# Patient Record
Sex: FEMALE | Race: WHITE | NOT HISPANIC OR LATINO | Employment: FULL TIME | URBAN - METROPOLITAN AREA
[De-identification: names, ages, dates, MRNs, and addresses within clinical notes are randomized per-mention and may not be internally consistent; named-entity substitution may affect disease eponyms.]

---

## 2018-04-12 ENCOUNTER — OFFICE VISIT (OUTPATIENT)
Dept: FAMILY MEDICINE CLINIC | Facility: CLINIC | Age: 58
End: 2018-04-12
Payer: COMMERCIAL

## 2018-04-12 VITALS
RESPIRATION RATE: 16 BRPM | OXYGEN SATURATION: 98 % | SYSTOLIC BLOOD PRESSURE: 126 MMHG | WEIGHT: 247 LBS | HEIGHT: 64 IN | HEART RATE: 74 BPM | BODY MASS INDEX: 42.17 KG/M2 | DIASTOLIC BLOOD PRESSURE: 78 MMHG

## 2018-04-12 DIAGNOSIS — Z23 NEED FOR TETANUS BOOSTER: Primary | ICD-10-CM

## 2018-04-12 DIAGNOSIS — E66.01 CLASS 3 SEVERE OBESITY DUE TO EXCESS CALORIES WITHOUT SERIOUS COMORBIDITY WITH BODY MASS INDEX (BMI) OF 40.0 TO 44.9 IN ADULT (HCC): ICD-10-CM

## 2018-04-12 DIAGNOSIS — I10 ESSENTIAL HYPERTENSION: ICD-10-CM

## 2018-04-12 DIAGNOSIS — R09.82 POST-NASAL DRIP: ICD-10-CM

## 2018-04-12 DIAGNOSIS — R73.03 PREDIABETES: ICD-10-CM

## 2018-04-12 DIAGNOSIS — K74.69 OTHER CIRRHOSIS OF LIVER (HCC): ICD-10-CM

## 2018-04-12 DIAGNOSIS — Z13.1 SCREENING FOR DIABETES MELLITUS (DM): ICD-10-CM

## 2018-04-12 DIAGNOSIS — E78.2 MIXED HYPERLIPIDEMIA: ICD-10-CM

## 2018-04-12 DIAGNOSIS — Z72.0 TOBACCO USE: ICD-10-CM

## 2018-04-12 DIAGNOSIS — Z13.6 SCREENING FOR CARDIOVASCULAR CONDITION: ICD-10-CM

## 2018-04-12 PROBLEM — E66.813 CLASS 3 SEVERE OBESITY DUE TO EXCESS CALORIES WITHOUT SERIOUS COMORBIDITY WITH BODY MASS INDEX (BMI) OF 40.0 TO 44.9 IN ADULT (HCC): Status: ACTIVE | Noted: 2018-04-12

## 2018-04-12 PROCEDURE — 99204 OFFICE O/P NEW MOD 45 MIN: CPT | Performed by: PHYSICIAN ASSISTANT

## 2018-04-12 PROCEDURE — 90471 IMMUNIZATION ADMIN: CPT

## 2018-04-12 PROCEDURE — 90714 TD VACC NO PRESV 7 YRS+ IM: CPT

## 2018-04-12 RX ORDER — ATORVASTATIN CALCIUM 10 MG/1
10 TABLET, FILM COATED ORAL DAILY
Refills: 3 | COMMUNITY
Start: 2018-03-27 | End: 2018-05-18

## 2018-04-12 RX ORDER — ALPRAZOLAM 0.25 MG/1
TABLET ORAL
COMMUNITY
End: 2018-05-18

## 2018-04-12 RX ORDER — LISINOPRIL AND HYDROCHLOROTHIAZIDE 12.5; 1 MG/1; MG/1
1 TABLET ORAL DAILY
Refills: 3 | COMMUNITY
Start: 2018-03-27 | End: 2018-05-18

## 2018-04-12 RX ORDER — FLUTICASONE PROPIONATE 50 MCG
SPRAY, SUSPENSION (ML) NASAL
Refills: 0 | COMMUNITY
Start: 2018-02-09 | End: 2019-06-11

## 2018-04-12 NOTE — ASSESSMENT & PLAN NOTE
- Discussed importance of diet for management of weight  Encouraged both aerobic and resistance exercise 150 minutes a week    - Pt is active with [employer]  - Pt will fill out record release for prior PCP  - CBC, CMP, A1C, TSH, Lipid panel  - Return in 4 weeks to discuss labs and review prior records

## 2018-04-12 NOTE — ASSESSMENT & PLAN NOTE
- Continue Lisinopril-HCTZ QD  - Discussed elevated blood pressure readings and need for management  - Discussed need to go to ED if chest pain, SOB, unremitting headache, vision changes, or decreased urinary output

## 2018-04-12 NOTE — PROGRESS NOTES
Assessment/Plan:    Other cirrhosis of liver (HCC)  Continues to be observed and managed by Hepatologist      Class 3 severe obesity due to excess calories without serious comorbidity with body mass index (BMI) of 40 0 to 44 9 in adult [facility])  - Discussed importance of diet for management of weight  Encouraged both aerobic and resistance exercise 150 minutes a week  - Pt is active with [employer]  - Pt will fill out record release for prior PCP  - CBC, CMP, A1C, TSH, Lipid panel  - Return in 4 weeks to discuss labs and review prior records      Tobacco use  Pt is in the Contemplation stage (ie, Planning on quitting, just not yet)  - Educate on the negative effects of Tobaco   - Recommend quitting smoking  - Listed cessation options   - Will discuss at FU      Need for tetanus booster  - Pt received tetanus booster today    Prediabetes  Per Pt Last A1C of 5 8 two months prior  - Continue metformin  - recheck A1C    Essential hypertension  - Continue Lisinopril-HCTZ QD  - Discussed elevated blood pressure readings and need for management  - Discussed need to go to ED if chest pain, SOB, unremitting headache, vision changes, or decreased urinary output  Mixed hyperlipidemia  - Continue Lipitor  - ordered lipid panel    Post-nasal drip  - Recommended Flonase  - Directed Pt to use OTC allergy medication such as Zyrtec PRN sneezing       Diagnoses and all orders for this visit:    Need for tetanus booster  -     TD VACCINE GREATER THAN OR EQUAL TO 6YO PRESERVATIVE FREE IM    Prediabetes  -     CBC and Platelet; Future  -     Comprehensive metabolic panel; Future  -     Lipid panel; Future  -     TSH, 3rd generation with T4 reflex; Future  -     HEMOGLOBIN A1C W/ EAG ESTIMATION; Future    Class 3 severe obesity due to excess calories without serious comorbidity with body mass index (BMI) of 40 0 to 44 9 in adult (HCC)  -     CBC and Platelet; Future  -     Comprehensive metabolic panel;  Future  -     Lipid panel; Future  -     TSH, 3rd generation with T4 reflex; Future  -     HEMOGLOBIN A1C W/ EAG ESTIMATION; Future    Other cirrhosis of liver (HCC)  -     CBC and Platelet; Future  -     Comprehensive metabolic panel; Future  -     Lipid panel; Future  -     TSH, 3rd generation with T4 reflex; Future  -     HEMOGLOBIN A1C W/ EAG ESTIMATION; Future    Tobacco use  -     CBC and Platelet; Future  -     Comprehensive metabolic panel; Future  -     Lipid panel; Future  -     TSH, 3rd generation with T4 reflex; Future  -     HEMOGLOBIN A1C W/ EAG ESTIMATION; Future    Screening for cardiovascular condition  -     CBC and Platelet; Future  -     Comprehensive metabolic panel; Future  -     Lipid panel; Future  -     TSH, 3rd generation with T4 reflex; Future  -     HEMOGLOBIN A1C W/ EAG ESTIMATION; Future    Screening for diabetes mellitus (DM)  -     CBC and Platelet; Future  -     Comprehensive metabolic panel; Future  -     Lipid panel; Future  -     TSH, 3rd generation with T4 reflex; Future  -     HEMOGLOBIN A1C W/ EAG ESTIMATION; Future    Post-nasal drip    Mixed hyperlipidemia    Essential hypertension    Other orders  -     ALPRAZolam (XANAX) 0 25 mg tablet; Take by mouth daily at bedtime as needed for anxiety          Subjective:      Patient ID: Isabel Schlatter is a 59 y o  female, here for an annual wellness visit  Lingering cough from a cold 2 weeks ago  She may have post nasal drip  She has not been taking any medications the last week  She is in PT for Right achilles tendon  She had surgery at [facility] in 2015 and 2017  She take Metformin only once day  Her last A1C two months prior was 5 8  She does have anxiety several times a year and manages it with Xanax  This is usually surrounding stressful times in her life  None recently  She has been seeing a hepatologist who has been observing her lift function   She was diagnosed with cirrhosis but is uncertain as to the cause       Prior PCP: Dr Montana Anderson, change with insurance  Previous Dental Visit: 3 month prior, awaiting 2 caps  Previous Eye Exam: 1 year prior, no vision problems    Prior Vaccines:   - Last Tetanus vaccine: uncertain, possibly within >10 years ago  Last Colonoscopy: last year, WNL  GYN,     Diet: on weight watcher  Activity: lightly active, no set exercise    Last Pap: Last May, WNL  Last Mammo: Last May, WNL  The following portions of the patient's history were reviewed and updated as appropriate: allergies, current medications, past family history, past medical history, past social history, past surgical history and problem list     Review of Systems   Constitutional: Negative for activity change, chills, fatigue, fever and unexpected weight change  HENT: Negative for rhinorrhea, sinus pressure and sore throat  Eyes: Negative for visual disturbance  Respiratory: Positive for cough and wheezing  Negative for shortness of breath  Cardiovascular: Negative for chest pain, palpitations and leg swelling  Gastrointestinal: Negative for abdominal pain, constipation, diarrhea and nausea  Endocrine: Negative for cold intolerance and heat intolerance  Genitourinary: Negative for difficulty urinating  Musculoskeletal: Negative for arthralgias and myalgias  Skin: Negative for pallor, rash and wound  Allergic/Immunologic: Positive for environmental allergies  Negative for food allergies  Neurological: Negative for dizziness, seizures, speech difficulty, weakness and headaches  Hematological: Negative for adenopathy  Psychiatric/Behavioral: Positive for sleep disturbance  Negative for behavioral problems and dysphoric mood  The patient is nervous/anxious (related to stressors, several times a year)  Social History     Social History    Marital status: /Civil Union     Spouse name: N/A    Number of children: N/A    Years of education: N/A     Occupational History    Not on file  Social History Main Topics    Smoking status: Current Every Day Smoker     Packs/day: 0 50     Years: 40 00    Smokeless tobacco: Never Used    Alcohol use No      Comment: Sober for 20 years    Drug use: No    Sexual activity: No     Other Topics Concern    Not on file     Social History Narrative    Work - [employer]         Objective:  /78 (BP Location: Left arm, Patient Position: Sitting, Cuff Size: Large)   Pulse 74   Resp 16   Ht 5' 4" (1 626 m)   Wt 112 kg (247 lb)   SpO2 98%   BMI 42 40 kg/m²      Physical Exam   Constitutional: She is oriented to person, place, and time  She appears well-developed and well-nourished  No distress  HENT:   Head: Normocephalic and atraumatic  Right Ear: External ear normal    Left Ear: External ear normal    Mouth/Throat: Oropharynx is clear and moist  No oropharyngeal exudate  Eyes: Pupils are equal, round, and reactive to light  Neck: Normal range of motion  Neck supple  No thyromegaly present  Cardiovascular: Normal rate, regular rhythm, normal heart sounds and intact distal pulses  Exam reveals no gallop and no friction rub  No murmur heard  Pulmonary/Chest: Effort normal and breath sounds normal  No respiratory distress  She has no wheezes  She has no rales  Abdominal: Soft  Bowel sounds are normal  She exhibits no distension  There is hepatomegaly  There is no tenderness  There is no rebound and no guarding  Musculoskeletal: Normal range of motion  She exhibits no deformity  Right lower leg: She exhibits edema (trace)  Left lower leg: She exhibits edema (1+)  Lymphadenopathy:     She has no cervical adenopathy  Neurological: She is alert and oriented to person, place, and time  Skin: Skin is warm and dry  No rash noted  She is not diaphoretic  No erythema  Psychiatric: She has a normal mood and affect

## 2018-04-12 NOTE — ASSESSMENT & PLAN NOTE
Pt is in the Contemplation stage (ie, Planning on quitting, just not yet)  - Educate on the negative effects of Tobaco   - Recommend quitting smoking  - Listed cessation options   - Will discuss at FU

## 2018-05-18 DIAGNOSIS — I10 ESSENTIAL HYPERTENSION: ICD-10-CM

## 2018-05-18 DIAGNOSIS — F41.9 ANXIETY: Primary | ICD-10-CM

## 2018-05-18 DIAGNOSIS — E11.9 TYPE 2 DIABETES MELLITUS WITHOUT COMPLICATION, WITHOUT LONG-TERM CURRENT USE OF INSULIN (HCC): ICD-10-CM

## 2018-05-18 DIAGNOSIS — E78.2 MIXED HYPERLIPIDEMIA: ICD-10-CM

## 2018-05-18 RX ORDER — ATORVASTATIN CALCIUM 10 MG/1
10 TABLET, FILM COATED ORAL DAILY
Qty: 90 TABLET | Refills: 0 | Status: SHIPPED | OUTPATIENT
Start: 2018-05-18 | End: 2018-07-12 | Stop reason: SDUPTHER

## 2018-05-18 RX ORDER — LISINOPRIL AND HYDROCHLOROTHIAZIDE 12.5; 1 MG/1; MG/1
1 TABLET ORAL DAILY
Qty: 90 TABLET | Refills: 0 | Status: SHIPPED | OUTPATIENT
Start: 2018-05-18 | End: 2019-07-22 | Stop reason: SDUPTHER

## 2018-05-18 RX ORDER — ALPRAZOLAM 0.25 MG/1
TABLET ORAL
Qty: 30 TABLET | Refills: 0 | OUTPATIENT
Start: 2018-05-18

## 2018-05-18 NOTE — TELEPHONE ENCOUNTER
Refilled Lipitor, Metformin and Lisinopril-HCTZ  She will need to be seen to refill Xanax as it was addressed at the previous appointments  Checked [place] and [place]MP; no red flags identified; safe to proceed with prescriptions     Xanax last filled on 7/31/2017, 90 tablets by Dr Sidney Bey

## 2018-05-21 NOTE — TELEPHONE ENCOUNTER
Pt does have a follow up appt in July, and was just here last month    Did you want her to come in sooner?

## 2018-05-21 NOTE — TELEPHONE ENCOUNTER
It would make more sense if I finished my sentence from the prior note  Her Anxiety and Xanax were not discussed in the prior appointment  It will be covered at her appointment in July but if she needs some Xanax until that point we would need to speak with her

## 2018-05-21 NOTE — TELEPHONE ENCOUNTER
I will advise the patient, however the note from last month does state "She does have anxiety several times a year and manages it with Xanax   This is usually surrounding stressful times in her life "

## 2018-07-02 ENCOUNTER — APPOINTMENT (OUTPATIENT)
Dept: LAB | Facility: CLINIC | Age: 58
End: 2018-07-02
Payer: COMMERCIAL

## 2018-07-02 DIAGNOSIS — K74.69 OTHER CIRRHOSIS OF LIVER (HCC): ICD-10-CM

## 2018-07-02 DIAGNOSIS — E66.01 CLASS 3 SEVERE OBESITY DUE TO EXCESS CALORIES WITHOUT SERIOUS COMORBIDITY WITH BODY MASS INDEX (BMI) OF 40.0 TO 44.9 IN ADULT (HCC): ICD-10-CM

## 2018-07-02 DIAGNOSIS — Z13.1 SCREENING FOR DIABETES MELLITUS (DM): ICD-10-CM

## 2018-07-02 DIAGNOSIS — Z13.6 SCREENING FOR CARDIOVASCULAR CONDITION: ICD-10-CM

## 2018-07-02 DIAGNOSIS — R73.03 PREDIABETES: ICD-10-CM

## 2018-07-02 DIAGNOSIS — Z72.0 TOBACCO USE: ICD-10-CM

## 2018-07-02 LAB
ALBUMIN SERPL BCP-MCNC: 3.8 G/DL (ref 3.5–5)
ALP SERPL-CCNC: 87 U/L (ref 46–116)
ALT SERPL W P-5'-P-CCNC: 28 U/L (ref 12–78)
ANION GAP SERPL CALCULATED.3IONS-SCNC: 7 MMOL/L (ref 4–13)
AST SERPL W P-5'-P-CCNC: 22 U/L (ref 5–45)
BILIRUB SERPL-MCNC: 0.79 MG/DL (ref 0.2–1)
BUN SERPL-MCNC: 13 MG/DL (ref 5–25)
CALCIUM SERPL-MCNC: 9.3 MG/DL (ref 8.3–10.1)
CHLORIDE SERPL-SCNC: 103 MMOL/L (ref 100–108)
CHOLEST SERPL-MCNC: 165 MG/DL (ref 50–200)
CO2 SERPL-SCNC: 27 MMOL/L (ref 21–32)
CREAT SERPL-MCNC: 0.69 MG/DL (ref 0.6–1.3)
ERYTHROCYTE [DISTWIDTH] IN BLOOD BY AUTOMATED COUNT: 12.6 % (ref 11.6–15.1)
EST. AVERAGE GLUCOSE BLD GHB EST-MCNC: 131 MG/DL
GFR SERPL CREATININE-BSD FRML MDRD: 96 ML/MIN/1.73SQ M
GLUCOSE P FAST SERPL-MCNC: 132 MG/DL (ref 65–99)
HBA1C MFR BLD: 6.2 % (ref 4.2–6.3)
HCT VFR BLD AUTO: 46.6 % (ref 34.8–46.1)
HDLC SERPL-MCNC: 50 MG/DL (ref 40–60)
HGB BLD-MCNC: 15.2 G/DL (ref 11.5–15.4)
LDLC SERPL CALC-MCNC: 96 MG/DL (ref 0–100)
MCH RBC QN AUTO: 31.3 PG (ref 26.8–34.3)
MCHC RBC AUTO-ENTMCNC: 32.6 G/DL (ref 31.4–37.4)
MCV RBC AUTO: 96 FL (ref 82–98)
NONHDLC SERPL-MCNC: 115 MG/DL
PLATELET # BLD AUTO: 172 THOUSANDS/UL (ref 149–390)
PMV BLD AUTO: 10.5 FL (ref 8.9–12.7)
POTASSIUM SERPL-SCNC: 4.3 MMOL/L (ref 3.5–5.3)
PROT SERPL-MCNC: 7.4 G/DL (ref 6.4–8.2)
RBC # BLD AUTO: 4.86 MILLION/UL (ref 3.81–5.12)
SODIUM SERPL-SCNC: 137 MMOL/L (ref 136–145)
TRIGL SERPL-MCNC: 93 MG/DL
TSH SERPL DL<=0.05 MIU/L-ACNC: 2.52 UIU/ML (ref 0.36–3.74)
WBC # BLD AUTO: 4.56 THOUSAND/UL (ref 4.31–10.16)

## 2018-07-02 PROCEDURE — 84443 ASSAY THYROID STIM HORMONE: CPT

## 2018-07-02 PROCEDURE — 85027 COMPLETE CBC AUTOMATED: CPT

## 2018-07-02 PROCEDURE — 36415 COLL VENOUS BLD VENIPUNCTURE: CPT

## 2018-07-02 PROCEDURE — 80053 COMPREHEN METABOLIC PANEL: CPT

## 2018-07-02 PROCEDURE — 80061 LIPID PANEL: CPT

## 2018-07-02 PROCEDURE — 83036 HEMOGLOBIN GLYCOSYLATED A1C: CPT

## 2018-07-12 ENCOUNTER — OFFICE VISIT (OUTPATIENT)
Dept: FAMILY MEDICINE CLINIC | Facility: CLINIC | Age: 58
End: 2018-07-12
Payer: COMMERCIAL

## 2018-07-12 VITALS
WEIGHT: 263 LBS | SYSTOLIC BLOOD PRESSURE: 138 MMHG | RESPIRATION RATE: 16 BRPM | HEIGHT: 64 IN | DIASTOLIC BLOOD PRESSURE: 82 MMHG | BODY MASS INDEX: 44.9 KG/M2 | HEART RATE: 83 BPM | OXYGEN SATURATION: 98 %

## 2018-07-12 DIAGNOSIS — R09.82 POST-NASAL DRIP: ICD-10-CM

## 2018-07-12 DIAGNOSIS — Z72.0 TOBACCO USE: ICD-10-CM

## 2018-07-12 DIAGNOSIS — E78.2 MIXED HYPERLIPIDEMIA: ICD-10-CM

## 2018-07-12 DIAGNOSIS — E66.01 CLASS 3 SEVERE OBESITY DUE TO EXCESS CALORIES WITHOUT SERIOUS COMORBIDITY WITH BODY MASS INDEX (BMI) OF 40.0 TO 44.9 IN ADULT (HCC): Primary | ICD-10-CM

## 2018-07-12 DIAGNOSIS — F41.9 ANXIETY: ICD-10-CM

## 2018-07-12 DIAGNOSIS — I10 ESSENTIAL HYPERTENSION: ICD-10-CM

## 2018-07-12 DIAGNOSIS — R73.03 PREDIABETES: ICD-10-CM

## 2018-07-12 PROBLEM — Z23 NEED FOR TETANUS BOOSTER: Status: RESOLVED | Noted: 2018-04-12 | Resolved: 2018-07-12

## 2018-07-12 PROCEDURE — 99214 OFFICE O/P EST MOD 30 MIN: CPT | Performed by: PHYSICIAN ASSISTANT

## 2018-07-12 PROCEDURE — 4004F PT TOBACCO SCREEN RCVD TLK: CPT | Performed by: PHYSICIAN ASSISTANT

## 2018-07-12 RX ORDER — ATORVASTATIN CALCIUM 40 MG/1
40 TABLET, FILM COATED ORAL DAILY
Qty: 90 TABLET | Refills: 0 | Status: CANCELLED | OUTPATIENT
Start: 2018-07-12

## 2018-07-12 RX ORDER — DOXYCYCLINE HYCLATE 100 MG/1
CAPSULE ORAL
COMMUNITY
End: 2018-08-14 | Stop reason: ALTCHOICE

## 2018-07-12 RX ORDER — BUPROPION HYDROCHLORIDE 150 MG/1
150 TABLET, EXTENDED RELEASE ORAL 2 TIMES DAILY
Qty: 60 TABLET | Refills: 0 | Status: SHIPPED | OUTPATIENT
Start: 2018-07-12 | End: 2018-08-14 | Stop reason: SINTOL

## 2018-07-12 RX ORDER — ATORVASTATIN CALCIUM 20 MG/1
20 TABLET, FILM COATED ORAL DAILY
Qty: 30 TABLET | Refills: 0 | Status: SHIPPED | OUTPATIENT
Start: 2018-07-12 | End: 2018-08-13 | Stop reason: SDUPTHER

## 2018-07-12 RX ORDER — ALPRAZOLAM 0.25 MG/1
0.25 TABLET ORAL
Qty: 30 TABLET | Refills: 0 | Status: SHIPPED | OUTPATIENT
Start: 2018-07-12 | End: 2018-08-14 | Stop reason: SDUPTHER

## 2018-07-12 NOTE — ASSESSMENT & PLAN NOTE
ASCVD 10 year risk of 9 1%   "Medium Intensity statin therapy is reasonable for an adult with a >7 5% estimated 10-year risk and LDL of 70 - 189 mg/dl " ACC 2013 Guideline on the Treatment of Blood Cholesterol    - Continue Atorvastatin 20 mg

## 2018-07-12 NOTE — ASSESSMENT & PLAN NOTE
Moderately well controlled  Checked the [place] and [place]MP; no red flags identified; safe to proceed with prescriptions   Last script was 90 0 25 tablets in 7/2017  - Xanax 0 25mg refilled   - Discussed Wellbutrin possibly improving mood  - Pt will return if she needs a refill

## 2018-07-12 NOTE — ASSESSMENT & PLAN NOTE
138/82, Repeat BP of 140/76  Pt wants to wait to increase dosage until she gets more home measurements  - Continue Lisinopril- HCTZ   - Discussed home BP measurements and brining log and machine at FU   Pt will call with logs next week  - Ordered Micro/Creat ratio  - Discussed eye exam  Pt agreed to schedule eye exam

## 2018-07-12 NOTE — PROGRESS NOTES
Assessment/Plan:    Anxiety  Moderately well controlled  Checked [place] and [place]; no red flags identified; safe to proceed with prescriptions  Last script was 90 0 25 tablets in 7/2017  - Xanax 0 25mg refilled (30 tablets)  - Discussed Wellbutrin possibly improving mood  - Pt will return if she needs a refill    Tobacco use  Pt has in the planning stage of quiting  She will set a quit date and discuss with her family  - Discussed impact of quitting smoking  Pt is motivated to quit  - Start on Wellbutrin 150mg BID for 30 days  Will reassess and provide refill for up to 2 more months  - FU in 1 month    Class 3 severe obesity due to excess calories without serious comorbidity with body mass index (BMI) of 40 0 to 44 9 in adult (HCC)  Weight is increased 16 pounds in 3 months  Discussed need for weight loss  Pt agrees  - Discussed restarting Weight watchers  - Referral to Registered Dietitian    Essential hypertension  138/82, Repeat BP of 140/76  Pt wants to wait to increase dosage until she gets more home measurements  - Continue Lisinopril- HCTZ   - Discussed home BP measurements and brining log and machine at FU  Pt will call with logs next week  - Ordered Micro/Creat ratio  - Discussed eye exam  Pt agreed to schedule eye exam    Mixed hyperlipidemia  ASCVD 10 year risk of 9 1%   "Medium Intensity statin therapy is reasonable for an adult with a >7 5% estimated 10-year risk and LDL of 70 - 189 mg/dl " ACC 2013 Guideline on the Treatment of Blood Cholesterol  - Continue Atorvastatin 20 mg    Prediabetes  A1C of 6 2 (7/2018) increased from 5 8 (4/2018)  - Continue Metformin 500mg QD    Other cirrhosis of liver ([facility] [address] )  Managed by Gastroenterologist  Last exam was in January  She is scheduled for US next month    Post-nasal drip  Continues to bother her  - Recommended Flonase    - Records release signed for for [facility] to review Prior Pap smear/Mammogram/Colonoscopy  - FU appt scheduled in 1 month    I have spent 45 minutes with Patient  today in which greater than 50% of this time was spent in counseling/coordination of care regarding Diagnostic results, Risks and benefits of tx options, Patient and family education and Impressions  Discussed case with Dr Sabrina Lobato    Subjective:    Patient ID: Cathy Ross is a 59 y o  female  Pt is presenting today for FU of labs and to discuss Anxiety and smoking cessation  Pt is requesting refill of Xanax 0 25 mg  She uses Xanax during only stressful times (loss of mother-in-law, long trips)  She usually takes one for several days in a row  She has a home BP cuff but does not use it often  She knows how but forgets to write them down when she does  Pt stopped going to weight watchers and has gained weight  She does not feel motivated to lose weight  She has decreased the amount of cigarettes to 5-6 a day from 10  She still wants to quit smoking but has not set a date  Her last colonoscopy, mammogram and pap smear were all performed at [facility]  The colonoscopy was performed last year with normal findings  Mammogram and Pap smear [facility] 2 months prior with normal findings  She denies any side effects from her medications  The following portions of the patient's history were reviewed and updated as appropriate: allergies, current medications, past medical history, past social history and problem list     Review of Systems   Constitutional: Negative for fatigue, fever and unexpected weight change  HENT: Negative for rhinorrhea and sore throat  Respiratory: Negative for cough, shortness of breath and wheezing  Cardiovascular: Negative for chest pain, palpitations and leg swelling  Gastrointestinal: Negative for constipation, diarrhea and nausea  Musculoskeletal: Negative for arthralgias and myalgias           Objective:  /82 (BP Location: Right arm, Patient Position: Sitting, Cuff Size: Standard)   Pulse 83   Resp 16 Ht 5' 4" (1 626 m)   Wt 119 kg (263 lb)   SpO2 98%   BMI 45 14 kg/m²      Physical Exam   Constitutional: She appears well-developed and well-nourished  No distress  HENT:   Head: Normocephalic and atraumatic  Eyes: Pupils are equal, round, and reactive to light  Neck: Normal range of motion  Neck supple  Cardiovascular: Normal rate, regular rhythm, normal heart sounds and intact distal pulses  Exam reveals no gallop and no friction rub  No murmur heard  Pulmonary/Chest: Effort normal and breath sounds normal  No respiratory distress  She has no wheezes  She has no rales  Lymphadenopathy:     She has no cervical adenopathy  Skin: She is not diaphoretic

## 2018-07-12 NOTE — ASSESSMENT & PLAN NOTE
Pt has in the planning stage   She will set a quit date and discuss with her family   - Start on Wellbutrin 150mg BID for 30 days  - FU in 1 month

## 2018-07-12 NOTE — PROGRESS NOTES
Assessment/Plan:    Mixed hyperlipidemia  ASCVD 10 year risk of 14 2%  "High Intensity statin therapy is reasonable for an adult with diabetes mellitus with a >7 5% estimated  10-year risk " ACC 2013 Guideline on the Treatment of Blood Cholesterol    - Increase dose of Atorvastatin to 40 mg, slowly titrating up  - Repeat lipid in 1 month      Prediabetes  A1C of 6 2 (7/2018) increased from 5 8 (4/2018)  - Continue Metformin    Essential hypertension    Repeat BP of   - Continue Lisinopril- HCTZ   - Discussed home BP measurements and brining log and machine at FU   - Ordered Micro/Creat ratio       Diagnoses and all orders for this visit:    Class 3 severe obesity due to excess calories without serious comorbidity with body mass index (BMI) of 40 0 to 44 9 in [facility])  -     Ambulatory referral to Nutrition Services; Future    Essential hypertension  -     Microalbumin / creatinine urine ratio    Mixed hyperlipidemia  -     atorvastatin (LIPITOR) 40 mg tablet; Take 1 tablet (40 mg total) by mouth daily  -     Lipid panel; Future    Prediabetes    Tobacco use    Other orders  -     doxycycline hyclate (VIBRAMYCIN) 100 mg capsule; doxycycline hyclate 100 mg capsule        Discussed case with Dr Lamb:    Patient ID: Trice Zamudio is a 59 y o  female  Pt is presenting today for     She uses Xanax during stressful times (loss of mother-in-law, long trips)  She usually takes one for several day    Pt stopped going to weight watchers      Last colonoscopy, performed at [facility] and Pap smear [facility] 2 months prior      The following portions of the patient's history were reviewed and updated as appropriate: allergies, current medications, past medical history, past social history and problem list     Review of Systems      Objective:  /82 (BP Location: Right arm, Patient Position: Sitting, Cuff Size: Standard)   Pulse 83   Resp 16   Ht 5' 4" (1 626 m)   Wt 119 kg (263 lb)   SpO2 98% BMI 45 14 kg/m²      Physical Exam

## 2018-08-13 DIAGNOSIS — E78.2 MIXED HYPERLIPIDEMIA: ICD-10-CM

## 2018-08-13 RX ORDER — ATORVASTATIN CALCIUM 20 MG/1
TABLET, FILM COATED ORAL
Qty: 90 TABLET | Refills: 1 | Status: SHIPPED | OUTPATIENT
Start: 2018-08-13 | End: 2018-08-14 | Stop reason: SDUPTHER

## 2018-08-14 ENCOUNTER — OFFICE VISIT (OUTPATIENT)
Dept: FAMILY MEDICINE CLINIC | Facility: CLINIC | Age: 58
End: 2018-08-14
Payer: COMMERCIAL

## 2018-08-14 VITALS
RESPIRATION RATE: 18 BRPM | HEIGHT: 64 IN | BODY MASS INDEX: 45.24 KG/M2 | OXYGEN SATURATION: 98 % | DIASTOLIC BLOOD PRESSURE: 72 MMHG | WEIGHT: 265 LBS | SYSTOLIC BLOOD PRESSURE: 110 MMHG | HEART RATE: 83 BPM

## 2018-08-14 DIAGNOSIS — Z72.0 TOBACCO USE: ICD-10-CM

## 2018-08-14 DIAGNOSIS — I10 ESSENTIAL HYPERTENSION: ICD-10-CM

## 2018-08-14 DIAGNOSIS — E66.01 CLASS 3 SEVERE OBESITY DUE TO EXCESS CALORIES WITHOUT SERIOUS COMORBIDITY WITH BODY MASS INDEX (BMI) OF 40.0 TO 44.9 IN ADULT (HCC): ICD-10-CM

## 2018-08-14 DIAGNOSIS — R73.03 PREDIABETES: ICD-10-CM

## 2018-08-14 DIAGNOSIS — F41.9 ANXIETY: ICD-10-CM

## 2018-08-14 DIAGNOSIS — M54.32 SCIATICA OF LEFT SIDE: ICD-10-CM

## 2018-08-14 DIAGNOSIS — E78.2 MIXED HYPERLIPIDEMIA: Primary | ICD-10-CM

## 2018-08-14 PROCEDURE — 3078F DIAST BP <80 MM HG: CPT | Performed by: PHYSICIAN ASSISTANT

## 2018-08-14 PROCEDURE — 99214 OFFICE O/P EST MOD 30 MIN: CPT | Performed by: PHYSICIAN ASSISTANT

## 2018-08-14 PROCEDURE — 3008F BODY MASS INDEX DOCD: CPT | Performed by: PHYSICIAN ASSISTANT

## 2018-08-14 PROCEDURE — 3074F SYST BP LT 130 MM HG: CPT | Performed by: PHYSICIAN ASSISTANT

## 2018-08-14 RX ORDER — ATORVASTATIN CALCIUM 20 MG/1
20 TABLET, FILM COATED ORAL DAILY
Qty: 90 TABLET | Refills: 1 | Status: SHIPPED | OUTPATIENT
Start: 2018-08-14 | End: 2020-02-10

## 2018-08-14 RX ORDER — ALPRAZOLAM 0.25 MG/1
0.25 TABLET ORAL
Qty: 30 TABLET | Refills: 0 | Status: SHIPPED | OUTPATIENT
Start: 2018-08-14 | End: 2019-02-06 | Stop reason: SDUPTHER

## 2018-08-14 NOTE — ASSESSMENT & PLAN NOTE
Well Managed  1st 110/72, 2nd 114/76   Pt at home BP measurements are <130/80   - Continue Lisinopril HCTZ  - Pt as yet to schedule eye exam

## 2018-08-14 NOTE — ASSESSMENT & PLAN NOTE
Patient has set quit date of Labor day  Pt declined nicotine replacement therapy    - Will FU if any assistance needed

## 2018-08-14 NOTE — ASSESSMENT & PLAN NOTE
Well managed on Xanax 0 25mg  Checked [place] and [place]; no red flags identified; safe to proceed with prescriptions

## 2018-08-14 NOTE — PROGRESS NOTES
Assessment/Plan:    Sciatica of left side  Intermittent problem  Chronic issue  Pt has seeing Chiropractor for management  - Pt will FU if symptoms persist     Class 3 severe obesity due to excess calories without serious comorbidity with body mass index (BMI) of 40 0 to 44 9 in adult (HCC)  Weight is up 2 pounds in 1 month  Pt has appointment setup for Dietitian next month  - FU in 3 months    Essential hypertension  Well Managed  1st 110/72, 2nd 114/76  Pt at home BP measurements are <130/80   - Continue Lisinopril HCTZ  - Pt as yet to schedule eye exam     Anxiety  Well managed on Xanax 0 25mg  [place]; no red flags identified; safe to proceed with prescriptions  Prediabetes  Repeat A1C in 3 months  - Continue Metformin    Tobacco use  Patient has set quit date of Labor day  Pt declined nicotine replacement therapy  - Will FU if any assistance needed    Mixed hyperlipidemia  ASCVD 10 year risk of 5 2% down from 9 1% last month  Once she quits smoking it should lower it to 2 5%  - Continue Lipitor 20mg    FU appointment scheduled for 3 months  Discussed case with Dr Danielle Dial    Subjective:    Patient ID: Mathew Camacho is a 59 y o  female  Pt is presenting today for Chronic condition follow up  She was on Wellbutrin for 10 days and she stopped she was having dizzy episodes  She is still smoking and moved to 4 cigarettes a day  She has set labor day as her quit date  Her family is supportive and she is planning on quitting by continuing her gradual reduction  Her home BP has been with the average 128/85  She denies any light headedness  She has been taking her medications without any side effects  She has an appointment with the RD on Sept 19th to discuss healthy eating options  She does not have a set goal for her weight  She has yearly US of her liver to check for changes with hepatomegaly  Her last was performed last month with no new changes      Her last colonoscopy was last year at [facility]  Records request already submitted  Last Pap: May 2017, [facility]  Last Mammogram: May 2018, [facility]    The following portions of the patient's history were reviewed and updated as appropriate: allergies, current medications, past medical history, past surgical history and problem list     Review of Systems   Constitutional: Negative for activity change, chills, fatigue, fever and unexpected weight change  HENT: Negative for rhinorrhea, sinus pressure and sore throat  Eyes: Negative for visual disturbance  Respiratory: Negative for cough, shortness of breath and wheezing  Cardiovascular: Negative for chest pain, palpitations and leg swelling  Gastrointestinal: Negative for abdominal pain, constipation, diarrhea and nausea  Endocrine: Negative for cold intolerance and heat intolerance  Genitourinary: Negative for difficulty urinating  Musculoskeletal: Positive for back pain (left sided, baseline)  Negative for arthralgias and myalgias  Skin: Negative for pallor, rash and wound  Allergic/Immunologic: Negative for environmental allergies and food allergies  Neurological: Negative for dizziness, seizures, speech difficulty, weakness and headaches  Hematological: Negative for adenopathy  Psychiatric/Behavioral: Negative for behavioral problems, dysphoric mood and sleep disturbance  The patient is not nervous/anxious  Objective:  /72   Pulse 83   Resp 18   Ht 5' 4" (1 626 m)   Wt 120 kg (265 lb)   SpO2 98%   BMI 45 49 kg/m²      Physical Exam   Constitutional: She is oriented to person, place, and time  She appears well-developed and well-nourished  No distress  HENT:   Head: Normocephalic and atraumatic  Right Ear: External ear normal    Left Ear: External ear normal    Mouth/Throat: Oropharynx is clear and moist  No oropharyngeal exudate  Eyes: Pupils are equal, round, and reactive to light  Neck: Normal range of motion   Neck supple  No thyromegaly present  Cardiovascular: Normal rate, regular rhythm, normal heart sounds and intact distal pulses  Exam reveals no gallop and no friction rub  No murmur heard  Pulmonary/Chest: Effort normal and breath sounds normal  No respiratory distress  She has no wheezes  She has no rales  Abdominal: Soft  Bowel sounds are normal  She exhibits no distension  There is hepatomegaly  There is no tenderness  There is no rebound and no guarding  obese     Musculoskeletal: Normal range of motion  She exhibits no edema or deformity  Lymphadenopathy:     She has no cervical adenopathy  Neurological: She is alert and oriented to person, place, and time  Skin: Skin is warm and dry  No rash noted  She is not diaphoretic  No erythema  Psychiatric: She has a normal mood and affect   Her behavior is normal  Thought content normal

## 2018-08-14 NOTE — ASSESSMENT & PLAN NOTE
ASCVD 10 year risk of 5 2% down from 9 1% last month   Once she quits smoking it should lower it to 2 5%  - Continue Lipitor 20mg

## 2018-08-14 NOTE — ASSESSMENT & PLAN NOTE
Weight is up 2 pounds in 1 month    Pt has appointment setup for Dietitian next month  - FU in 3 months

## 2018-09-19 ENCOUNTER — CLINICAL SUPPORT (OUTPATIENT)
Dept: NUTRITION | Facility: HOSPITAL | Age: 58
End: 2018-09-19
Payer: COMMERCIAL

## 2018-09-19 VITALS — BODY MASS INDEX: 45.65 KG/M2 | WEIGHT: 267.4 LBS | HEIGHT: 64 IN

## 2018-09-19 DIAGNOSIS — E66.01 CLASS 3 SEVERE OBESITY DUE TO EXCESS CALORIES WITHOUT SERIOUS COMORBIDITY WITH BODY MASS INDEX (BMI) OF 40.0 TO 44.9 IN ADULT (HCC): ICD-10-CM

## 2018-09-19 PROCEDURE — 97802 MEDICAL NUTRITION INDIV IN: CPT

## 2018-09-19 NOTE — PROGRESS NOTES
Initial Nutrition Assessment Form    Patient Name: Veronica Gottlieb    YOB: 1960    Sex: Female     Assessment Date: 9/19/2018  Start Time: 46 Stop Time: 1010 Total Minutes: 61     Data:  Present at session: self   Parent Concerns: n/a   Medical Dx/Reason for Referral: obesity   No past medical history on file  HTN hypercholesterolemia prediabetes   Current Outpatient Prescriptions   Medication Sig Dispense Refill    ALPRAZolam (XANAX) 0 25 mg tablet Take 1 tablet (0 25 mg total) by mouth daily at bedtime as needed for anxiety for up to 30 doses 30 tablet 0    atorvastatin (LIPITOR) 20 mg tablet Take 1 tablet (20 mg total) by mouth daily 90 tablet 1    fluticasone (FLONASE) 50 mcg/act nasal spray USE ONE SPRAY IN EACH NOSTRIL TWO TIMES A DAY  0    lisinopril-hydrochlorothiazide (PRINZIDE,ZESTORETIC) 10-12 5 MG per tablet Take 1 tablet by mouth daily 90 tablet 0    metFORMIN (GLUCOPHAGE) 500 mg tablet Take 1 tablet (500 mg total) by mouth 2 (two) times a day with meals 180 tablet 1     No current facility-administered medications for this visit  Additional Meds/Supplements: n/a   Special Learning Needs:    Height:   HC Readings from Last 3 Encounters:   No data found for [facility]       Weight: Wt Readings from Last 12 Encounters:   09/19/18 121 kg (267 lb 6 4 oz)   08/14/18 120 kg (265 lb)   07/12/18 119 kg (263 lb)   04/12/18 112 kg (247 lb)     Estimated body mass index is 45 9 kg/m² as calculated from the following:    Height as of this encounter: 5' 4" (1 626 m)  Weight as of this encounter: 121 kg (267 lb 6 4 oz)    Usual Weight: #  Ideal Body Weight: #   Recent Weight Change: []Yes     [x]No  Amount:       Energy Needs: No calculation needed   Allergies   Allergen Reactions    Penicillins     mushrooms   History   Alcohol Use No     Comment: Sober for 20 years    n/a   History   Smoking Status    Current Every Day Smoker    Packs/day: 0 50    Years: 40 00   Smokeless Tobacco    Never Used    2 cigs/day in process of quitting   Who shops? patient   Who cooks? patient   Exercise: walk dog around yard only n/a   Prior Counseling? []Yes     [x]No  When:    Why:         Diet Hx: water  Breakfast:  1x/wk out to eat coffee x1pkt; pork roll egg cheese 2x/wk; always coffee 30 oz   8   a m  2-3x/wk   Lunch:  Soup (cream of eliot, us  Broth soup) salad (sometmes) 1/2 s/w (chix pesto wrap, ham/swiss) fruit (sometimes)  12   p m   water         Dinner:  Mac hamburger tomato soup 2 c; 4 oz chix 1 c rice and veggies 1 c  Eliot/caul/br sprouts/snow peas/corn/aspargus   530-630   p m  club soda; iced tea-sweet or unsweet         Snacks:  Sweets or salty/crunchy  At work AM -   PM - 1-2x/wk   HS - n/a        Nutrition Diagnosis:   Overweight/obesity  related to Excess energy intake as evidenced by  Overconsumption of high-fat and/or calorie dense food or beverage       Medical Nutrition Therapy Intervention:  [x]Individualized Meal Plan []Understanding Lab Values   []Basic Pathophysiology of Disease []Food/Medication Interactions   [x]Food Diary [x]Exercise   [x]Lifestyle/Behavior Modification Techniques []Medication, Mechanism of Action   []Label Reading []Self Blood Glucose Monitoring   [x]Weight/BMI Goals []Other -    Other Notes:bed 830-9;sleep at 10;awake 5-530        Comprehension: []Excellent  [x]Very Good  []Good  []Fair   []Poor    Receptivity: []Excellent  [x]Very Good  []Good  []Fair   []Poor    Expected Compliance: []Excellent  [x]Very Good  []Good  []Fair   []Poor        Goals:  1   3 meals/day no skipping   2  Watch portion sizes of meals and minimize snacks   3  Activity 3x/wk 30 mins goal       No Follow-up on file    Labs:  CMP  Lab Results   Component Value Date     07/02/2018    K 4 3 07/02/2018     07/02/2018    CO2 27 07/02/2018    ANIONGAP 14 (H) 05/08/2014    BUN 13 07/02/2018    CREATININE 0 69 07/02/2018    GLUCOSE 102 05/08/2014    GLUF 132 (H) 07/02/2018    CALCIUM 9 3 07/02/2018    AST 22 07/02/2018    ALT 28 07/02/2018    ALKPHOS 87 07/02/2018    PROT 7 2 05/08/2014    BILITOT 0 7 05/08/2014    EGFR 96 07/02/2018       BMP  Lab Results   Component Value Date    GLUCOSE 102 05/08/2014    CALCIUM 9 3 07/02/2018     07/02/2018    K 4 3 07/02/2018    CO2 27 07/02/2018     07/02/2018    BUN 13 07/02/2018    CREATININE 0 69 07/02/2018       Lipids  No results found for: CHOL  Lab Results   Component Value Date    HDL 50 07/02/2018     Lab Results   Component Value Date    LDLCALC 96 07/02/2018     Lab Results   Component Value Date    TRIG 93 07/02/2018     No components found for: CHOLHDL    Hemoglobin A1C  Lab Results   Component Value Date    HGBA1C 6 2 07/02/2018       Fasting Glucose  Lab Results   Component Value Date    GLUF 132 (H) 07/02/2018       Insulin     Thyroid  No results found for: TSH, Z8RHWGM, P9VSINF, THYROIDAB    Hepatic Function Panel  Lab Results   Component Value Date    ALT 28 07/02/2018    AST 22 07/02/2018    ALKPHOS 87 07/02/2018    BILITOT 0 7 05/08/2014       Celiac Disease Antibody Panel  No results found for: ENDOMYSIAL IGA, GLIADIN IGA, GLIADIN IGG, IGA, TISSUE TRANSGLUT AB, TTG IGA   Iron  No results found for: IRON, TIBC, FERRITIN    Vitamins  No results found for: VITAMIN B2   No results found for: NICOTINAMIDE, NICOTINIC ACID   No results found for: VITAMINB6  No results found for: [id number]  No results found for: VITB5  No results found for: P6YEKFFV  No results found for: THYROGLB  No results found for: VITAMIN K   No results found for: 25-HYDROXY VIT D   No results found for: VITAMINE       DSalmon MS R DLDN  [place] [id number]  [id number] [facility]

## 2018-10-19 ENCOUNTER — CLINICAL SUPPORT (OUTPATIENT)
Dept: NUTRITION | Facility: HOSPITAL | Age: 58
End: 2018-10-19
Payer: COMMERCIAL

## 2018-10-19 VITALS — WEIGHT: 260.8 LBS | BODY MASS INDEX: 44.77 KG/M2

## 2018-10-19 DIAGNOSIS — E66.01 MORBID OBESITY (HCC): Primary | ICD-10-CM

## 2018-10-19 PROCEDURE — 97803 MED NUTRITION INDIV SUBSEQ: CPT

## 2018-10-19 NOTE — PROGRESS NOTES
Follow-Up Nutrition Assessment Form    Patient Name: Emerson Whatley    YOB: 1960    Sex: Female      Follow Up Date: 10/19/2018  Start Time: 56 Stop Time: 65 Total Minutes: 27     Data:  Present at session: self   Parent Concerns: n/a   Medical Dx/Reason for Referral: wt   No past medical history on file  Current Outpatient Prescriptions   Medication Sig Dispense Refill    ALPRAZolam (XANAX) 0 25 mg tablet Take 1 tablet (0 25 mg total) by mouth daily at bedtime as needed for anxiety for up to 30 doses 30 tablet 0    atorvastatin (LIPITOR) 20 mg tablet Take 1 tablet (20 mg total) by mouth daily 90 tablet 1    fluticasone (FLONASE) 50 mcg/act nasal spray USE ONE SPRAY IN EACH NOSTRIL TWO TIMES A DAY  0    lisinopril-hydrochlorothiazide (PRINZIDE,ZESTORETIC) 10-12 5 MG per tablet Take 1 tablet by mouth daily 90 tablet 0    metFORMIN (GLUCOPHAGE) 500 mg tablet Take 1 tablet (500 mg total) by mouth 2 (two) times a day with meals 180 tablet 1     No current facility-administered medications for this visit  pepsid   Additional Meds/Supplements: none new   Barriers to Learning: None   Labs: None new   Height:   Ht Readings from Last 3 Encounters:   09/19/18 5' 4" (1 626 m)   08/14/18 5' 4" (1 626 m)   07/12/18 5' 4" (1 626 m)      Weight: Wt Readings from Last 12 Encounters:   10/19/18 118 kg (260 lb 12 8 oz)   09/19/18 121 kg (267 lb 6 4 oz)   08/14/18 120 kg (265 lb)   07/12/18 119 kg (263 lb)   04/12/18 112 kg (247 lb)     Estimated body mass index is 44 77 kg/m² as calculated from the following:    Height as of 9/19/18: 5' 4" (1 626 m)  Weight as of this encounter: 118 kg (260 lb 12 8 oz)  Wt  Change Since Last Visit: [x]Yes     []No  Amount: Dec 7#      Energy Needs: No calculation needed   Pain Screen: Are you having pain now?  No      Goals Achieved:  B 80 rufino greek yogurt and fruit drinking coffee 16oz coffee x2 splenda 8am; L cup of soup or 1/2c soup (if creamy) salad 3-4x/wk; hoagie 1/2 whole wheat s/w 3x/wk water 12n; dinner 530-6; chix sausage, pasta, miranda, water or club soda; 1 twizzler; or fruit pops 30 cals for 1 serving; bed at 830-9; reads until 10-11 awake at 5-530       New Goals:   1  Eat within 1 hour waking    2  Afternoon snack   3  Sleep 10p to 5-530 am       Initial PES:       New PES: No Change      New Problem List:  1  None new   2    3        Assessment:   Pleased with wt loss; eating better has changed B around; needs to eat more earlier in the day to decrease portions at night  Also might need more sleep; TReadmill at home now plans to do every day for about 20 mins       Medical Nutrition Therapy Intervention:  [x]Individualized Meal Plan []Understanding Lab Values   []Basic Pathophysiology of Disease []Food/Medication Interactions   [x]Food Diary [x]Exercise   [x]Lifestyle/Behavior Modification Techniques []Medication, Mechanism of Action   []Label Reading []Self Blood Glucose Monitoring   [x]Weight/BMI Goals []Other -    Other Notes:        Comprehension: []Excellent  [x]Very Good  []Good  []Fair   []Poor    Receptivity: []Excellent  [x]Very Good  []Good  []Fair   []Poor    Expected Compliance: []Excellent  [x]Very Good  []Good  []Fair   []Poor      Labs:  CMP  Lab Results   Component Value Date     07/02/2018    K 4 3 07/02/2018     07/02/2018    CO2 27 07/02/2018    ANIONGAP 14 (H) 05/08/2014    BUN 13 07/02/2018    CREATININE 0 69 07/02/2018    GLUCOSE 102 05/08/2014    GLUF 132 (H) 07/02/2018    CALCIUM 9 3 07/02/2018    AST 22 07/02/2018    ALT 28 07/02/2018    ALKPHOS 87 07/02/2018    PROT 7 2 05/08/2014    BILITOT 0 7 05/08/2014    EGFR 96 07/02/2018       BMP  Lab Results   Component Value Date    GLUCOSE 102 05/08/2014    CALCIUM 9 3 07/02/2018     07/02/2018    K 4 3 07/02/2018    CO2 27 07/02/2018     07/02/2018    BUN 13 07/02/2018    CREATININE 0 69 07/02/2018       Lipids  No results found for: CHOL  Lab Results   Component Value Date    HDL 50 07/02/2018     Lab Results   Component Value Date    LDLCALC 96 07/02/2018     Lab Results   Component Value Date    TRIG 93 07/02/2018     No components found for: CHOLHDL    Hemoglobin A1C  Lab Results   Component Value Date    HGBA1C 6 2 07/02/2018       Fasting Glucose  Lab Results   Component Value Date    GLUF 132 (H) 07/02/2018       Insulin     Thyroid  No results found for: TSH, H1AJDJV, F1AFJPI, THYROIDAB    Hepatic Function Panel  Lab Results   Component Value Date    ALT 28 07/02/2018    AST 22 07/02/2018    ALKPHOS 87 07/02/2018    BILITOT 0 7 05/08/2014       Celiac Disease Antibody Panel  No results found for: ENDOMYSIAL IGA, GLIADIN IGA, GLIADIN IGG, IGA, TISSUE TRANSGLUT AB, TTG IGA   Iron  No results found for: IRON, TIBC, FERRITIN    Vitamins  No results found for: VITAMIN B2   No results found for: NICOTINAMIDE, NICOTINIC ACID   No results found for: VITAMINB6  No results found for: [id number]  No results found for: VITB5  No results found for: L5WIRKVZ  No results found for: THYROGLB  No results found for: VITAMIN K   No results found for: 25-HYDROXY VIT D   No results found for: VITAMINE     No Follow-up on [facility] [id number]

## 2018-11-13 ENCOUNTER — APPOINTMENT (OUTPATIENT)
Dept: LAB | Facility: CLINIC | Age: 58
End: 2018-11-13
Payer: COMMERCIAL

## 2018-11-13 DIAGNOSIS — E66.01 CLASS 3 SEVERE OBESITY DUE TO EXCESS CALORIES WITHOUT SERIOUS COMORBIDITY WITH BODY MASS INDEX (BMI) OF 40.0 TO 44.9 IN ADULT (HCC): ICD-10-CM

## 2018-11-13 DIAGNOSIS — R73.03 PREDIABETES: ICD-10-CM

## 2018-11-13 DIAGNOSIS — E78.2 MIXED HYPERLIPIDEMIA: ICD-10-CM

## 2018-11-13 LAB
CHOLEST SERPL-MCNC: 150 MG/DL (ref 50–200)
CREAT UR-MCNC: 143 MG/DL
EST. AVERAGE GLUCOSE BLD GHB EST-MCNC: 131 MG/DL
HBA1C MFR BLD: 6.2 % (ref 4.2–6.3)
HDLC SERPL-MCNC: 52 MG/DL (ref 40–60)
LDLC SERPL CALC-MCNC: 77 MG/DL (ref 0–100)
MICROALBUMIN UR-MCNC: 6.6 MG/L (ref 0–20)
MICROALBUMIN/CREAT 24H UR: 5 MG/G CREATININE (ref 0–30)
NONHDLC SERPL-MCNC: 98 MG/DL
TRIGL SERPL-MCNC: 105 MG/DL

## 2018-11-13 PROCEDURE — 36415 COLL VENOUS BLD VENIPUNCTURE: CPT

## 2018-11-13 PROCEDURE — 80061 LIPID PANEL: CPT

## 2018-11-13 PROCEDURE — 3061F NEG MICROALBUMINURIA REV: CPT | Performed by: PHYSICIAN ASSISTANT

## 2018-11-13 PROCEDURE — 83036 HEMOGLOBIN GLYCOSYLATED A1C: CPT

## 2018-11-13 PROCEDURE — 82570 ASSAY OF URINE CREATININE: CPT | Performed by: PHYSICIAN ASSISTANT

## 2018-11-13 PROCEDURE — 82043 UR ALBUMIN QUANTITATIVE: CPT | Performed by: PHYSICIAN ASSISTANT

## 2018-11-20 ENCOUNTER — OFFICE VISIT (OUTPATIENT)
Dept: FAMILY MEDICINE CLINIC | Facility: CLINIC | Age: 58
End: 2018-11-20
Payer: COMMERCIAL

## 2018-11-20 VITALS
SYSTOLIC BLOOD PRESSURE: 130 MMHG | HEART RATE: 80 BPM | RESPIRATION RATE: 16 BRPM | OXYGEN SATURATION: 98 % | DIASTOLIC BLOOD PRESSURE: 80 MMHG | HEIGHT: 64 IN | BODY MASS INDEX: 44.9 KG/M2 | WEIGHT: 263 LBS

## 2018-11-20 DIAGNOSIS — I10 ESSENTIAL HYPERTENSION: ICD-10-CM

## 2018-11-20 DIAGNOSIS — R73.03 PREDIABETES: ICD-10-CM

## 2018-11-20 DIAGNOSIS — Z23 NEED FOR INFLUENZA VACCINATION: Primary | ICD-10-CM

## 2018-11-20 DIAGNOSIS — F41.9 ANXIETY: ICD-10-CM

## 2018-11-20 DIAGNOSIS — Z72.0 TOBACCO USE: ICD-10-CM

## 2018-11-20 DIAGNOSIS — E66.01 CLASS 3 SEVERE OBESITY DUE TO EXCESS CALORIES WITHOUT SERIOUS COMORBIDITY WITH BODY MASS INDEX (BMI) OF 40.0 TO 44.9 IN ADULT (HCC): ICD-10-CM

## 2018-11-20 PROBLEM — R09.82 POST-NASAL DRIP: Status: RESOLVED | Noted: 2018-04-12 | Resolved: 2018-11-20

## 2018-11-20 PROCEDURE — 90682 RIV4 VACC RECOMBINANT DNA IM: CPT

## 2018-11-20 PROCEDURE — 90471 IMMUNIZATION ADMIN: CPT

## 2018-11-20 PROCEDURE — 3079F DIAST BP 80-89 MM HG: CPT | Performed by: PHYSICIAN ASSISTANT

## 2018-11-20 PROCEDURE — 99214 OFFICE O/P EST MOD 30 MIN: CPT | Performed by: PHYSICIAN ASSISTANT

## 2018-11-20 PROCEDURE — 3008F BODY MASS INDEX DOCD: CPT | Performed by: PHYSICIAN ASSISTANT

## 2018-11-20 PROCEDURE — 3075F SYST BP GE 130 - 139MM HG: CPT | Performed by: PHYSICIAN ASSISTANT

## 2018-11-20 RX ORDER — FAMOTIDINE 40 MG/1
TABLET, FILM COATED ORAL
COMMUNITY
Start: 2018-11-05 | End: 2019-05-07

## 2018-11-20 NOTE — PROGRESS NOTES
Assessment/Plan:    Tobacco use  Moved to 2 cigarettes a day  She is still planning on quitting hopefully by the new year  Discussed likely that it is more behavioral issue and not a nicotine addiction   - Patient will attempt chewing gum during the 2 times a day she smokes a half cigarettes  - Drected the patient to contact us if she would like assistance of any degree  Class 3 severe obesity due to excess calories without serious comorbidity with body mass index (BMI) of 40 0 to 44 9 in adult [facility])  Patient is actively engaged in intensive dietary and exercise changes  - directed continued follow-up with dietician   - Follow-up in 3-6 months  Prediabetes  Most recent A1c of 6 2   - Continue on metformin 500 mg twice a day  - diabetic foot exam performed in office today with normal results  - repeat A1c in 3 months  Anxiety  Current well managed on Xanax 0 25 mg p r n  No refill needed at this time    Essential hypertension  Will managed  Initial blood pressure of 130/80, 2nd blood pressure of 130/86   - continue lisinopril hydrochlorothiazide   - Requested bring BP log at FU visit  Patient received flu vaccine in office today  Patient is setup to get an eye examine  Requested patient records sent to office  Per patient, last colonoscopy was performed 2 years ago with normal results  Follow-up appointment scheduled for 6 months  Patient's shoes and socks removed  Right Foot/Ankle   Right Foot Inspection  Skin Exam: skin normal skin not intact, no dry skin, no warmth, no callus, no erythema, no maceration, no abnormal color, no pre-ulcer, no ulcer and no callus                          Toe Exam: ROM and strength within normal limits  Sensory   Vibration: intact  Proprioception: intact   Monofilament testing: intact  Vascular  Capillary refills: < 3 seconds  The right DP pulse is 2+       Left Foot/Ankle  Left Foot Inspection  Skin Exam: skin normalskin not intact, no dry skin, no warmth, no erythema, no maceration, normal color, no pre-ulcer, no ulcer and no callus                         Toe Exam: ROM and strength within normal limits                   Sensory   Vibration: intact  Proprioception: intact  Monofilament: intact  Vascular  Capillary refills: < 3 seconds  The left DP pulse is 2+  Assign Risk Category:  No deformity present; No loss of protective sensation; No weak pulses       Risk: 0      Subjective:    Patient ID: Stephanie Yeh is a 59 y o  female  Pt is presenting today for Chronic condition follow up  She is concerned that she is developing a sinus infection  She denies any fever, chills, headache  She notes slight pressure in her left frontal sinus  Saw nutritionist and she was started on a 1200 calorie diet  She notes she lost 7 pounds over the past 1 month  In addition she purchased a treadmill and has been exercising on 40 minutes a day, every day  She has been taking her lisinopril hydrochlorothiazide for her hypertension without any side effects, chest pain, palpitations or shortness of breath  Anxiety is well controlled  She has only used Xanax a few times over the past month  She has been unable to completely quit smoking  She states she does smoke 2 half cigarettes a day  Discussed that this is likely a chemical addiction more habit based on the certain times  She denies that it is related to any issues with anxiety  She had an EGD performed done at [address] [place] [address] with normal results  Initial was old or sent to the office  She is awaiting final biopsy result  The following portions of the patient's history were reviewed and updated as appropriate: allergies, current medications, past social history and problem list     Review of Systems   Respiratory: Negative  Cardiovascular: Negative  Musculoskeletal: Negative            Objective:  /80   Pulse 80   Resp 16   Ht 5' 4" (1 626 m)   Wt 119 kg (263 lb) SpO2 98%   BMI 45 14 kg/m²      Physical Exam   Constitutional: She is oriented to person, place, and time  She appears well-developed and well-nourished  No distress  HENT:   Head: Normocephalic and atraumatic  Eyes: Pupils are equal, round, and reactive to light  Neck: Normal range of motion  Neck supple  Cardiovascular: Normal rate, regular rhythm, normal heart sounds and intact distal pulses  Exam reveals no gallop and no friction rub  Pulses are no weak pulses  No murmur heard  Pulses:       Dorsalis pedis pulses are 2+ on the right side, and 2+ on the left side  Pulmonary/Chest: Effort normal and breath sounds normal  No respiratory distress  She has no wheezes  She has no rales  Abdominal:   Obese   Feet:   Right Foot:   Skin Integrity: Negative for ulcer, skin breakdown, erythema, warmth, callus or dry skin  Left Foot:   Skin Integrity: Negative for ulcer, skin breakdown, erythema, warmth, callus or dry skin  Neurological: She is alert and oriented to person, place, and time  Skin: Skin is warm and dry  She is not diaphoretic  Psychiatric: She has a normal mood and affect  Her behavior is normal  Thought content normal    Vitals reviewed

## 2018-12-04 PROBLEM — K57.90 DIVERTICULOSIS: Status: ACTIVE | Noted: 2018-12-04

## 2019-01-25 DIAGNOSIS — R73.03 PREDIABETES: ICD-10-CM

## 2019-02-06 DIAGNOSIS — F41.9 ANXIETY: ICD-10-CM

## 2019-02-07 RX ORDER — ALPRAZOLAM 0.25 MG/1
0.25 TABLET ORAL
Qty: 30 TABLET | Refills: 0 | Status: SHIPPED | OUTPATIENT
Start: 2019-02-07 | End: 2019-05-17 | Stop reason: SDUPTHER

## 2019-02-07 NOTE — TELEPHONE ENCOUNTER
From: Jamal Ramirez  Sent: 2/6/2019 10:04 AM EST  Subject: Medication Renewal Request    Rene Bing Mota would like a refill of the following medications:     ALPRAZolam (XANAX) 0 25 mg tablet Hugh Ty PA-C]    Preferred pharmacy: [facility] [id number]

## 2019-02-11 DIAGNOSIS — E78.2 MIXED HYPERLIPIDEMIA: ICD-10-CM

## 2019-02-11 RX ORDER — ATORVASTATIN CALCIUM 20 MG/1
TABLET, FILM COATED ORAL
Qty: 90 TABLET | Refills: 1 | Status: SHIPPED | OUTPATIENT
Start: 2019-02-11 | End: 2019-05-07

## 2019-02-25 ENCOUNTER — APPOINTMENT (OUTPATIENT)
Dept: LAB | Facility: CLINIC | Age: 59
End: 2019-02-25
Payer: COMMERCIAL

## 2019-02-25 DIAGNOSIS — R73.03 PREDIABETES: ICD-10-CM

## 2019-02-25 LAB
EST. AVERAGE GLUCOSE BLD GHB EST-MCNC: 143 MG/DL
HBA1C MFR BLD: 6.6 % (ref 4.2–6.3)

## 2019-02-25 PROCEDURE — 83036 HEMOGLOBIN GLYCOSYLATED A1C: CPT

## 2019-02-25 PROCEDURE — 36415 COLL VENOUS BLD VENIPUNCTURE: CPT

## 2019-04-14 NOTE — ASSESSMENT & PLAN NOTE
Weight is increased 16 pounds in 3 months  Discussed need for weight loss   Pt agrees  - Discussed restarting Weight watchers  - Referral to Registered Dietitian no

## 2019-05-07 ENCOUNTER — OFFICE VISIT (OUTPATIENT)
Dept: FAMILY MEDICINE CLINIC | Facility: CLINIC | Age: 59
End: 2019-05-07
Payer: COMMERCIAL

## 2019-05-07 VITALS
TEMPERATURE: 98 F | HEART RATE: 63 BPM | OXYGEN SATURATION: 98 % | DIASTOLIC BLOOD PRESSURE: 74 MMHG | WEIGHT: 267 LBS | SYSTOLIC BLOOD PRESSURE: 128 MMHG | RESPIRATION RATE: 18 BRPM | BODY MASS INDEX: 45.58 KG/M2 | HEIGHT: 64 IN

## 2019-05-07 DIAGNOSIS — I82.890 SUPERFICIAL VEIN THROMBOSIS: Primary | ICD-10-CM

## 2019-05-07 DIAGNOSIS — E11.8 TYPE 2 DIABETES MELLITUS WITH COMPLICATION, WITHOUT LONG-TERM CURRENT USE OF INSULIN (HCC): ICD-10-CM

## 2019-05-07 DIAGNOSIS — Z11.59 NEED FOR HEPATITIS C SCREENING TEST: ICD-10-CM

## 2019-05-07 DIAGNOSIS — E66.01 CLASS 3 SEVERE OBESITY DUE TO EXCESS CALORIES WITHOUT SERIOUS COMORBIDITY WITH BODY MASS INDEX (BMI) OF 45.0 TO 49.9 IN ADULT (HCC): ICD-10-CM

## 2019-05-07 PROCEDURE — 99214 OFFICE O/P EST MOD 30 MIN: CPT | Performed by: PHYSICIAN ASSISTANT

## 2019-05-08 ENCOUNTER — HOSPITAL ENCOUNTER (OUTPATIENT)
Dept: RADIOLOGY | Facility: HOSPITAL | Age: 59
Discharge: HOME | End: 2019-05-08
Payer: COMMERCIAL

## 2019-05-08 DIAGNOSIS — R60.9 SWELLING: ICD-10-CM

## 2019-05-08 DIAGNOSIS — R52 PAIN: Primary | ICD-10-CM

## 2019-05-08 DIAGNOSIS — R52 PAIN: ICD-10-CM

## 2019-05-08 PROCEDURE — 93971 EXTREMITY STUDY: CPT

## 2019-05-08 PROCEDURE — 93971 EXTREMITY STUDY: CPT | Performed by: SURGERY

## 2019-05-17 DIAGNOSIS — F41.9 ANXIETY: ICD-10-CM

## 2019-05-17 RX ORDER — ALPRAZOLAM 0.25 MG/1
0.25 TABLET ORAL
Qty: 30 TABLET | Refills: 0 | Status: SHIPPED | OUTPATIENT
Start: 2019-05-17 | End: 2019-09-12

## 2019-05-20 ENCOUNTER — OFFICE VISIT (OUTPATIENT)
Dept: DIABETES SERVICES | Facility: CLINIC | Age: 59
End: 2019-05-20
Payer: COMMERCIAL

## 2019-05-20 DIAGNOSIS — E11.8 TYPE 2 DIABETES MELLITUS WITH COMPLICATION, WITHOUT LONG-TERM CURRENT USE OF INSULIN (HCC): Primary | ICD-10-CM

## 2019-05-20 PROCEDURE — G0108 DIAB MANAGE TRN  PER INDIV: HCPCS | Performed by: DIETITIAN, REGISTERED

## 2019-05-28 DIAGNOSIS — E11.8 TYPE 2 DIABETES MELLITUS WITH COMPLICATION, WITHOUT LONG-TERM CURRENT USE OF INSULIN (HCC): Primary | ICD-10-CM

## 2019-05-29 ENCOUNTER — APPOINTMENT (OUTPATIENT)
Dept: LAB | Facility: CLINIC | Age: 59
End: 2019-05-29
Payer: COMMERCIAL

## 2019-05-29 DIAGNOSIS — Z11.59 NEED FOR HEPATITIS C SCREENING TEST: ICD-10-CM

## 2019-05-29 DIAGNOSIS — E11.8 TYPE 2 DIABETES MELLITUS WITH COMPLICATION, WITHOUT LONG-TERM CURRENT USE OF INSULIN (HCC): ICD-10-CM

## 2019-05-29 LAB
CREAT UR-MCNC: 155 MG/DL
EST. AVERAGE GLUCOSE BLD GHB EST-MCNC: 131 MG/DL
HBA1C MFR BLD: 6.2 % (ref 4.2–6.3)
HCV AB SER QL: NORMAL
MICROALBUMIN UR-MCNC: 9.4 MG/L (ref 0–20)
MICROALBUMIN/CREAT 24H UR: 6 MG/G CREATININE (ref 0–30)

## 2019-05-29 PROCEDURE — 3061F NEG MICROALBUMINURIA REV: CPT | Performed by: PHYSICIAN ASSISTANT

## 2019-05-29 PROCEDURE — 86803 HEPATITIS C AB TEST: CPT

## 2019-05-29 PROCEDURE — 83036 HEMOGLOBIN GLYCOSYLATED A1C: CPT

## 2019-05-29 PROCEDURE — 36415 COLL VENOUS BLD VENIPUNCTURE: CPT

## 2019-05-29 PROCEDURE — 82043 UR ALBUMIN QUANTITATIVE: CPT

## 2019-05-29 PROCEDURE — 82570 ASSAY OF URINE CREATININE: CPT

## 2019-06-11 ENCOUNTER — OFFICE VISIT (OUTPATIENT)
Dept: FAMILY MEDICINE CLINIC | Facility: CLINIC | Age: 59
End: 2019-06-11
Payer: COMMERCIAL

## 2019-06-11 VITALS
RESPIRATION RATE: 18 BRPM | HEIGHT: 64 IN | BODY MASS INDEX: 45.24 KG/M2 | HEART RATE: 85 BPM | SYSTOLIC BLOOD PRESSURE: 124 MMHG | OXYGEN SATURATION: 98 % | DIASTOLIC BLOOD PRESSURE: 84 MMHG | WEIGHT: 265 LBS

## 2019-06-11 DIAGNOSIS — K57.90 DIVERTICULOSIS: ICD-10-CM

## 2019-06-11 DIAGNOSIS — E11.8 TYPE 2 DIABETES MELLITUS WITH COMPLICATION, WITHOUT LONG-TERM CURRENT USE OF INSULIN (HCC): ICD-10-CM

## 2019-06-11 DIAGNOSIS — E66.01 CLASS 3 SEVERE OBESITY DUE TO EXCESS CALORIES WITHOUT SERIOUS COMORBIDITY WITH BODY MASS INDEX (BMI) OF 45.0 TO 49.9 IN ADULT (HCC): ICD-10-CM

## 2019-06-11 DIAGNOSIS — Z23 ENCOUNTER FOR IMMUNIZATION: ICD-10-CM

## 2019-06-11 DIAGNOSIS — K74.69 OTHER CIRRHOSIS OF LIVER (HCC): ICD-10-CM

## 2019-06-11 DIAGNOSIS — Z12.39 SCREENING FOR BREAST CANCER: Primary | ICD-10-CM

## 2019-06-11 DIAGNOSIS — F41.9 ANXIETY: ICD-10-CM

## 2019-06-11 PROCEDURE — 90471 IMMUNIZATION ADMIN: CPT | Performed by: PHYSICIAN ASSISTANT

## 2019-06-11 PROCEDURE — 3008F BODY MASS INDEX DOCD: CPT | Performed by: PHYSICIAN ASSISTANT

## 2019-06-11 PROCEDURE — 4004F PT TOBACCO SCREEN RCVD TLK: CPT | Performed by: PHYSICIAN ASSISTANT

## 2019-06-11 PROCEDURE — 99214 OFFICE O/P EST MOD 30 MIN: CPT | Performed by: PHYSICIAN ASSISTANT

## 2019-06-11 PROCEDURE — 90670 PCV13 VACCINE IM: CPT | Performed by: PHYSICIAN ASSISTANT

## 2019-06-18 ENCOUNTER — TELEPHONE (OUTPATIENT)
Dept: FAMILY MEDICINE CLINIC | Facility: CLINIC | Age: 59
End: 2019-06-18

## 2019-06-18 DIAGNOSIS — E11.8 TYPE 2 DIABETES MELLITUS WITH COMPLICATION, WITHOUT LONG-TERM CURRENT USE OF INSULIN (HCC): Primary | ICD-10-CM

## 2019-07-22 DIAGNOSIS — I10 ESSENTIAL HYPERTENSION: ICD-10-CM

## 2019-07-22 RX ORDER — LISINOPRIL AND HYDROCHLOROTHIAZIDE 12.5; 1 MG/1; MG/1
1 TABLET ORAL DAILY
Qty: 90 TABLET | Refills: 0 | Status: SHIPPED | OUTPATIENT
Start: 2019-07-22 | End: 2019-10-18 | Stop reason: SDUPTHER

## 2019-08-11 DIAGNOSIS — E78.2 MIXED HYPERLIPIDEMIA: ICD-10-CM

## 2019-08-11 DIAGNOSIS — R73.03 PREDIABETES: ICD-10-CM

## 2019-08-12 RX ORDER — ATORVASTATIN CALCIUM 20 MG/1
TABLET, FILM COATED ORAL
Qty: 90 TABLET | Refills: 1 | Status: SHIPPED | OUTPATIENT
Start: 2019-08-12 | End: 2019-09-12

## 2019-08-20 ENCOUNTER — TELEPHONE (OUTPATIENT)
Dept: DIABETES SERVICES | Facility: CLINIC | Age: 59
End: 2019-08-20

## 2019-09-12 ENCOUNTER — OFFICE VISIT (OUTPATIENT)
Dept: FAMILY MEDICINE CLINIC | Facility: CLINIC | Age: 59
End: 2019-09-12
Payer: COMMERCIAL

## 2019-09-12 VITALS
DIASTOLIC BLOOD PRESSURE: 82 MMHG | WEIGHT: 269 LBS | RESPIRATION RATE: 16 BRPM | HEART RATE: 81 BPM | OXYGEN SATURATION: 98 % | HEIGHT: 64 IN | SYSTOLIC BLOOD PRESSURE: 128 MMHG | BODY MASS INDEX: 45.93 KG/M2

## 2019-09-12 DIAGNOSIS — F41.9 ANXIETY: ICD-10-CM

## 2019-09-12 DIAGNOSIS — I10 ESSENTIAL HYPERTENSION: Primary | ICD-10-CM

## 2019-09-12 DIAGNOSIS — E66.01 CLASS 3 SEVERE OBESITY DUE TO EXCESS CALORIES WITHOUT SERIOUS COMORBIDITY WITH BODY MASS INDEX (BMI) OF 45.0 TO 49.9 IN ADULT (HCC): ICD-10-CM

## 2019-09-12 DIAGNOSIS — K74.69 OTHER CIRRHOSIS OF LIVER (HCC): ICD-10-CM

## 2019-09-12 DIAGNOSIS — Z72.0 TOBACCO USE: ICD-10-CM

## 2019-09-12 DIAGNOSIS — E78.2 MIXED HYPERLIPIDEMIA: ICD-10-CM

## 2019-09-12 DIAGNOSIS — E11.8 TYPE 2 DIABETES MELLITUS WITH COMPLICATION, WITHOUT LONG-TERM CURRENT USE OF INSULIN (HCC): ICD-10-CM

## 2019-09-12 PROCEDURE — 99214 OFFICE O/P EST MOD 30 MIN: CPT | Performed by: PHYSICIAN ASSISTANT

## 2019-09-12 PROCEDURE — 3079F DIAST BP 80-89 MM HG: CPT | Performed by: PHYSICIAN ASSISTANT

## 2019-09-12 PROCEDURE — 3074F SYST BP LT 130 MM HG: CPT | Performed by: PHYSICIAN ASSISTANT

## 2019-09-12 PROCEDURE — 3008F BODY MASS INDEX DOCD: CPT | Performed by: PHYSICIAN ASSISTANT

## 2019-09-12 RX ORDER — ALPRAZOLAM 0.25 MG/1
0.25 TABLET ORAL
Qty: 30 TABLET | Refills: 0 | Status: SHIPPED | OUTPATIENT
Start: 2019-09-12 | End: 2019-12-17 | Stop reason: SDUPTHER

## 2019-09-12 RX ORDER — BLOOD SUGAR DIAGNOSTIC
STRIP MISCELLANEOUS
Refills: 3 | COMMUNITY
Start: 2019-06-28 | End: 2020-06-24

## 2019-09-12 NOTE — ASSESSMENT & PLAN NOTE
A1C of 6 2, normal microalbumin/creatnine ratio  Average glucometer readings of 123 over past 3 months  Managed on Metformin 500 mg BID  Patient will FU with diabetes education in November  - Directed patient to increase frequency of glucometer testing  - Continue metformin 500 mg BID

## 2019-09-12 NOTE — ASSESSMENT & PLAN NOTE
Decreased to 1 cigarettes every 2-3 days  She is still planning on quitting  Discussed likely that it is more behavioral issue and not a nicotine addiction

## 2019-09-12 NOTE — ASSESSMENT & PLAN NOTE
No significant weight change   - Recommended FU with weight management   Order previously provided  - CBC, CMP, TSH, Lipid, A1C

## 2019-09-12 NOTE — ASSESSMENT & PLAN NOTE
Intermittent anxiety only based on specific triggers  Patient has been on Xanax 0 25 mg for 6 years and is gradually decreasing her use  [place] and [place]; no red flags identified; safe to proceed with prescriptions  - Directed FU with Therapist  Provided list of providers  We will revisit at follow-up  - Refilled Xanax 0 25 mg 30 tablets   Last refill 4 months prior

## 2019-09-12 NOTE — PROGRESS NOTES
Assessment/Plan:    Tobacco use  Decreased to 1 cigarettes every 2-3 days  She is still planning on quitting  Discussed likely that it is more behavioral issue and not a nicotine addiction  Type 2 diabetes mellitus with complication, without long-term current use of insulin (HCC)  A1C of 6 2, normal microalbumin/creatnine ratio  Average glucometer readings of 123 over past 3 months  Managed on Metformin 500 mg BID  Patient will FU with diabetes education in November  - Directed patient to increase frequency of glucometer testing  - Continue metformin 500 mg BID    Mixed hyperlipidemia  Currently well managed on atorvastatin 20 mg  - Ordered lipid panel  - Continue Lipitor 20 mg    Anxiety  Intermittent anxiety only based on specific triggers  Patient has been on Xanax 0 25 mg for 6 years and is gradually decreasing her use  [place]; no red flags identified; safe to proceed with prescriptions  - Directed FU with Therapist  Provided list of providers  We will revisit at follow-up  - Refilled Xanax 0 25 mg 30 tablets  Last refill 4 months prior    Other cirrhosis of liver ([facility] [address] )  Managed by [facility]  She has RUQ US scheduled    Class 3 severe obesity due to excess calories without serious comorbidity with body mass index (BMI) of 45 0 to 49 9 in adult ([facility] [address] )  No significant weight change   - Recommended FU with weight management  Order previously provided  - CBC, CMP, TSH, Lipid, A1C    FU scheduled in 3 months           Subjective:    Patient ID: Braxton Russell is a 59 y o  female  Pt is presenting today for 3 month FU for HTN, DM, Obesity  Her anxiety is largely well managed  She feels her anxiety is better in the summer  Triggers include being an automobile passenger, where she feels she does not have control  She has used 26 tablets of Xanax over the past 4 months  She has not seen a therapist/  Average 90 day blood sugar of 123   She denies any episodes of hypoglycemia  Specialists: [facility]   She is going to schedule an US of RUQ to assess liver  She has Mammogram scheduled next month  She is planning on going to November Diabetes education class  She had eye Exam performed in April at [facility]  The following portions of the patient's history were reviewed and updated as appropriate: allergies, current medications and problem list     Review of Systems   Constitutional: Negative for fatigue, fever and unexpected weight change  HENT: Negative for rhinorrhea and sore throat  Respiratory: Negative for cough, shortness of breath and wheezing  Cardiovascular: Negative for chest pain, palpitations and leg swelling  Gastrointestinal: Negative for constipation, diarrhea and nausea  Musculoskeletal: Negative for arthralgias and myalgias  Psychiatric/Behavioral: Negative for sleep disturbance  The patient is nervous/anxious (see HPI)  Objective:  /82 (BP Location: Left arm, Patient Position: Sitting, Cuff Size: Large)   Pulse 81   Resp 16   Ht 5' 4" (1 626 m)   Wt 122 kg (269 lb)   SpO2 98%   BMI 46 17 kg/m²      Physical Exam   Constitutional: She is oriented to person, place, and time  She appears well-developed and well-nourished  No distress  HENT:   Head: Normocephalic and atraumatic  Right Ear: External ear normal    Left Ear: External ear normal    Mouth/Throat: Oropharynx is clear and moist  No oropharyngeal exudate  Eyes: Pupils are equal, round, and reactive to light  Neck: Normal range of motion  Neck supple  No thyromegaly present  Cardiovascular: Normal rate, regular rhythm, normal heart sounds and intact distal pulses  Exam reveals no gallop and no friction rub  No murmur heard  Pulmonary/Chest: Effort normal and breath sounds normal  No respiratory distress  She has no wheezes  She has no rales  Abdominal: Soft  Bowel sounds are normal  She exhibits no distension  There is hepatomegaly  There is no tenderness  There is no rebound and no guarding  obese     Musculoskeletal: Normal range of motion  She exhibits no edema or deformity  Lymphadenopathy:     She has no cervical adenopathy  Neurological: She is alert and oriented to person, place, and time  Skin: Skin is warm and dry  No rash noted  She is not diaphoretic  No erythema  Psychiatric: She has a normal mood and affect   Her behavior is normal  Thought content normal

## 2019-10-01 ENCOUNTER — TRANSCRIBE ORDERS (OUTPATIENT)
Dept: ADMINISTRATIVE | Facility: HOSPITAL | Age: 59
End: 2019-10-01

## 2019-10-01 DIAGNOSIS — K74.60 CIRRHOSIS OF LIVER WITHOUT ASCITES, UNSPECIFIED HEPATIC CIRRHOSIS TYPE (HCC): Primary | ICD-10-CM

## 2019-10-09 ENCOUNTER — HOSPITAL ENCOUNTER (OUTPATIENT)
Dept: ULTRASOUND IMAGING | Facility: HOSPITAL | Age: 59
Discharge: HOME | End: 2019-10-09
Attending: INTERNAL MEDICINE
Payer: COMMERCIAL

## 2019-10-09 DIAGNOSIS — K74.60 CIRRHOSIS OF LIVER WITHOUT ASCITES, UNSPECIFIED HEPATIC CIRRHOSIS TYPE (HCC): ICD-10-CM

## 2019-10-09 PROCEDURE — 76705 ECHO EXAM OF ABDOMEN: CPT

## 2019-10-18 DIAGNOSIS — I10 ESSENTIAL HYPERTENSION: ICD-10-CM

## 2019-10-18 RX ORDER — LISINOPRIL AND HYDROCHLOROTHIAZIDE 12.5; 1 MG/1; MG/1
1 TABLET ORAL DAILY
Qty: 90 TABLET | Refills: 0 | Status: SHIPPED | OUTPATIENT
Start: 2019-10-18 | End: 2020-01-10

## 2019-10-19 LAB
LEFT EYE DIABETIC RETINOPATHY: NORMAL
RIGHT EYE DIABETIC RETINOPATHY: NORMAL

## 2019-11-16 ENCOUNTER — HOSPITAL ENCOUNTER (OUTPATIENT)
Dept: MAMMOGRAPHY | Facility: HOSPITAL | Age: 59
Discharge: HOME | End: 2019-11-16
Payer: COMMERCIAL

## 2019-11-16 VITALS — WEIGHT: 269 LBS | HEIGHT: 64 IN | BODY MASS INDEX: 45.93 KG/M2

## 2019-11-16 DIAGNOSIS — Z12.39 SCREENING FOR BREAST CANCER: ICD-10-CM

## 2019-11-16 PROCEDURE — 77063 BREAST TOMOSYNTHESIS BI: CPT

## 2019-11-16 PROCEDURE — 77067 SCR MAMMO BI INCL CAD: CPT

## 2019-12-10 ENCOUNTER — APPOINTMENT (OUTPATIENT)
Dept: LAB | Facility: CLINIC | Age: 59
End: 2019-12-10
Payer: COMMERCIAL

## 2019-12-10 ENCOUNTER — TRANSCRIBE ORDERS (OUTPATIENT)
Dept: LAB | Facility: CLINIC | Age: 59
End: 2019-12-10

## 2019-12-10 DIAGNOSIS — I10 ESSENTIAL HYPERTENSION: ICD-10-CM

## 2019-12-10 DIAGNOSIS — E66.01 CLASS 3 SEVERE OBESITY DUE TO EXCESS CALORIES WITHOUT SERIOUS COMORBIDITY WITH BODY MASS INDEX (BMI) OF 45.0 TO 49.9 IN ADULT (HCC): ICD-10-CM

## 2019-12-10 DIAGNOSIS — E78.2 MIXED HYPERLIPIDEMIA: ICD-10-CM

## 2019-12-10 DIAGNOSIS — K74.60 HEPATIC CIRRHOSIS, UNSPECIFIED HEPATIC CIRRHOSIS TYPE, UNSPECIFIED WHETHER ASCITES PRESENT (HCC): Primary | ICD-10-CM

## 2019-12-10 DIAGNOSIS — E11.8 TYPE 2 DIABETES MELLITUS WITH COMPLICATION, WITHOUT LONG-TERM CURRENT USE OF INSULIN (HCC): ICD-10-CM

## 2019-12-10 LAB
AFP-TM SERPL-MCNC: 4.6 NG/ML (ref 0.5–8)
ALBUMIN SERPL BCP-MCNC: 3.9 G/DL (ref 3.5–5)
ALBUMIN SERPL BCP-MCNC: 4.1 G/DL (ref 3.5–5)
ALP SERPL-CCNC: 87 U/L (ref 46–116)
ALP SERPL-CCNC: 89 U/L (ref 46–116)
ALT SERPL W P-5'-P-CCNC: 26 U/L (ref 12–78)
ALT SERPL W P-5'-P-CCNC: 27 U/L (ref 12–78)
ANION GAP SERPL CALCULATED.3IONS-SCNC: 3 MMOL/L (ref 4–13)
ANION GAP SERPL CALCULATED.3IONS-SCNC: 4 MMOL/L (ref 4–13)
AST SERPL W P-5'-P-CCNC: 16 U/L (ref 5–45)
AST SERPL W P-5'-P-CCNC: 17 U/L (ref 5–45)
BASOPHILS # BLD AUTO: 0.05 THOUSANDS/ΜL (ref 0–0.1)
BASOPHILS NFR BLD AUTO: 1 % (ref 0–1)
BILIRUB SERPL-MCNC: 0.57 MG/DL (ref 0.2–1)
BILIRUB SERPL-MCNC: 0.63 MG/DL (ref 0.2–1)
BUN SERPL-MCNC: 11 MG/DL (ref 5–25)
BUN SERPL-MCNC: 11 MG/DL (ref 5–25)
CALCIUM SERPL-MCNC: 9.3 MG/DL (ref 8.3–10.1)
CALCIUM SERPL-MCNC: 9.4 MG/DL (ref 8.3–10.1)
CHLORIDE SERPL-SCNC: 106 MMOL/L (ref 100–108)
CHLORIDE SERPL-SCNC: 107 MMOL/L (ref 100–108)
CHOLEST SERPL-MCNC: 133 MG/DL (ref 50–200)
CO2 SERPL-SCNC: 29 MMOL/L (ref 21–32)
CO2 SERPL-SCNC: 30 MMOL/L (ref 21–32)
CREAT SERPL-MCNC: 0.64 MG/DL (ref 0.6–1.3)
CREAT SERPL-MCNC: 0.68 MG/DL (ref 0.6–1.3)
CREAT UR-MCNC: 143 MG/DL
EOSINOPHIL # BLD AUTO: 0.15 THOUSAND/ΜL (ref 0–0.61)
EOSINOPHIL NFR BLD AUTO: 4 % (ref 0–6)
ERYTHROCYTE [DISTWIDTH] IN BLOOD BY AUTOMATED COUNT: 12.6 % (ref 11.6–15.1)
EST. AVERAGE GLUCOSE BLD GHB EST-MCNC: 131 MG/DL
GFR SERPL CREATININE-BSD FRML MDRD: 95 ML/MIN/1.73SQ M
GFR SERPL CREATININE-BSD FRML MDRD: 97 ML/MIN/1.73SQ M
GLUCOSE P FAST SERPL-MCNC: 102 MG/DL (ref 65–99)
GLUCOSE P FAST SERPL-MCNC: 111 MG/DL (ref 65–99)
HBA1C MFR BLD: 6.2 % (ref 4.2–6.3)
HCT VFR BLD AUTO: 44.6 % (ref 34.8–46.1)
HDLC SERPL-MCNC: 49 MG/DL
HGB BLD-MCNC: 14.4 G/DL (ref 11.5–15.4)
IMM GRANULOCYTES # BLD AUTO: 0.01 THOUSAND/UL (ref 0–0.2)
IMM GRANULOCYTES NFR BLD AUTO: 0 % (ref 0–2)
INR PPP: 1.04 (ref 0.84–1.19)
LDLC SERPL CALC-MCNC: 70 MG/DL (ref 0–100)
LYMPHOCYTES # BLD AUTO: 1.22 THOUSANDS/ΜL (ref 0.6–4.47)
LYMPHOCYTES NFR BLD AUTO: 30 % (ref 14–44)
MCH RBC QN AUTO: 31 PG (ref 26.8–34.3)
MCHC RBC AUTO-ENTMCNC: 32.3 G/DL (ref 31.4–37.4)
MCV RBC AUTO: 96 FL (ref 82–98)
MICROALBUMIN UR-MCNC: 8.4 MG/L (ref 0–20)
MICROALBUMIN/CREAT 24H UR: 6 MG/G CREATININE (ref 0–30)
MONOCYTES # BLD AUTO: 0.32 THOUSAND/ΜL (ref 0.17–1.22)
MONOCYTES NFR BLD AUTO: 8 % (ref 4–12)
NEUTROPHILS # BLD AUTO: 2.28 THOUSANDS/ΜL (ref 1.85–7.62)
NEUTS SEG NFR BLD AUTO: 57 % (ref 43–75)
NRBC BLD AUTO-RTO: 0 /100 WBCS
PLATELET # BLD AUTO: 154 THOUSANDS/UL (ref 149–390)
PMV BLD AUTO: 10.9 FL (ref 8.9–12.7)
POTASSIUM SERPL-SCNC: 4.4 MMOL/L (ref 3.5–5.3)
POTASSIUM SERPL-SCNC: 4.5 MMOL/L (ref 3.5–5.3)
PROT SERPL-MCNC: 7.1 G/DL (ref 6.4–8.2)
PROT SERPL-MCNC: 7.1 G/DL (ref 6.4–8.2)
PROTHROMBIN TIME: 13.2 SECONDS (ref 11.6–14.5)
RBC # BLD AUTO: 4.65 MILLION/UL (ref 3.81–5.12)
SODIUM SERPL-SCNC: 138 MMOL/L (ref 136–145)
SODIUM SERPL-SCNC: 141 MMOL/L (ref 136–145)
TRIGL SERPL-MCNC: 71 MG/DL
TSH SERPL DL<=0.05 MIU/L-ACNC: 2.91 UIU/ML (ref 0.36–3.74)
WBC # BLD AUTO: 4.03 THOUSAND/UL (ref 4.31–10.16)

## 2019-12-10 PROCEDURE — 84443 ASSAY THYROID STIM HORMONE: CPT

## 2019-12-10 PROCEDURE — 83036 HEMOGLOBIN GLYCOSYLATED A1C: CPT

## 2019-12-10 PROCEDURE — 80053 COMPREHEN METABOLIC PANEL: CPT

## 2019-12-10 PROCEDURE — 36415 COLL VENOUS BLD VENIPUNCTURE: CPT

## 2019-12-10 PROCEDURE — 82570 ASSAY OF URINE CREATININE: CPT | Performed by: PHYSICIAN ASSISTANT

## 2019-12-10 PROCEDURE — 80061 LIPID PANEL: CPT

## 2019-12-10 PROCEDURE — 85025 COMPLETE CBC W/AUTO DIFF WBC: CPT

## 2019-12-10 PROCEDURE — 85610 PROTHROMBIN TIME: CPT

## 2019-12-10 PROCEDURE — 82043 UR ALBUMIN QUANTITATIVE: CPT | Performed by: PHYSICIAN ASSISTANT

## 2019-12-10 PROCEDURE — 82105 ALPHA-FETOPROTEIN SERUM: CPT

## 2019-12-17 ENCOUNTER — OFFICE VISIT (OUTPATIENT)
Dept: FAMILY MEDICINE CLINIC | Facility: CLINIC | Age: 59
End: 2019-12-17
Payer: COMMERCIAL

## 2019-12-17 VITALS
BODY MASS INDEX: 46.44 KG/M2 | HEIGHT: 64 IN | WEIGHT: 272 LBS | SYSTOLIC BLOOD PRESSURE: 128 MMHG | OXYGEN SATURATION: 98 % | RESPIRATION RATE: 14 BRPM | HEART RATE: 67 BPM | DIASTOLIC BLOOD PRESSURE: 78 MMHG

## 2019-12-17 DIAGNOSIS — E66.01 CLASS 3 SEVERE OBESITY DUE TO EXCESS CALORIES WITHOUT SERIOUS COMORBIDITY WITH BODY MASS INDEX (BMI) OF 45.0 TO 49.9 IN ADULT (HCC): ICD-10-CM

## 2019-12-17 DIAGNOSIS — Z72.0 TOBACCO USE: ICD-10-CM

## 2019-12-17 DIAGNOSIS — E11.8 TYPE 2 DIABETES MELLITUS WITH COMPLICATION, WITHOUT LONG-TERM CURRENT USE OF INSULIN (HCC): Primary | ICD-10-CM

## 2019-12-17 DIAGNOSIS — Z00.00 ROUTINE GENERAL MEDICAL EXAMINATION AT A HEALTH CARE FACILITY: ICD-10-CM

## 2019-12-17 DIAGNOSIS — F41.9 ANXIETY: ICD-10-CM

## 2019-12-17 DIAGNOSIS — E78.2 MIXED HYPERLIPIDEMIA: ICD-10-CM

## 2019-12-17 DIAGNOSIS — I10 ESSENTIAL HYPERTENSION: ICD-10-CM

## 2019-12-17 PROCEDURE — 99396 PREV VISIT EST AGE 40-64: CPT | Performed by: PHYSICIAN ASSISTANT

## 2019-12-17 RX ORDER — ALPRAZOLAM 0.25 MG/1
0.25 TABLET ORAL
Qty: 20 TABLET | Refills: 0 | Status: SHIPPED | OUTPATIENT
Start: 2019-12-17 | End: 2020-03-08 | Stop reason: SDUPTHER

## 2019-12-18 NOTE — ASSESSMENT & PLAN NOTE
Lab Results   Component Value Date    HGBA1C 6 2 12/10/2019     Well managed on metformin 500 mg QD  - Directed patient to check more often and directed to take metformin 500 mg BID as directed    - Foot Exam performed today, normal results  - Eye Exam done at [facility]

## 2019-12-18 NOTE — PROGRESS NOTES
Assessment/Plan:    Type 2 diabetes mellitus with complication, without long-term current use of insulin (HCC)    Lab Results   Component Value Date    HGBA1C 6 2 12/10/2019     Well managed on metformin 500 mg QD  - Directed patient to check more often and directed to take metformin 500 mg BID as directed  - Foot Exam performed today, normal results  - Eye Exam done at [facility]    Anxiety  Intermittent, primarily with  driving when she feels lack of control  Patient has used Xanax 0 25 mg for 6 years  [place]; no red flags identified; safe to proceed with prescriptions  - Refilled Xanax 0 25 mg 20 tablets, last refill 3 months prior    Mixed hyperlipidemia  Well managed on atorvastatin 20 mg    Tobacco use  Continues to smoke 2 cig a day  Plans to quit at start of new year  - Directed to RTC with any difficulty   - Discussed nicotine replacement options for short term use    Class 3 severe obesity due to excess calories without serious comorbidity with body mass index (BMI) of 45 0 to 49 9 in adult (HCC)  No significant weight change or change in physical activity  Essential hypertension  Currently well managed on lisinopril-HCTZ 10-12 5    Mammogram done last month with normal results    FU in 6 months           Subjective:    Patient ID: Nevaeh Dodd is a 62 y o  female  Pt is presenting today for 3 month FU of anxiety, HTN, HLD, DM and annual physical     She has gained weight and continues to smoke 2 cig a day  She plans on quitting the first of the year  She has been checking once a week  Average blood sugar over the last 3 months 118  She has been taking metformin 500 mg QD  Anxiety occurs with  driving car  She is requesting a refill of Xanax  Last refill 3 months ago  Eye exam was one month ago, [facility] [address]  She saw Rhonda [facility] and they state the labs and US were normal  She is going to have a yearly EGD in January      The following portions of the patient's history were reviewed and updated as appropriate: allergies, current medications and problem list     Review of Systems   Constitutional: Negative for activity change, chills, fatigue, fever and unexpected weight change  HENT: Negative for rhinorrhea, sinus pressure and sore throat  Eyes: Negative for visual disturbance  Respiratory: Negative for cough, shortness of breath and wheezing  Cardiovascular: Negative for chest pain, palpitations and leg swelling  Gastrointestinal: Negative for abdominal pain, constipation, diarrhea and nausea  Endocrine: Negative for cold intolerance and heat intolerance  Genitourinary: Negative for difficulty urinating  Musculoskeletal: Negative for arthralgias and myalgias  Skin: Negative for pallor, rash and wound  Allergic/Immunologic: Negative for environmental allergies and food allergies  Neurological: Negative for dizziness, seizures, speech difficulty, weakness and headaches  Hematological: Negative for adenopathy  Psychiatric/Behavioral: Negative for behavioral problems, dysphoric mood and sleep disturbance  The patient is nervous/anxious  Objective:  /78 (BP Location: Left arm, Patient Position: Sitting, Cuff Size: Large)   Pulse 67   Resp 14   Ht 5' 4" (1 626 m)   Wt 123 kg (272 lb)   SpO2 98%   BMI 46 69 kg/m²      Physical Exam   Constitutional: She is oriented to person, place, and time  She appears well-developed and well-nourished  No distress  HENT:   Head: Normocephalic and atraumatic  Right Ear: External ear normal    Left Ear: External ear normal    Mouth/Throat: Oropharynx is clear and moist  No oropharyngeal exudate  Eyes: Pupils are equal, round, and reactive to light  Neck: Normal range of motion  Neck supple  No thyromegaly present  Cardiovascular: Normal rate, regular rhythm, normal heart sounds and intact distal pulses  Exam reveals no gallop and no friction rub  Pulses are no weak pulses  No murmur heard  Pulses:       Dorsalis pedis pulses are 2+ on the right side, and 2+ on the left side  Pulmonary/Chest: Effort normal and breath sounds normal  No respiratory distress  She has no wheezes  She has no rales  Abdominal: Soft  Bowel sounds are normal  She exhibits no distension  There is no tenderness  There is no rebound and no guarding  Obese   Musculoskeletal: Normal range of motion  She exhibits no edema or deformity  Feet:   Right Foot:   Skin Integrity: Negative for ulcer, skin breakdown, erythema, warmth, callus or dry skin  Left Foot:   Skin Integrity: Negative for ulcer, skin breakdown, erythema, warmth, callus or dry skin  Lymphadenopathy:     She has no cervical adenopathy  Neurological: She is alert and oriented to person, place, and time  Skin: Skin is warm and dry  No rash noted  She is not diaphoretic  No erythema  varicose veins on lower legs   Psychiatric: She has a normal mood and affect  Her behavior is normal  Thought content normal    Vitals reviewed  Patient's shoes and socks removed  Right Foot/Ankle   Right Foot Inspection  Skin Exam: skin normal skin not intact, no dry skin, no warmth, no callus, no erythema, no maceration, no abnormal color, no pre-ulcer, no ulcer and no callus                          Toe Exam: ROM and strength within normal limits  Sensory   Vibration: intact  Proprioception: intact   Monofilament testing: intact  Vascular  Capillary refills: < 3 seconds  The right DP pulse is 2+  Left Foot/Ankle  Left Foot Inspection  Skin Exam: skin normalskin not intact, no dry skin, no warmth, no erythema, no maceration, normal color, no pre-ulcer, no ulcer and no callus                         Toe Exam: ROM and strength within normal limits                   Sensory   Vibration: intact  Proprioception: intact  Monofilament: intact  Vascular  Capillary refills: < 3 seconds  The left DP pulse is 2+     Assign Risk Category:  Deformity present; Loss of protective sensation;  No weak pulses       Risk: 0

## 2019-12-18 NOTE — ASSESSMENT & PLAN NOTE
Continues to smoke 2 cig a day  Plans to quit at start of new year  - Directed to RTC with any difficulty   - Discussed nicotine replacement options for short term use

## 2019-12-18 NOTE — ASSESSMENT & PLAN NOTE
Intermittent, primarily with  driving when she feels lack of control  Patient has used Xanax 0 25 mg for 6 years  [place]; no red flags identified; safe to proceed with prescriptions     - Refilled Xanax 0 25 mg 20 tablets, last refill 3 months prior

## 2020-01-10 DIAGNOSIS — I10 ESSENTIAL HYPERTENSION: ICD-10-CM

## 2020-01-10 RX ORDER — LISINOPRIL AND HYDROCHLOROTHIAZIDE 12.5; 1 MG/1; MG/1
TABLET ORAL
Qty: 90 TABLET | Refills: 0 | Status: SHIPPED | OUTPATIENT
Start: 2020-01-10 | End: 2020-04-07

## 2020-02-08 DIAGNOSIS — R73.03 PREDIABETES: ICD-10-CM

## 2020-02-08 DIAGNOSIS — E78.2 MIXED HYPERLIPIDEMIA: ICD-10-CM

## 2020-02-10 RX ORDER — ATORVASTATIN CALCIUM 20 MG/1
TABLET, FILM COATED ORAL
Qty: 90 TABLET | Refills: 1 | Status: SHIPPED | OUTPATIENT
Start: 2020-02-10 | End: 2020-08-04

## 2020-03-08 DIAGNOSIS — F41.9 ANXIETY: ICD-10-CM

## 2020-03-09 RX ORDER — ALPRAZOLAM 0.25 MG/1
0.25 TABLET ORAL
Qty: 20 TABLET | Refills: 0 | Status: SHIPPED | OUTPATIENT
Start: 2020-03-09 | End: 2020-06-01 | Stop reason: SDUPTHER

## 2020-04-07 DIAGNOSIS — I10 ESSENTIAL HYPERTENSION: ICD-10-CM

## 2020-04-07 RX ORDER — LISINOPRIL AND HYDROCHLOROTHIAZIDE 12.5; 1 MG/1; MG/1
TABLET ORAL
Qty: 90 TABLET | Refills: 0 | Status: SHIPPED | OUTPATIENT
Start: 2020-04-07 | End: 2020-04-10

## 2020-04-09 DIAGNOSIS — I10 ESSENTIAL HYPERTENSION: ICD-10-CM

## 2020-04-10 RX ORDER — LISINOPRIL AND HYDROCHLOROTHIAZIDE 12.5; 1 MG/1; MG/1
TABLET ORAL
Qty: 90 TABLET | Refills: 0 | Status: SHIPPED | OUTPATIENT
Start: 2020-04-10 | End: 2020-07-08

## 2020-06-01 DIAGNOSIS — I10 ESSENTIAL HYPERTENSION: ICD-10-CM

## 2020-06-01 DIAGNOSIS — F41.9 ANXIETY: ICD-10-CM

## 2020-06-01 DIAGNOSIS — E11.8 TYPE 2 DIABETES MELLITUS WITH COMPLICATION, WITHOUT LONG-TERM CURRENT USE OF INSULIN (HCC): Primary | ICD-10-CM

## 2020-06-02 RX ORDER — ALPRAZOLAM 0.25 MG/1
0.25 TABLET ORAL
Qty: 20 TABLET | Refills: 0 | Status: SHIPPED | OUTPATIENT
Start: 2020-06-02 | End: 2020-09-17 | Stop reason: SDUPTHER

## 2020-06-11 ENCOUNTER — APPOINTMENT (OUTPATIENT)
Dept: LAB | Facility: CLINIC | Age: 60
End: 2020-06-11
Payer: COMMERCIAL

## 2020-06-11 ENCOUNTER — TRANSCRIBE ORDERS (OUTPATIENT)
Dept: LAB | Facility: CLINIC | Age: 60
End: 2020-06-11

## 2020-06-11 DIAGNOSIS — K74.69 OTHER CIRRHOSIS OF LIVER (HCC): Primary | ICD-10-CM

## 2020-06-11 DIAGNOSIS — E11.8 TYPE 2 DIABETES MELLITUS WITH COMPLICATION, WITHOUT LONG-TERM CURRENT USE OF INSULIN (HCC): ICD-10-CM

## 2020-06-11 DIAGNOSIS — I10 ESSENTIAL HYPERTENSION: ICD-10-CM

## 2020-06-11 DIAGNOSIS — K74.69 OTHER CIRRHOSIS OF LIVER (HCC): ICD-10-CM

## 2020-06-11 LAB
ALBUMIN SERPL BCP-MCNC: 3.5 G/DL (ref 3.5–5)
ALP SERPL-CCNC: 91 U/L (ref 46–116)
ALT SERPL W P-5'-P-CCNC: 37 U/L (ref 12–78)
ANION GAP SERPL CALCULATED.3IONS-SCNC: 2 MMOL/L (ref 4–13)
AST SERPL W P-5'-P-CCNC: 23 U/L (ref 5–45)
BASOPHILS # BLD AUTO: 0.05 THOUSANDS/ΜL (ref 0–0.1)
BASOPHILS NFR BLD AUTO: 1 % (ref 0–1)
BILIRUB SERPL-MCNC: 0.81 MG/DL (ref 0.2–1)
BUN SERPL-MCNC: 11 MG/DL (ref 5–25)
CALCIUM SERPL-MCNC: 9 MG/DL (ref 8.3–10.1)
CHLORIDE SERPL-SCNC: 105 MMOL/L (ref 100–108)
CO2 SERPL-SCNC: 29 MMOL/L (ref 21–32)
CREAT SERPL-MCNC: 0.65 MG/DL (ref 0.6–1.3)
EOSINOPHIL # BLD AUTO: 0.14 THOUSAND/ΜL (ref 0–0.61)
EOSINOPHIL NFR BLD AUTO: 4 % (ref 0–6)
ERYTHROCYTE [DISTWIDTH] IN BLOOD BY AUTOMATED COUNT: 12.6 % (ref 11.6–15.1)
EST. AVERAGE GLUCOSE BLD GHB EST-MCNC: 134 MG/DL
GFR SERPL CREATININE-BSD FRML MDRD: 96 ML/MIN/1.73SQ M
GLUCOSE P FAST SERPL-MCNC: 136 MG/DL (ref 65–99)
HBA1C MFR BLD: 6.3 %
HCT VFR BLD AUTO: 44.4 % (ref 34.8–46.1)
HGB BLD-MCNC: 14.3 G/DL (ref 11.5–15.4)
IMM GRANULOCYTES # BLD AUTO: 0.01 THOUSAND/UL (ref 0–0.2)
IMM GRANULOCYTES NFR BLD AUTO: 0 % (ref 0–2)
INR PPP: 1.06 (ref 0.84–1.19)
LYMPHOCYTES # BLD AUTO: 1.38 THOUSANDS/ΜL (ref 0.6–4.47)
LYMPHOCYTES NFR BLD AUTO: 35 % (ref 14–44)
MCH RBC QN AUTO: 31 PG (ref 26.8–34.3)
MCHC RBC AUTO-ENTMCNC: 32.2 G/DL (ref 31.4–37.4)
MCV RBC AUTO: 96 FL (ref 82–98)
MONOCYTES # BLD AUTO: 0.23 THOUSAND/ΜL (ref 0.17–1.22)
MONOCYTES NFR BLD AUTO: 6 % (ref 4–12)
NEUTROPHILS # BLD AUTO: 2.19 THOUSANDS/ΜL (ref 1.85–7.62)
NEUTS SEG NFR BLD AUTO: 54 % (ref 43–75)
NRBC BLD AUTO-RTO: 0 /100 WBCS
PLATELET # BLD AUTO: 134 THOUSANDS/UL (ref 149–390)
PMV BLD AUTO: 11 FL (ref 8.9–12.7)
POTASSIUM SERPL-SCNC: 5.3 MMOL/L (ref 3.5–5.3)
PROT SERPL-MCNC: 7.2 G/DL (ref 6.4–8.2)
PROTHROMBIN TIME: 13.4 SECONDS (ref 11.6–14.5)
RBC # BLD AUTO: 4.62 MILLION/UL (ref 3.81–5.12)
SODIUM SERPL-SCNC: 136 MMOL/L (ref 136–145)
WBC # BLD AUTO: 4 THOUSAND/UL (ref 4.31–10.16)

## 2020-06-11 PROCEDURE — 80053 COMPREHEN METABOLIC PANEL: CPT

## 2020-06-11 PROCEDURE — 36415 COLL VENOUS BLD VENIPUNCTURE: CPT

## 2020-06-11 PROCEDURE — 82107 ALPHA-FETOPROTEIN L3: CPT

## 2020-06-11 PROCEDURE — 83036 HEMOGLOBIN GLYCOSYLATED A1C: CPT

## 2020-06-11 PROCEDURE — 3044F HG A1C LEVEL LT 7.0%: CPT | Performed by: NURSE PRACTITIONER

## 2020-06-11 PROCEDURE — 85025 COMPLETE CBC W/AUTO DIFF WBC: CPT

## 2020-06-11 PROCEDURE — 85610 PROTHROMBIN TIME: CPT

## 2020-06-15 LAB
AFP L3 MFR SERPL: NORMAL % (ref 0–9.9)
AFP SERPL-MCNC: 3.8 NG/ML (ref 0–8)

## 2020-06-15 RX ORDER — CARVEDILOL 12.5 MG/1
TABLET ORAL
COMMUNITY
Start: 2020-05-27 | End: 2021-08-02 | Stop reason: SDUPTHER

## 2020-06-15 RX ORDER — FAMOTIDINE 40 MG/1
TABLET, FILM COATED ORAL
COMMUNITY
Start: 2020-05-27 | End: 2020-06-22 | Stop reason: ALTCHOICE

## 2020-06-22 ENCOUNTER — OFFICE VISIT (OUTPATIENT)
Dept: FAMILY MEDICINE CLINIC | Facility: CLINIC | Age: 60
End: 2020-06-22
Payer: COMMERCIAL

## 2020-06-22 VITALS
RESPIRATION RATE: 16 BRPM | DIASTOLIC BLOOD PRESSURE: 80 MMHG | SYSTOLIC BLOOD PRESSURE: 130 MMHG | HEART RATE: 74 BPM | HEIGHT: 64 IN | OXYGEN SATURATION: 97 % | WEIGHT: 261 LBS | TEMPERATURE: 99.5 F | BODY MASS INDEX: 44.56 KG/M2

## 2020-06-22 DIAGNOSIS — K74.60 HEPATIC CIRRHOSIS, UNSPECIFIED HEPATIC CIRRHOSIS TYPE, UNSPECIFIED WHETHER ASCITES PRESENT (HCC): ICD-10-CM

## 2020-06-22 DIAGNOSIS — E78.2 MIXED HYPERLIPIDEMIA: ICD-10-CM

## 2020-06-22 DIAGNOSIS — F41.9 ANXIETY: ICD-10-CM

## 2020-06-22 DIAGNOSIS — I10 ESSENTIAL HYPERTENSION: ICD-10-CM

## 2020-06-22 DIAGNOSIS — E11.9 TYPE 2 DIABETES MELLITUS WITHOUT COMPLICATION, WITHOUT LONG-TERM CURRENT USE OF INSULIN (HCC): Primary | ICD-10-CM

## 2020-06-22 DIAGNOSIS — D72.819 LEUKOPENIA, UNSPECIFIED TYPE: ICD-10-CM

## 2020-06-22 PROCEDURE — 3079F DIAST BP 80-89 MM HG: CPT | Performed by: NURSE PRACTITIONER

## 2020-06-22 PROCEDURE — 3008F BODY MASS INDEX DOCD: CPT | Performed by: NURSE PRACTITIONER

## 2020-06-22 PROCEDURE — 3044F HG A1C LEVEL LT 7.0%: CPT | Performed by: NURSE PRACTITIONER

## 2020-06-22 PROCEDURE — 4004F PT TOBACCO SCREEN RCVD TLK: CPT | Performed by: NURSE PRACTITIONER

## 2020-06-22 PROCEDURE — 99214 OFFICE O/P EST MOD 30 MIN: CPT | Performed by: NURSE PRACTITIONER

## 2020-06-22 PROCEDURE — 3075F SYST BP GE 130 - 139MM HG: CPT | Performed by: NURSE PRACTITIONER

## 2020-06-22 PROCEDURE — 2022F DILAT RTA XM EVC RTNOPTHY: CPT | Performed by: NURSE PRACTITIONER

## 2020-06-23 DIAGNOSIS — E11.9 TYPE 2 DIABETES MELLITUS WITHOUT COMPLICATION, WITHOUT LONG-TERM CURRENT USE OF INSULIN (HCC): Primary | ICD-10-CM

## 2020-06-24 RX ORDER — BLOOD SUGAR DIAGNOSTIC
STRIP MISCELLANEOUS
Qty: 100 EACH | Refills: 1 | Status: SHIPPED | OUTPATIENT
Start: 2020-06-24 | End: 2020-12-23

## 2020-06-29 ENCOUNTER — TRANSCRIBE ORDERS (OUTPATIENT)
Dept: ADMINISTRATIVE | Facility: HOSPITAL | Age: 60
End: 2020-06-29

## 2020-06-29 DIAGNOSIS — K74.60 HEPATIC CIRRHOSIS, UNSPECIFIED HEPATIC CIRRHOSIS TYPE, UNSPECIFIED WHETHER ASCITES PRESENT (HCC): Primary | ICD-10-CM

## 2020-07-08 ENCOUNTER — HOSPITAL ENCOUNTER (OUTPATIENT)
Dept: ULTRASOUND IMAGING | Facility: HOSPITAL | Age: 60
Discharge: HOME | End: 2020-07-08
Attending: INTERNAL MEDICINE
Payer: COMMERCIAL

## 2020-07-08 DIAGNOSIS — I10 ESSENTIAL HYPERTENSION: ICD-10-CM

## 2020-07-08 DIAGNOSIS — K74.60 HEPATIC CIRRHOSIS, UNSPECIFIED HEPATIC CIRRHOSIS TYPE, UNSPECIFIED WHETHER ASCITES PRESENT (HCC): ICD-10-CM

## 2020-07-08 PROCEDURE — 76705 ECHO EXAM OF ABDOMEN: CPT

## 2020-07-08 RX ORDER — LISINOPRIL AND HYDROCHLOROTHIAZIDE 12.5; 1 MG/1; MG/1
TABLET ORAL
Qty: 90 TABLET | Refills: 1 | Status: SHIPPED | OUTPATIENT
Start: 2020-07-08 | End: 2021-01-11 | Stop reason: SDUPTHER

## 2020-07-28 ENCOUNTER — APPOINTMENT (OUTPATIENT)
Dept: LAB | Facility: CLINIC | Age: 60
End: 2020-07-28
Payer: COMMERCIAL

## 2020-07-28 DIAGNOSIS — D72.819 LEUKOPENIA, UNSPECIFIED TYPE: Primary | ICD-10-CM

## 2020-07-28 DIAGNOSIS — D72.819 LEUKOPENIA, UNSPECIFIED TYPE: ICD-10-CM

## 2020-07-28 DIAGNOSIS — E11.9 TYPE 2 DIABETES MELLITUS WITHOUT COMPLICATION, WITHOUT LONG-TERM CURRENT USE OF INSULIN (HCC): ICD-10-CM

## 2020-07-28 LAB
ALBUMIN SERPL BCP-MCNC: 3.5 G/DL (ref 3.5–5)
ALP SERPL-CCNC: 78 U/L (ref 46–116)
ALT SERPL W P-5'-P-CCNC: 26 U/L (ref 12–78)
ANION GAP SERPL CALCULATED.3IONS-SCNC: 5 MMOL/L (ref 4–13)
AST SERPL W P-5'-P-CCNC: 20 U/L (ref 5–45)
BASOPHILS # BLD AUTO: 0.06 THOUSANDS/ΜL (ref 0–0.1)
BASOPHILS NFR BLD AUTO: 2 % (ref 0–1)
BILIRUB SERPL-MCNC: 0.67 MG/DL (ref 0.2–1)
BUN SERPL-MCNC: 13 MG/DL (ref 5–25)
CALCIUM SERPL-MCNC: 9.4 MG/DL (ref 8.3–10.1)
CHLORIDE SERPL-SCNC: 107 MMOL/L (ref 100–108)
CO2 SERPL-SCNC: 29 MMOL/L (ref 21–32)
CREAT SERPL-MCNC: 0.68 MG/DL (ref 0.6–1.3)
EOSINOPHIL # BLD AUTO: 0.22 THOUSAND/ΜL (ref 0–0.61)
EOSINOPHIL NFR BLD AUTO: 6 % (ref 0–6)
ERYTHROCYTE [DISTWIDTH] IN BLOOD BY AUTOMATED COUNT: 12.9 % (ref 11.6–15.1)
GFR SERPL CREATININE-BSD FRML MDRD: 95 ML/MIN/1.73SQ M
GLUCOSE P FAST SERPL-MCNC: 99 MG/DL (ref 65–99)
HCT VFR BLD AUTO: 44.7 % (ref 34.8–46.1)
HGB BLD-MCNC: 14.2 G/DL (ref 11.5–15.4)
IMM GRANULOCYTES # BLD AUTO: 0.01 THOUSAND/UL (ref 0–0.2)
IMM GRANULOCYTES NFR BLD AUTO: 0 % (ref 0–2)
LYMPHOCYTES # BLD AUTO: 1.29 THOUSANDS/ΜL (ref 0.6–4.47)
LYMPHOCYTES NFR BLD AUTO: 32 % (ref 14–44)
MCH RBC QN AUTO: 30.8 PG (ref 26.8–34.3)
MCHC RBC AUTO-ENTMCNC: 31.8 G/DL (ref 31.4–37.4)
MCV RBC AUTO: 97 FL (ref 82–98)
MONOCYTES # BLD AUTO: 0.28 THOUSAND/ΜL (ref 0.17–1.22)
MONOCYTES NFR BLD AUTO: 7 % (ref 4–12)
NEUTROPHILS # BLD AUTO: 2.12 THOUSANDS/ΜL (ref 1.85–7.62)
NEUTS SEG NFR BLD AUTO: 53 % (ref 43–75)
NRBC BLD AUTO-RTO: 0 /100 WBCS
PLATELET # BLD AUTO: 146 THOUSANDS/UL (ref 149–390)
PMV BLD AUTO: 10.9 FL (ref 8.9–12.7)
POTASSIUM SERPL-SCNC: 4.8 MMOL/L (ref 3.5–5.3)
PROT SERPL-MCNC: 6.9 G/DL (ref 6.4–8.2)
RBC # BLD AUTO: 4.61 MILLION/UL (ref 3.81–5.12)
SODIUM SERPL-SCNC: 141 MMOL/L (ref 136–145)
WBC # BLD AUTO: 3.98 THOUSAND/UL (ref 4.31–10.16)

## 2020-07-28 PROCEDURE — 80053 COMPREHEN METABOLIC PANEL: CPT

## 2020-07-28 PROCEDURE — 85025 COMPLETE CBC W/AUTO DIFF WBC: CPT

## 2020-07-28 PROCEDURE — 36415 COLL VENOUS BLD VENIPUNCTURE: CPT

## 2020-07-29 ENCOUNTER — TELEPHONE (OUTPATIENT)
Dept: SURGICAL ONCOLOGY | Facility: CLINIC | Age: 60
End: 2020-07-29

## 2020-07-29 NOTE — TELEPHONE ENCOUNTER
New Patient Encounter    New Patient Intake Form   Patient Details:  Stephanie Yeh  1960  [id number]    Background Information:  [address] starts by opening a telephone encounter and gathering the following information   Who is calling to schedule? If not self, relationship to patient? self   Referring Provider Judith Kruse   What is the diagnosis? leukopenia   Is this diagnosis confirmed? Yes   When was the diagnosis? 7/2020   Is there a confirmed diagnosis from a biopsy/tissue reviewed by pathology? Is patient aware of diagnosis? Yes   Is there a personal history and what kind? Is there a family history and what kind? Reason for visit? New Diagnosis   Have you had any imaging or labs done? If so: when, where? yes     Are records in EPIC? yes   Was the patient told to bring a disk? no   Does the patient smoke or Vape? no   If yes, how many packs or cartridges per day? Scheduling Information:   [facility]: [facility]     Are there any dates/time the patient cannot be seen? Miscellaneous:    After completing the above information, please route to Financial Counselor and the appropriate Nurse Navigator for review

## 2020-08-03 DIAGNOSIS — E78.2 MIXED HYPERLIPIDEMIA: ICD-10-CM

## 2020-08-03 DIAGNOSIS — R73.03 PREDIABETES: ICD-10-CM

## 2020-08-04 RX ORDER — ATORVASTATIN CALCIUM 20 MG/1
TABLET, FILM COATED ORAL
Qty: 90 TABLET | Refills: 1 | Status: SHIPPED | OUTPATIENT
Start: 2020-08-04 | End: 2021-01-11 | Stop reason: SDUPTHER

## 2020-09-17 DIAGNOSIS — F41.9 ANXIETY: ICD-10-CM

## 2020-09-17 RX ORDER — ALPRAZOLAM 0.25 MG/1
0.25 TABLET ORAL
Qty: 30 TABLET | Refills: 0 | Status: SHIPPED | OUTPATIENT
Start: 2020-09-17 | End: 2020-11-30 | Stop reason: SDUPTHER

## 2020-10-12 ENCOUNTER — CONSULT (OUTPATIENT)
Dept: HEMATOLOGY ONCOLOGY | Facility: CLINIC | Age: 60
End: 2020-10-12
Payer: COMMERCIAL

## 2020-10-12 VITALS
WEIGHT: 266 LBS | TEMPERATURE: 98 F | SYSTOLIC BLOOD PRESSURE: 130 MMHG | HEART RATE: 63 BPM | BODY MASS INDEX: 45.41 KG/M2 | RESPIRATION RATE: 17 BRPM | HEIGHT: 64 IN | OXYGEN SATURATION: 100 % | DIASTOLIC BLOOD PRESSURE: 85 MMHG

## 2020-10-12 DIAGNOSIS — D72.819 LEUKOPENIA, UNSPECIFIED TYPE: ICD-10-CM

## 2020-10-12 PROCEDURE — 99243 OFF/OP CNSLTJ NEW/EST LOW 30: CPT | Performed by: PHYSICIAN ASSISTANT

## 2020-10-12 PROCEDURE — 3079F DIAST BP 80-89 MM HG: CPT | Performed by: PHYSICIAN ASSISTANT

## 2020-10-12 PROCEDURE — 4004F PT TOBACCO SCREEN RCVD TLK: CPT | Performed by: PHYSICIAN ASSISTANT

## 2020-11-30 DIAGNOSIS — F41.9 ANXIETY: ICD-10-CM

## 2020-11-30 RX ORDER — ALPRAZOLAM 0.25 MG/1
0.25 TABLET ORAL
Qty: 30 TABLET | Refills: 0 | Status: SHIPPED | OUTPATIENT
Start: 2020-11-30 | End: 2021-03-02 | Stop reason: SDUPTHER

## 2020-12-21 PROBLEM — D69.6 THROMBOCYTOPENIA, UNSPECIFIED (HCC): Status: ACTIVE | Noted: 2020-12-21

## 2020-12-23 DIAGNOSIS — E11.9 TYPE 2 DIABETES MELLITUS WITHOUT COMPLICATION, WITHOUT LONG-TERM CURRENT USE OF INSULIN (HCC): ICD-10-CM

## 2020-12-23 RX ORDER — BLOOD SUGAR DIAGNOSTIC
STRIP MISCELLANEOUS
Qty: 100 EACH | Refills: 1 | Status: SHIPPED | OUTPATIENT
Start: 2020-12-23 | End: 2021-06-21

## 2020-12-29 ENCOUNTER — TELEPHONE (OUTPATIENT)
Dept: HEMATOLOGY ONCOLOGY | Facility: CLINIC | Age: 60
End: 2020-12-29

## 2020-12-30 ENCOUNTER — TELEPHONE (OUTPATIENT)
Dept: HEMATOLOGY ONCOLOGY | Facility: CLINIC | Age: 60
End: 2020-12-30

## 2020-12-30 ENCOUNTER — LAB (OUTPATIENT)
Dept: LAB | Facility: CLINIC | Age: 60
End: 2020-12-30
Payer: COMMERCIAL

## 2020-12-30 ENCOUNTER — TRANSCRIBE ORDERS (OUTPATIENT)
Dept: LAB | Facility: CLINIC | Age: 60
End: 2020-12-30

## 2020-12-30 DIAGNOSIS — I10 ESSENTIAL HYPERTENSION: ICD-10-CM

## 2020-12-30 DIAGNOSIS — K74.60 HEPATIC CIRRHOSIS, UNSPECIFIED HEPATIC CIRRHOSIS TYPE, UNSPECIFIED WHETHER ASCITES PRESENT (HCC): ICD-10-CM

## 2020-12-30 DIAGNOSIS — E78.2 MIXED HYPERLIPIDEMIA: ICD-10-CM

## 2020-12-30 DIAGNOSIS — E11.9 TYPE 2 DIABETES MELLITUS WITHOUT COMPLICATION, WITHOUT LONG-TERM CURRENT USE OF INSULIN (HCC): ICD-10-CM

## 2020-12-30 DIAGNOSIS — F41.9 ANXIETY: ICD-10-CM

## 2020-12-30 DIAGNOSIS — D72.819 LEUKOPENIA, UNSPECIFIED TYPE: Primary | ICD-10-CM

## 2020-12-30 DIAGNOSIS — D72.819 LEUKOPENIA, UNSPECIFIED TYPE: ICD-10-CM

## 2020-12-30 LAB
ALBUMIN SERPL BCP-MCNC: 4 G/DL (ref 3.5–5)
ALP SERPL-CCNC: 96 U/L (ref 46–116)
ALT SERPL W P-5'-P-CCNC: 25 U/L (ref 12–78)
ANION GAP SERPL CALCULATED.3IONS-SCNC: 5 MMOL/L (ref 4–13)
AST SERPL W P-5'-P-CCNC: 23 U/L (ref 5–45)
BASOPHILS # BLD AUTO: 0.06 THOUSANDS/ΜL (ref 0–0.1)
BASOPHILS NFR BLD AUTO: 1 % (ref 0–1)
BILIRUB SERPL-MCNC: 0.81 MG/DL (ref 0.2–1)
BUN SERPL-MCNC: 8 MG/DL (ref 5–25)
CALCIUM SERPL-MCNC: 9.9 MG/DL (ref 8.3–10.1)
CHLORIDE SERPL-SCNC: 103 MMOL/L (ref 100–108)
CO2 SERPL-SCNC: 29 MMOL/L (ref 21–32)
CREAT SERPL-MCNC: 0.59 MG/DL (ref 0.6–1.3)
CREAT UR-MCNC: 18.2 MG/DL
CRP SERPL QL: <3 MG/L
EOSINOPHIL # BLD AUTO: 0.09 THOUSAND/ΜL (ref 0–0.61)
EOSINOPHIL NFR BLD AUTO: 2 % (ref 0–6)
ERYTHROCYTE [DISTWIDTH] IN BLOOD BY AUTOMATED COUNT: 12.8 % (ref 11.6–15.1)
EST. AVERAGE GLUCOSE BLD GHB EST-MCNC: 126 MG/DL
FERRITIN SERPL-MCNC: 163 NG/ML (ref 8–388)
GFR SERPL CREATININE-BSD FRML MDRD: 99 ML/MIN/1.73SQ M
GLUCOSE SERPL-MCNC: 103 MG/DL (ref 65–140)
HBA1C MFR BLD: 6 %
HCT VFR BLD AUTO: 47 % (ref 34.8–46.1)
HGB BLD-MCNC: 15.3 G/DL (ref 11.5–15.4)
IMM GRANULOCYTES # BLD AUTO: 0 THOUSAND/UL (ref 0–0.2)
IMM GRANULOCYTES NFR BLD AUTO: 0 % (ref 0–2)
IRON SATN MFR SERPL: 38 %
IRON SERPL-MCNC: 122 UG/DL (ref 50–170)
LDH SERPL-CCNC: 176 U/L (ref 81–234)
LYMPHOCYTES # BLD AUTO: 1.41 THOUSANDS/ΜL (ref 0.6–4.47)
LYMPHOCYTES NFR BLD AUTO: 32 % (ref 14–44)
MCH RBC QN AUTO: 31 PG (ref 26.8–34.3)
MCHC RBC AUTO-ENTMCNC: 32.6 G/DL (ref 31.4–37.4)
MCV RBC AUTO: 95 FL (ref 82–98)
MICROALBUMIN UR-MCNC: <5 MG/L (ref 0–20)
MICROALBUMIN/CREAT 24H UR: <27 MG/G CREATININE (ref 0–30)
MONOCYTES # BLD AUTO: 0.25 THOUSAND/ΜL (ref 0.17–1.22)
MONOCYTES NFR BLD AUTO: 6 % (ref 4–12)
NEUTROPHILS # BLD AUTO: 2.54 THOUSANDS/ΜL (ref 1.85–7.62)
NEUTS SEG NFR BLD AUTO: 59 % (ref 43–75)
NRBC BLD AUTO-RTO: 0 /100 WBCS
PLATELET # BLD AUTO: 121 THOUSANDS/UL (ref 149–390)
PMV BLD AUTO: 11 FL (ref 8.9–12.7)
POTASSIUM SERPL-SCNC: 4 MMOL/L (ref 3.5–5.3)
PROT SERPL-MCNC: 7.2 G/DL (ref 6.4–8.2)
RBC # BLD AUTO: 4.94 MILLION/UL (ref 3.81–5.12)
SODIUM SERPL-SCNC: 137 MMOL/L (ref 136–145)
TIBC SERPL-MCNC: 319 UG/DL (ref 250–450)
TSH SERPL DL<=0.05 MIU/L-ACNC: 3.07 UIU/ML (ref 0.36–3.74)
VIT B12 SERPL-MCNC: 369 PG/ML (ref 100–900)
WBC # BLD AUTO: 4.35 THOUSAND/UL (ref 4.31–10.16)

## 2020-12-30 PROCEDURE — 83540 ASSAY OF IRON: CPT

## 2020-12-30 PROCEDURE — 82570 ASSAY OF URINE CREATININE: CPT

## 2020-12-30 PROCEDURE — 82728 ASSAY OF FERRITIN: CPT

## 2020-12-30 PROCEDURE — 83615 LACTATE (LD) (LDH) ENZYME: CPT

## 2020-12-30 PROCEDURE — 86430 RHEUMATOID FACTOR TEST QUAL: CPT

## 2020-12-30 PROCEDURE — 82043 UR ALBUMIN QUANTITATIVE: CPT

## 2020-12-30 PROCEDURE — 86038 ANTINUCLEAR ANTIBODIES: CPT

## 2020-12-30 PROCEDURE — 80053 COMPREHEN METABOLIC PANEL: CPT

## 2020-12-30 PROCEDURE — 86140 C-REACTIVE PROTEIN: CPT

## 2020-12-30 PROCEDURE — 82607 VITAMIN B-12: CPT

## 2020-12-30 PROCEDURE — 83550 IRON BINDING TEST: CPT

## 2020-12-30 PROCEDURE — 86431 RHEUMATOID FACTOR QUANT: CPT

## 2020-12-30 PROCEDURE — 84443 ASSAY THYROID STIM HORMONE: CPT

## 2020-12-30 PROCEDURE — 36415 COLL VENOUS BLD VENIPUNCTURE: CPT | Performed by: PHYSICIAN ASSISTANT

## 2020-12-30 PROCEDURE — 83036 HEMOGLOBIN GLYCOSYLATED A1C: CPT

## 2020-12-30 PROCEDURE — 85025 COMPLETE CBC W/AUTO DIFF WBC: CPT | Performed by: PHYSICIAN ASSISTANT

## 2020-12-31 LAB
CRYOGLOB RF SER-ACNC: ABNORMAL [IU]/ML
RHEUMATOID FACT SER QL LA: POSITIVE

## 2021-01-01 LAB — RYE IGE QN: NEGATIVE

## 2021-01-11 ENCOUNTER — TELEMEDICINE (OUTPATIENT)
Dept: FAMILY MEDICINE CLINIC | Facility: CLINIC | Age: 61
End: 2021-01-11
Payer: COMMERCIAL

## 2021-01-11 VITALS
WEIGHT: 259 LBS | BODY MASS INDEX: 44.22 KG/M2 | SYSTOLIC BLOOD PRESSURE: 130 MMHG | DIASTOLIC BLOOD PRESSURE: 76 MMHG | HEIGHT: 64 IN

## 2021-01-11 DIAGNOSIS — E78.2 MIXED HYPERLIPIDEMIA: ICD-10-CM

## 2021-01-11 DIAGNOSIS — F41.9 ANXIETY: ICD-10-CM

## 2021-01-11 DIAGNOSIS — D72.819 LEUKOPENIA, UNSPECIFIED TYPE: ICD-10-CM

## 2021-01-11 DIAGNOSIS — I10 ESSENTIAL HYPERTENSION: Primary | ICD-10-CM

## 2021-01-11 DIAGNOSIS — K74.69 OTHER CIRRHOSIS OF LIVER (HCC): ICD-10-CM

## 2021-01-11 DIAGNOSIS — E11.9 TYPE 2 DIABETES MELLITUS WITHOUT COMPLICATION, WITHOUT LONG-TERM CURRENT USE OF INSULIN (HCC): ICD-10-CM

## 2021-01-11 DIAGNOSIS — D69.6 THROMBOCYTOPENIA, UNSPECIFIED (HCC): ICD-10-CM

## 2021-01-11 PROCEDURE — 99214 OFFICE O/P EST MOD 30 MIN: CPT | Performed by: NURSE PRACTITIONER

## 2021-01-11 RX ORDER — ATORVASTATIN CALCIUM 20 MG/1
20 TABLET, FILM COATED ORAL DAILY
Qty: 90 TABLET | Refills: 1 | Status: SHIPPED | OUTPATIENT
Start: 2021-01-11 | End: 2021-07-30

## 2021-01-11 RX ORDER — LISINOPRIL AND HYDROCHLOROTHIAZIDE 12.5; 1 MG/1; MG/1
1 TABLET ORAL DAILY
Qty: 90 TABLET | Refills: 1 | Status: SHIPPED | OUTPATIENT
Start: 2021-01-11 | End: 2021-07-30

## 2021-01-11 NOTE — PROGRESS NOTES
Virtual Regular Visit      Assessment/Plan:    Problem List Items Addressed This Visit        Digestive    Other cirrhosis of liver ([facility] )  Continue to follow-up with GI  Endocrine    Type 2 diabetes mellitus without complication, without long-term current use of insulin  Hemoglobin A1C 6 0  Continue metformin  GI is aware that patient is taking metformin for DM 2  Cardiovascular and Mediastinum    Essential hypertension - Primary  Continue coreg and lisinopril-HCTZ  Other    Mixed hyperlipidemia  Patient instructed to get her lipid panel done  Low fat diet reviewed  Continue atorvastatin  Anxiety  Continue xanax prn  Leukopenia  Continue to follow-up with hematology  Thrombocytopenia, unspecified ([facility] )  Continue to follow-up with hematology  Reviewed lab results with the patient  Patient instructed to continue to follow-up with her specialists  Patient instructed to follow-up in 3 months or sooner prn  BMI Counseling: Body mass index is 44 46 kg/m²  The BMI is above normal  Nutrition recommendations include encouraging healthy choices of fruits and vegetables, reducing intake of saturated and trans fat and reducing intake of cholesterol  Exercise recommendations include exercising 3-5 times per week  Tobacco Cessation Counseling: Tobacco cessation counseling was provided  The patient is sincerely urged to quit consumption of tobacco  She is not ready to quit tobacco        Reason for visit is   Chief Complaint   Patient presents with    Follow-up    Virtual Regular Visit        Encounter provider SHAWNA Manuel    Provider located at [address] [place] [id number]      Recent Visits  No visits were found meeting these conditions     Showing recent visits within past 7 days and meeting all other requirements     Today's Visits  Date Type Provider Dept   01/11/21 Telemedicine [facility] SHAWNA Singh Pg   Showing today's visits and meeting all other requirements     Future Appointments  No visits were found meeting these conditions  Showing future appointments within next 150 days and meeting all other requirements        The patient was identified by name and date of birth  Shazia León was informed that this is a telemedicine visit and that the visit is being conducted through [facility] and patient was informed that this is a secure, HIPAA-compliant platform  She agrees to proceed     My office door was closed  No one else was in the room  She acknowledged consent and understanding of privacy and security of the video platform  The patient has agreed to participate and understands they can discontinue the visit at any time  Patient is aware this is a billable service  Subjective  Shazia León is a 56 y o  female    Patient is here for a follow-up for chronic medical conditions  Patient is here for a follow-up for anxiety  Patient reports that she takes xanax prn and it helps  Patient refuses daily medication for anxiety  Denies any depression or suicidal thoughts  Patient is here for a follow-up for HTN  Patient reports that she takes lisinopril-HCTZ and Coreg daily  Denies any chest pain, SOB, palpitations, dizziness, or Has  Patient is here for a follow-up for hyperlipidemia  Patient reports that she takes the atorvastatin as prescribed  Patient reports that she needs to get her lipid panel done  Patient is here for a follow-up for diabetes 2  Patient reports that she takes metformin twice a day  Denies any Has, dizziness, nausea, or vomiting  Patient follows up with hematology for leukopenia and thrombocytopenia  Patient follows up with GI for liver cirrhosis  Patient reports that her GI specialist is aware that she takes metformin  Denies any abdominal pain, blood in the stool, nausea, or vomiting          History reviewed  No pertinent past medical history  Past Surgical History:   Procedure Laterality Date    ACHILLES TENDON REPAIR Right     HYSTERECTOMY         Current Outpatient Medications   Medication Sig Dispense Refill    Accu-Chek Guide test strip USE TO CHECK BLOOD SUGAR ONCE DAILY AS DIRECTED 100 each 1    ALPRAZolam (XANAX) 0 25 mg tablet Take 1 tablet (0 25 mg total) by mouth daily at bedtime as needed for anxiety 30 tablet 0    atorvastatin (LIPITOR) 20 mg tablet TAKE ONE TABLET BY MOUTH EVERY DAY 90 tablet 1    carvedilol (COREG) 12 5 mg tablet       lisinopril-hydrochlorothiazide (PRINZIDE,ZESTORETIC) 10-12 5 MG per tablet TAKE ONE TABLET BY MOUTH EVERY DAY 90 tablet 1    metFORMIN (GLUCOPHAGE) 500 mg tablet TAKE ONE TABLET BY MOUTH TWICE A DAY WITH MEALS 180 tablet 1     No current facility-administered medications for this visit  Allergies   Allergen Reactions    Penicillins        Review of Systems   Constitutional: Negative for chills, fatigue and fever  HENT: Negative for congestion, ear pain and sore throat  Respiratory: Negative for cough, chest tightness, shortness of breath and wheezing  Cardiovascular: Negative for chest pain, palpitations and leg swelling  Gastrointestinal: Negative for abdominal pain, blood in stool, diarrhea, nausea and vomiting  Skin: Negative for rash  Neurological: Negative for dizziness, seizures, syncope, light-headedness and headaches  Psychiatric/Behavioral: Negative for suicidal ideas  As noted in HPI  Video Exam    Vitals:    01/11/21 1754   BP: 130/76   BP Location: Right arm   Patient Position: Sitting   Weight: 117 kg (259 lb)   Height: 5' 4" (1 626 m)       Physical Exam  Constitutional:       General: She is not in acute distress  Appearance: She is not ill-appearing or diaphoretic     HENT:      Right Ear: External ear normal       Left Ear: External ear normal    Pulmonary:      Effort: Pulmonary effort is normal  No respiratory distress  Neurological:      Mental Status: She is alert and oriented to person, place, and time  Psychiatric:         Mood and Affect: Mood normal           I spent 15 minutes with patient today in which greater than 50% of the time was spent in counseling/coordination of care regarding lab results and importance of following up with specialists  VIRTUAL VISIT DISCLAIMER    Mirza Muñiz acknowledges that she has consented to an online visit or consultation  She understands that the online visit is based solely on information provided by her, and that, in the absence of a face-to-face physical evaluation by the physician, the diagnosis she receives is both limited and provisional in terms of accuracy and completeness  This is not intended to replace a full medical face-to-face evaluation by the physician  Mirza Muñiz understands and accepts these terms

## 2021-01-13 ENCOUNTER — OFFICE VISIT (OUTPATIENT)
Dept: GASTROENTEROLOGY | Facility: CLINIC | Age: 61
End: 2021-01-13
Payer: COMMERCIAL

## 2021-01-13 VITALS
WEIGHT: 266 LBS | SYSTOLIC BLOOD PRESSURE: 139 MMHG | HEIGHT: 64 IN | BODY MASS INDEX: 45.41 KG/M2 | DIASTOLIC BLOOD PRESSURE: 97 MMHG | HEART RATE: 70 BPM

## 2021-01-13 DIAGNOSIS — Z12.11 COLON CANCER SCREENING: ICD-10-CM

## 2021-01-13 DIAGNOSIS — D69.6 THROMBOCYTOPENIA, UNSPECIFIED (HCC): ICD-10-CM

## 2021-01-13 DIAGNOSIS — K21.00 GASTROESOPHAGEAL REFLUX DISEASE WITH ESOPHAGITIS WITHOUT HEMORRHAGE: ICD-10-CM

## 2021-01-13 DIAGNOSIS — K57.90 DIVERTICULOSIS: ICD-10-CM

## 2021-01-13 DIAGNOSIS — K70.30 ALCOHOLIC CIRRHOSIS OF LIVER WITHOUT ASCITES (HCC): Primary | ICD-10-CM

## 2021-01-13 PROCEDURE — 99213 OFFICE O/P EST LOW 20 MIN: CPT | Performed by: INTERNAL MEDICINE

## 2021-01-13 NOTE — PROGRESS NOTES
[address] [facility] - Outpatient Follow-up Note  Nevaeh Dodd 56 y o  female MRN: [id number]  Encounter: [id number]          ASSESSMENT AND PLAN:      1  Alcoholic cirrhosis of liver without ascites (HCC)  -     AFP tumor marker; Standing  -     Comprehensive metabolic panel; Standing  -     CBC; Standing  -     Protime-INR; Standing  -     US right upper quadrant; Future; Expected date: 01/13/2021  -     AFP tumor marker  -     Comprehensive metabolic panel  -     CBC  -     Protime-INR  -MELD 7  Stable and well compensated  No evidence of ascites or hepatic encephalopathy  No evidence of varices on last EGD  Continue Carvedilol 12 5mg BID for pre primary prophylaxis, BP & HR is stable  Pt is due for EGD for variceal surveillance but would like to defer this until after she can get the COVID-19 vaccine  Will follow up in 4 months to go over labs & imaging and schedule EGD  2  Thrombocytopenia, unspecified ([address] )  -Likely secondary to #1    -Monitor  Pt denies any excessive bleeding or bruising   -Pt follows with hematology    3  Gastroesophageal reflux disease with esophagitis without hemorrhage  -Stable  Stopped taking Pepcid and is only having reflux/heartburn symptoms about once a month    -Last EGD 1/27/20 by Dr Becky Berumen showed LA grade B esophagitis  2cm hiatal hernia  No esophageal varices  Mild diffuse antral erythema  Normal duodenum  Biopsy negative for H Pylori  -Take Pepcid as needed  4  Colon cancer screening    5  Diverticulosis    Last colonoscopy in 2017 by Dr Maeve Hearn showed pandiverticulosis, no polyps  Maintain high fiber diet  She has a history of diminutive nonneoplastic polyps on the colonoscopy previous to that one  Next colonoscopy due in 2027       ______________________________________________________________________    SUBJECTIVE:  She has been doing well since last visit  She has not had alcohol in over 20 years   Denies abdominal bloating or pain, shortness of breath, excessive bleeding, or bruising  Denies any confusion or memory loss  Has heartburn/reflux only once per month, is no longer on any medication  No nausea/vomiting, weight loss, black or bloody stools   has renal cell cancer and is currently hospitalized  She would like to defer yearly EGD for variceal screening until after she has a chance to get covid-19 vaccine as she is uncomfortable having anyone else drive her and her  isn't able to  REVIEW OF SYSTEMS IS OTHERWISE NEGATIVE  Historical Information   History reviewed  No pertinent past medical history  Past Surgical History:   Procedure Laterality Date    ACHILLES TENDON REPAIR Right     HYSTERECTOMY       Social History   Social History     Substance and Sexual Activity   Alcohol Use No    Comment: Sober for 20 years     Social History     Substance and Sexual Activity   Drug Use No     Social History     Tobacco Use   Smoking Status Current Every Day Smoker    Packs/day: 0 10    Years: 40 00    Pack years: 4 00    Types: Cigarettes   Smokeless Tobacco Never Used     Family History   Problem Relation Age of Onset    Lung cancer Sister     Hypertension Mother     No Known Problems Father     No Known Problems Maternal Grandmother     No Known Problems Maternal Grandfather     No Known Problems Paternal Grandmother     No Known Problems Paternal Grandfather        Meds/Allergies       Current Outpatient Medications:     Accu-Chek Guide test strip    ALPRAZolam (XANAX) 0 25 mg tablet    atorvastatin (LIPITOR) 20 mg tablet    carvedilol (COREG) 12 5 mg tablet    lisinopril-hydrochlorothiazide (PRINZIDE,ZESTORETIC) 10-12 5 MG per tablet    metFORMIN (GLUCOPHAGE) 500 mg tablet    Allergies   Allergen Reactions    Penicillins            Objective     Blood pressure 139/97, pulse 70, height 5' 4" (1 626 m), weight 121 kg (266 lb)  Body mass index is 45 66 kg/m²        PHYSICAL EXAM:      General Appearance:   Alert, cooperative, no distress   HEENT:   Normocephalic, atraumatic, anicteric  Neck:  Supple, symmetrical, trachea midline   Lungs:   Clear to auscultation bilaterally; no rales, rhonchi or wheezing; respirations unlabored    Heart[de-identified]   Regular rate and rhythm; no murmur  Abdomen:   Soft, non-tender, non-distended; normal bowel sounds; no masses, no organomegaly    Genitalia:   Deferred    Rectal:   Deferred    Extremities:  No cyanosis, clubbing or edema    Skin:  No jaundice, rashes, or lesions    Lymph nodes:  No palpable cervical lymphadenopathy        Lab Results:   No visits with results within 1 Day(s) from this visit  Latest known visit with results is:   Lab on 12/30/2020   Component Date Value    Sodium 12/30/2020 137     Potassium 12/30/2020 4 0     Chloride 12/30/2020 103     CO2 12/30/2020 29     ANION GAP 12/30/2020 5     BUN 12/30/2020 8     Creatinine 12/30/2020 0 59*    Glucose 12/30/2020 103     Calcium 12/30/2020 9 9     AST 12/30/2020 23     ALT 12/30/2020 25     Alkaline Phosphatase 12/30/2020 96     Total Protein 12/30/2020 7 2     Albumin 12/30/2020 4 0     Total Bilirubin 12/30/2020 0 81     eGFR 12/30/2020 99     TSH 3RD GENERATON 12/30/2020 3 070     Hemoglobin A1C 12/30/2020 6 0*    EAG 12/30/2020 126     Creatinine, Ur 12/30/2020 18 2     Microalbum  ,U,Random 12/30/2020 <5 0     Microalb Creat Ratio 12/30/2020 <27     CRP 12/30/2020 <3 0     ANTONIO 12/30/2020 Negative     LD 12/30/2020 176     Vitamin B-12 12/30/2020 369     Rheumatoid Factor 12/30/2020 Positive*    Iron Saturation 12/30/2020 38     TIBC 12/30/2020 319     Iron 12/30/2020 122     Ferritin 12/30/2020 163     RF Quantitation 12/30/2020 80 IU/mL*         Radiology Results:   No results found

## 2021-01-13 NOTE — PATIENT INSTRUCTIONS
Please obtain blood work and ultrasound of your liver  We will see you again in 4 months to schedule EGD and go over testing  Please call for any issues or concerns  Cirrhosis   AMBULATORY CARE:   Cirrhosis  is long-term scarring of the liver  The liver makes enzymes and bile that help digest food and gives your body energy  It also removes harmful material from your body, such as alcohol and other chemicals  Cirrhosis is caused by repeated damage to your liver over time  Scar tissue starts to replace healthy liver tissue  The scar tissue prevents the liver from working properly  Common signs and symptoms of cirrhosis:  You may not have any signs or symptoms until your liver damage is severe  You may have any of the following:  · Fatigue    · Bleeding and bruising easily    · Swelling of your feet, legs, or abdomen    · Nausea, loss of appetite, and weight loss    · Itching    · Jaundice (yellowing of your skin or eyes)    · Black bowel movements or dark urine    Seek care immediately if:   · You have pain during a bowel movement and it is black or contains blood  · You have a fast heart rate and fast breathing  · You are dizzy or confused  · You have severe pain in your abdomen  · You have trouble breathing  · Your vomit looks like it has coffee grinds or blood in it  Contact your healthcare provider if:   · You have a fever  · You have red or itchy skin  · You are in pain and feel weak  · You have questions or concerns about your condition or care  Treatment  may include any of the following:  · Medicines  may be used to treat high blood pressure in the portal vein (the vein that goes to your liver)  You may also need medicine to decrease extra fluid that collects in an area such as your legs or abdomen  Medicines may be used to decrease itching, or to treat a bacterial or viral infection       · Surgery  may be used to create a channel inside your liver to increase blood flow  This will help decrease swelling in your abdomen and lower blood pressure in the portal vein  Your risk for bleeding in your esophagus and stomach will also be decreased  You may need a liver transplant if your liver fails  Do not drink alcohol:  Alcohol will cause more damage to your liver  Manage cirrhosis:   · Do not smoke  Nicotine and other chemicals in cigarettes and cigars can cause blood vessel and lung damage  Ask your healthcare provider for information if you currently smoke and need help to quit  E-cigarettes or smokeless tobacco still contain nicotine  Talk to your healthcare provider before you use these products  · Eat a variety of healthy foods  Healthy foods include fruits, vegetables, whole-grain breads, low-fat dairy products, beans, lean meat, and fish  Ask if you need to be on a special diet  · Reach or maintain a healthy weight  You may develop fatty liver disease if you are overweight  Ask your healthcare provider for a healthy weight for you  He can help you create a safe weight loss plan if you are overweight  · Limit sodium (salt)  You may need to decrease the amount of sodium you eat if you have swelling caused by fluid buildup  Sodium is found in table salt and salty foods such as canned foods, frozen foods, and potato chips  · Drink liquids as directed  Ask how much liquid to drink each day and which liquids are best for you  For most people, good liquids to drink are water, juice, and milk  Liquids can help your liver work better  · Ask about vaccines  You may have a hard time fighting infection because of cirrhosis  Vaccines help protect you against viruses that can cause diseases such as the flu or hepatitis  Viral hepatitis is caused by a virus that leads to inflammation of the liver  You may need a hepatitis A or B vaccine  You may also need a pneumonia vaccine  Always get a flu vaccine each year as soon as it becomes available       · Ask about medicines  Some medicines can harm your liver  Acetaminophen is an example  Talk to your healthcare provider about all your medicines  Do not take any over-the-counter medicine or herbal supplements until your healthcare provider says it is okay  Follow up with your healthcare provider as directed:  Write down your questions so you remember to ask them during your visits  © Copyright [address] Information is for End User's use only and may not be sold, redistributed or otherwise used for commercial purposes  All illustrations and images included in CareNotes® are the copyrighted property of A D A M , Inc  or [facility] Freddie Castillo   The above information is an  only  It is not intended as medical advice for individual conditions or treatments  Talk to your doctor, nurse or pharmacist before following any medical regimen to see if it is safe and effective for you

## 2021-02-02 DIAGNOSIS — R73.03 PREDIABETES: ICD-10-CM

## 2021-02-05 ENCOUNTER — TELEPHONE (OUTPATIENT)
Dept: HEMATOLOGY ONCOLOGY | Facility: CLINIC | Age: 61
End: 2021-02-05

## 2021-02-05 NOTE — TELEPHONE ENCOUNTER
Her  is coming home from the hospital today and she would have to make arrangements for someone to stay with him on Monday  Please give her a call later and let her know if that is possible

## 2021-02-05 NOTE — TELEPHONE ENCOUNTER
Spoke with Leela Anderson will be sending a link for Teams and I will change the appointment in epic

## 2021-02-08 ENCOUNTER — TELEMEDICINE (OUTPATIENT)
Dept: HEMATOLOGY ONCOLOGY | Facility: CLINIC | Age: 61
End: 2021-02-08
Payer: COMMERCIAL

## 2021-02-08 DIAGNOSIS — D69.6 THROMBOCYTOPENIA, UNSPECIFIED (HCC): Primary | ICD-10-CM

## 2021-02-08 PROCEDURE — 99213 OFFICE O/P EST LOW 20 MIN: CPT | Performed by: PHYSICIAN ASSISTANT

## 2021-02-08 NOTE — PROGRESS NOTES
Virtual Regular Visit      Assessment/Plan:   1  Leukopenia with white blood cell count around 4000 since December 2019 differential is normal   WBC 4 35 with normal differential 12/2020        2  Thrombocytopenia with platelet count 142W to 140s since June 2020     3  Hepatomegaly and cirrhosis  Clinically palpable spleen edge with deep inspiration  Cytopenias suspected secondary to cirrhosis  White blood cell count and platelet count stable and platelets remain > 085       4   + RF with quantification 80IU/mL  Recommend ongoing observation with [facility]  If WBC < 3 5 on more than 1 occasion, differential becomes altered and /or platelet count < 70,437 on more than 1 occasion  Recommend re-evaluation  HPI:  Jose Tomas is a 62 y o  seen for initial consultation 10/12/2020 at the referral of Miguel Angel Huynh regarding Leukopenia        7/28/2020 hemoglobin 14 2, MCV 97, white blood cell count 3 98, 53% neutrophils, 32% lymphocytes, 7% monocytes, 6% eosinophils, 2% basophils  Platelet count 134     6/11/2020 hemoglobin 14 3, MCV 96, white blood cell count 4, 54% neutrophils, 35% lymphocytes, 6% monocytes, platelet count 106     12/10/2019 white blood cell count 4000 with normal differential     July 2018 white blood cell count 4 56     July 2015 [facility] hemoglobin 14 7, white blood cell count 4 2, platelets 031        Patient does have essential hypertension, hyperlipidemia, diabetes mellitus, cirrhosis  She sees Dr Praveena Rosales at [place] [address] re: her cirrhosis  She had EGD 1/2020  Esophagitis noted  There is no evidence of esophageal varices  2 centimeter hiatal hernia noted      Prior EGD and colonoscopy were 10/2017 per Dr Winslow Grise:  Possible small varix, 2 centimeter hiatal hernia    Pan diverticulosis without polyps was noted on colonoscopy      AFP 3 9 5/2019      Limited abd u/s 7/2020  LIVER:  Size:  Mildly enlarged   The liver measures 18 7 cm in the midclavicular line  Contour:  Surface contour is smooth  Parenchyma:  Coarsening and mild heterogeneity of the hepatic echotexture, consistent with the history of cirrhosis   Diffuse mildly increased echogenicity, suggesting fatty change  No evidence of mass  Main portal vein patent with hepatopetal flow      She denies any headaches, dizziness, fevers, chills, sweats, weight loss, adenopathy, early satiety, dyspepsia or dysphagia, changes with bowel or urinary habits  No persistent bone aches or pains  No muscle aches or pains  No increased bruising or bleeding  Interval History:     12/30/2020 hemoglobin 15 3, white blood cell count 4 35, 59% neutrophils, 32% lymphocytes, platelet count 806  CMP normal, TSH with free T4 reflex normal, CRP, ANTONIO, LDH normal   iron saturation 38%, ferritin 163,  Vitamin B12 369,   rheumatoid factor positive quantified at 80 Iu/per mL    She is feeling well  Encounter provider Vivienne Bradford PA-C    Provider located at [address] [place] [id number]      Recent Visits  Date Type Provider Dept   02/05/21 Telephone Jami Galeas [facility] Ching Martinez   Showing recent visits within past 7 days and meeting all other requirements     Future Appointments  No visits were found meeting these conditions  Showing future appointments within next 150 days and meeting all other requirements        The patient was identified by name and date of birth  Paulina Toney was informed that this is a telemedicine visit and that the visit is being conducted through   [facility] and patient was informed that this is a secure, HIPAA-compliant platform  She agrees to proceed     My office door was closed  No one else was in the room  She acknowledged consent and understanding of privacy and security of the video platform   The patient has agreed to participate and understands they can discontinue the visit at any time  Patient is aware this is a billable service  No past medical history on file  Past Surgical History:   Procedure Laterality Date    ACHILLES TENDON REPAIR Right     HYSTERECTOMY         Current Outpatient Medications   Medication Sig Dispense Refill    Accu-Chek Guide test strip USE TO CHECK BLOOD SUGAR ONCE DAILY AS DIRECTED 100 each 1    ALPRAZolam (XANAX) 0 25 mg tablet Take 1 tablet (0 25 mg total) by mouth daily at bedtime as needed for anxiety 30 tablet 0    atorvastatin (LIPITOR) 20 mg tablet Take 1 tablet (20 mg total) by mouth daily 90 tablet 1    carvedilol (COREG) 12 5 mg tablet       lisinopril-hydrochlorothiazide (PRINZIDE,ZESTORETIC) 10-12 5 MG per tablet Take 1 tablet by mouth daily 90 tablet 1    metFORMIN (GLUCOPHAGE) 500 mg tablet TAKE ONE TABLET BY MOUTH TWICE A DAY WITH MEALS 180 tablet 1     No current facility-administered medications for this visit  Allergies   Allergen Reactions    Penicillins        Review of Systems    Video Exam    There were no vitals filed for this visit  Physical Exam           VIRTUAL VISIT DISCLAIMER    Shazia León acknowledges that she has consented to an online visit or consultation  She understands that the online visit is based solely on information provided by her, and that, in the absence of a face-to-face physical evaluation by the physician, the diagnosis she receives is both limited and provisional in terms of accuracy and completeness  This is not intended to replace a full medical face-to-face evaluation by the physician  Shazia León understands and accepts these terms

## 2021-02-26 ENCOUNTER — TELEPHONE (OUTPATIENT)
Dept: PALLIATIVE MEDICINE | Facility: CLINIC | Age: 61
End: 2021-02-26

## 2021-03-02 ENCOUNTER — TELEPHONE (OUTPATIENT)
Dept: FAMILY MEDICINE CLINIC | Facility: CLINIC | Age: 61
End: 2021-03-02

## 2021-03-02 DIAGNOSIS — F41.9 ANXIETY: ICD-10-CM

## 2021-03-02 RX ORDER — ALPRAZOLAM 0.25 MG/1
0.25 TABLET ORAL
Qty: 30 TABLET | Refills: 0 | Status: SHIPPED | OUTPATIENT
Start: 2021-03-02 | End: 2021-04-19 | Stop reason: SDUPTHER

## 2021-03-02 NOTE — TELEPHONE ENCOUNTER
----- Message from [address] [place] [address] sent at 3/2/2021  1:54 PM EST -----  Regarding: Prescription Question  Contact: [phone]  Good afternoon,    May I have a refill of  Alprazolam 0 25mg      Thank you,    Haylee Lopez

## 2021-03-03 ENCOUNTER — IMMUNIZATIONS (OUTPATIENT)
Dept: FAMILY MEDICINE CLINIC | Facility: HOSPITAL | Age: 61
End: 2021-03-03

## 2021-03-03 DIAGNOSIS — Z23 ENCOUNTER FOR IMMUNIZATION: Primary | ICD-10-CM

## 2021-03-03 PROCEDURE — 0002A SARS-COV-2 / COVID-19 MRNA VACCINE (PFIZER-BIONTECH) 30 MCG: CPT

## 2021-03-03 PROCEDURE — 91300 SARS-COV-2 / COVID-19 MRNA VACCINE (PFIZER-BIONTECH) 30 MCG: CPT

## 2021-03-11 ENCOUNTER — APPOINTMENT (OUTPATIENT)
Dept: LAB | Facility: CLINIC | Age: 61
End: 2021-03-11
Payer: COMMERCIAL

## 2021-03-11 ENCOUNTER — TRANSCRIBE ORDERS (OUTPATIENT)
Dept: LAB | Facility: CLINIC | Age: 61
End: 2021-03-11

## 2021-03-11 LAB
AFP-TM SERPL-MCNC: 4.3 NG/ML (ref 0.5–8)
ALBUMIN SERPL BCP-MCNC: 3.7 G/DL (ref 3.5–5)
ALP SERPL-CCNC: 88 U/L (ref 46–116)
ALT SERPL W P-5'-P-CCNC: 27 U/L (ref 12–78)
ANION GAP SERPL CALCULATED.3IONS-SCNC: 4 MMOL/L (ref 4–13)
AST SERPL W P-5'-P-CCNC: 16 U/L (ref 5–45)
BILIRUB SERPL-MCNC: 0.6 MG/DL (ref 0.2–1)
BUN SERPL-MCNC: 9 MG/DL (ref 5–25)
CALCIUM SERPL-MCNC: 9.2 MG/DL (ref 8.3–10.1)
CHLORIDE SERPL-SCNC: 107 MMOL/L (ref 100–108)
CHOLEST SERPL-MCNC: 148 MG/DL (ref 50–200)
CO2 SERPL-SCNC: 29 MMOL/L (ref 21–32)
CREAT SERPL-MCNC: 0.68 MG/DL (ref 0.6–1.3)
ERYTHROCYTE [DISTWIDTH] IN BLOOD BY AUTOMATED COUNT: 12.7 % (ref 11.6–15.1)
GFR SERPL CREATININE-BSD FRML MDRD: 94 ML/MIN/1.73SQ M
GLUCOSE P FAST SERPL-MCNC: 114 MG/DL (ref 65–99)
HCT VFR BLD AUTO: 45.9 % (ref 34.8–46.1)
HDLC SERPL-MCNC: 52 MG/DL
HGB BLD-MCNC: 14.7 G/DL (ref 11.5–15.4)
INR PPP: 1.08 (ref 0.84–1.19)
LDLC SERPL CALC-MCNC: 79 MG/DL (ref 0–100)
MCH RBC QN AUTO: 30.7 PG (ref 26.8–34.3)
MCHC RBC AUTO-ENTMCNC: 32 G/DL (ref 31.4–37.4)
MCV RBC AUTO: 96 FL (ref 82–98)
PLATELET # BLD AUTO: 158 THOUSANDS/UL (ref 149–390)
PMV BLD AUTO: 10.8 FL (ref 8.9–12.7)
POTASSIUM SERPL-SCNC: 4.2 MMOL/L (ref 3.5–5.3)
PROT SERPL-MCNC: 7.1 G/DL (ref 6.4–8.2)
PROTHROMBIN TIME: 14.1 SECONDS (ref 11.6–14.5)
RBC # BLD AUTO: 4.79 MILLION/UL (ref 3.81–5.12)
SODIUM SERPL-SCNC: 140 MMOL/L (ref 136–145)
TRIGL SERPL-MCNC: 85 MG/DL
WBC # BLD AUTO: 3.95 THOUSAND/UL (ref 4.31–10.16)

## 2021-03-11 PROCEDURE — 36415 COLL VENOUS BLD VENIPUNCTURE: CPT | Performed by: INTERNAL MEDICINE

## 2021-03-11 PROCEDURE — 80061 LIPID PANEL: CPT

## 2021-03-11 PROCEDURE — 82105 ALPHA-FETOPROTEIN SERUM: CPT | Performed by: INTERNAL MEDICINE

## 2021-03-11 PROCEDURE — 85610 PROTHROMBIN TIME: CPT | Performed by: INTERNAL MEDICINE

## 2021-03-11 PROCEDURE — 80053 COMPREHEN METABOLIC PANEL: CPT | Performed by: INTERNAL MEDICINE

## 2021-03-11 PROCEDURE — 85027 COMPLETE CBC AUTOMATED: CPT | Performed by: INTERNAL MEDICINE

## 2021-03-24 ENCOUNTER — IMMUNIZATIONS (OUTPATIENT)
Dept: FAMILY MEDICINE CLINIC | Facility: HOSPITAL | Age: 61
End: 2021-03-24

## 2021-03-24 DIAGNOSIS — Z23 ENCOUNTER FOR IMMUNIZATION: Primary | ICD-10-CM

## 2021-03-24 PROCEDURE — 91300 SARS-COV-2 / COVID-19 MRNA VACCINE (PFIZER-BIONTECH) 30 MCG: CPT

## 2021-03-24 PROCEDURE — 0002A SARS-COV-2 / COVID-19 MRNA VACCINE (PFIZER-BIONTECH) 30 MCG: CPT

## 2021-04-19 ENCOUNTER — OFFICE VISIT (OUTPATIENT)
Dept: FAMILY MEDICINE CLINIC | Facility: CLINIC | Age: 61
End: 2021-04-19
Payer: COMMERCIAL

## 2021-04-19 VITALS
TEMPERATURE: 98.3 F | OXYGEN SATURATION: 97 % | HEART RATE: 66 BPM | RESPIRATION RATE: 16 BRPM | SYSTOLIC BLOOD PRESSURE: 122 MMHG | DIASTOLIC BLOOD PRESSURE: 72 MMHG | HEIGHT: 64 IN | WEIGHT: 262 LBS | BODY MASS INDEX: 44.73 KG/M2

## 2021-04-19 DIAGNOSIS — D69.6 THROMBOCYTOPENIA, UNSPECIFIED (HCC): ICD-10-CM

## 2021-04-19 DIAGNOSIS — D72.819 LEUKOPENIA, UNSPECIFIED TYPE: ICD-10-CM

## 2021-04-19 DIAGNOSIS — I10 ESSENTIAL HYPERTENSION: ICD-10-CM

## 2021-04-19 DIAGNOSIS — Z12.31 ENCOUNTER FOR SCREENING MAMMOGRAM FOR BREAST CANCER: ICD-10-CM

## 2021-04-19 DIAGNOSIS — F41.9 ANXIETY: ICD-10-CM

## 2021-04-19 DIAGNOSIS — E11.69 DIABETES MELLITUS TYPE 2 IN OBESE (HCC): ICD-10-CM

## 2021-04-19 DIAGNOSIS — E66.9 DIABETES MELLITUS TYPE 2 IN OBESE (HCC): ICD-10-CM

## 2021-04-19 DIAGNOSIS — F43.21 GRIEF: Primary | ICD-10-CM

## 2021-04-19 DIAGNOSIS — K74.69 OTHER CIRRHOSIS OF LIVER (HCC): ICD-10-CM

## 2021-04-19 PROCEDURE — 99214 OFFICE O/P EST MOD 30 MIN: CPT | Performed by: NURSE PRACTITIONER

## 2021-04-19 RX ORDER — ALPRAZOLAM 0.25 MG/1
0.25 TABLET ORAL
Qty: 30 TABLET | Refills: 0 | Status: SHIPPED | OUTPATIENT
Start: 2021-04-19 | End: 2021-07-28 | Stop reason: SDUPTHER

## 2021-04-19 NOTE — PROGRESS NOTES
Assessment/Plan:      Diagnoses and all orders for this visit:    Grief  Patient's  recently passed away  Patient reports that she has good social support  Discussion on the grieving process  Diabetes mellitus type 2 in obese [facility])  -     Comprehensive metabolic panel; Future  -     CBC and differential; Future  -     TSH, 3rd generation with Free T4 reflex; Future  -     Lipid Panel with Direct LDL reflex; Future  -     HEMOGLOBIN A1C W/ EAG ESTIMATION; Future    Lab work ordered  Continue metformin  Anxiety  -     ALPRAZolam (XANAX) 0 25 mg tablet; Take 1 tablet (0 25 mg total) by mouth daily at bedtime as needed for anxiety  -     Comprehensive metabolic panel; Future  -     CBC and differential; Future  -     TSH, 3rd generation with Free T4 reflex; Future    PDMP checked  Xanax refilled  Continue xanax prn for anxiety  Essential hypertension  -     Comprehensive metabolic panel; Future  -     CBC and differential; Future  -     TSH, 3rd generation with Free T4 reflex; Future  -     Lipid Panel with Direct LDL reflex; Future    BP stable  Continue lisinopril-HCTZ and Coreg  Leukopenia, unspecified type  -     CBC and differential; Future    Patient followed up with hematology  Repeat CBC ordered  Thrombocytopenia, unspecified (HCC)  -     CBC and differential; Future    Patient followed up with hematology  Repeat CBC ordered  Other cirrhosis of liver (HCC)  -     Comprehensive metabolic panel; Future    Continue to follow-up with GI  Encounter for screening mammogram for breast cancer  -     Mammo screening bilateral w 3d & cad; Future      Lab work ordered  Patient instructed to follow-up in 12 weeks or sooner prn  Subjective:     Patient ID: Minnie Zelaya is a 56 y o  female  Patient is here for a follow-up for chronic medical conditions  Patient is here for a follow-up for HTN  Patient takes lisinopril-HCTZ and coreg daily   Denies any chest pain, SOB, dizziness, or Has  Patient follows up with GI for liver cirrhosis  Patient is here for a follow-up for DM 2  Patient reports that she takes the metformin daily  Denies any nausea, vomiting, Has, or dizziness  Patient followed up with hematology for leukopenia and thrombocytopenia  Patient is here for a follow-up for anxiety  Patient reports that she takes xanax prn for anxiety and it helps  Denies any suicidal thoughts  Patient reports that her  passed away  Patient reports that she has good social support  Patient reports that she went back to work today  Patient is here for a follow-up for hyperlipidemia  Patient reports that she takes atorvastatin daily  Denies any side effects  Review of Systems   Constitutional: Negative for appetite change, chills, fatigue and fever  HENT: Negative for congestion, ear pain, sinus pressure and sore throat  Respiratory: Negative for cough, chest tightness, shortness of breath and wheezing  Cardiovascular: Negative for chest pain, palpitations and leg swelling  Gastrointestinal: Negative for abdominal pain, blood in stool, diarrhea, nausea and vomiting  Genitourinary: Negative for dysuria, frequency and hematuria  Musculoskeletal: Negative for myalgias  Skin: Negative for rash  Neurological: Negative for dizziness, seizures, syncope, weakness, light-headedness and headaches  Psychiatric/Behavioral: Negative for suicidal ideas  As noted in HPI  Objective:     Physical Exam  Vitals signs reviewed  Constitutional:       General: She is not in acute distress  Appearance: She is obese  She is not ill-appearing or diaphoretic  HENT:      Right Ear: External ear normal       Left Ear: External ear normal       Mouth/Throat:      Mouth: Mucous membranes are moist       Pharynx: Oropharynx is clear  No posterior oropharyngeal erythema     Eyes:      Conjunctiva/sclera: Conjunctivae normal       Pupils: Pupils are equal, round, and reactive to light  Cardiovascular:      Rate and Rhythm: Normal rate and regular rhythm  Pulses: Normal pulses  Heart sounds: Normal heart sounds  Comments: No edema noted  Pulmonary:      Effort: Pulmonary effort is normal  No respiratory distress  Breath sounds: Normal breath sounds  No wheezing  Musculoskeletal:      Comments: Gait wnl  Skin:     Findings: No rash  Neurological:      Mental Status: She is alert and oriented to person, place, and time     Psychiatric:         Mood and Affect: Mood normal

## 2021-05-01 ENCOUNTER — HOSPITAL ENCOUNTER (OUTPATIENT)
Dept: ULTRASOUND IMAGING | Facility: HOSPITAL | Age: 61
Discharge: HOME | End: 2021-05-01
Payer: COMMERCIAL

## 2021-05-01 DIAGNOSIS — K70.30 ALCOHOLIC CIRRHOSIS OF LIVER WITHOUT ASCITES (HCC): ICD-10-CM

## 2021-05-01 PROCEDURE — 76705 ECHO EXAM OF ABDOMEN: CPT

## 2021-05-03 DIAGNOSIS — R73.03 PREDIABETES: ICD-10-CM

## 2021-05-25 ENCOUNTER — OFFICE VISIT (OUTPATIENT)
Dept: GASTROENTEROLOGY | Facility: CLINIC | Age: 61
End: 2021-05-25
Payer: COMMERCIAL

## 2021-05-25 VITALS
HEIGHT: 64 IN | BODY MASS INDEX: 45.24 KG/M2 | HEART RATE: 66 BPM | DIASTOLIC BLOOD PRESSURE: 79 MMHG | SYSTOLIC BLOOD PRESSURE: 136 MMHG | WEIGHT: 265 LBS

## 2021-05-25 DIAGNOSIS — Z12.11 COLON CANCER SCREENING: ICD-10-CM

## 2021-05-25 DIAGNOSIS — D69.6 THROMBOCYTOPENIA, UNSPECIFIED (HCC): Primary | ICD-10-CM

## 2021-05-25 DIAGNOSIS — K74.69 OTHER CIRRHOSIS OF LIVER (HCC): ICD-10-CM

## 2021-05-25 DIAGNOSIS — K21.00 GASTROESOPHAGEAL REFLUX DISEASE WITH ESOPHAGITIS WITHOUT HEMORRHAGE: ICD-10-CM

## 2021-05-25 PROCEDURE — 99213 OFFICE O/P EST LOW 20 MIN: CPT | Performed by: INTERNAL MEDICINE

## 2021-05-25 NOTE — PROGRESS NOTES
[facility] - Outpatient Follow-up Note  Shireen Modi 56 y o  female MRN: [id number]  Encounter: [id number]          ASSESSMENT AND PLAN:      1  Other cirrhosis of liver (HCC)    Stable  [address]  Continue carvedilol  Will plan EGD to screen for varices  Repeat ultrasound in 6 months  Follow labs periodically  - EGD; Future  - PAT Covid Screening; Future  - US abdomen limited; Future  - CBC; Future  - Protime-INR; Future  - AFP tumor marker; Future    2  Thrombocytopenia, unspecified ([address] )    Improved  Will follow    3  Gastroesophageal reflux disease with esophagitis without hemorrhage   symptoms well controlled through dietary discretion    4  Colon cancer screening   colonoscopy due in 2026    ______________________________________________________________________    SUBJECTIVE:  Patient with history cirrhosis likely related to steatohepatitis from metabolic syndrome and exacerbated by alcohol use  Has been alcohol free for more than 20 years  Doing  Very well with no symptoms, namely no abdominal pain, nausea vomiting, jaundice or rash, bruising or bleeding, confusion, swelling or edema, changes in bowel habit or unexpected weight loss  Labs from March reviewed and stable  Thrombocytopenia has improved  Recent ultrasound  Without evidence of hepatoma  [address]  Has been somewhat depressed after  Her 's recent death  REVIEW OF SYSTEMS:    ROS       Historical Information   History reviewed  No pertinent past medical history    Past Surgical History:   Procedure Laterality Date    ACHILLES TENDON REPAIR Right     HYSTERECTOMY       Social History   Social History     Substance and Sexual Activity   Alcohol Use No    Comment: Sober for 20 years     Social History     Substance and Sexual Activity   Drug Use No     Social History     Tobacco Use   Smoking Status Current Every Day Smoker    Packs/day: 0 10    Years: 40 00    Pack years: 4 00    Types: Cigarettes   Smokeless Tobacco Never Used     Family History   Problem Relation Age of Onset    Lung cancer Sister     Hypertension Mother     No Known Problems Father     No Known Problems Maternal Grandmother     No Known Problems Maternal Grandfather     No Known Problems Paternal Grandmother     No Known Problems Paternal Grandfather        Meds/Allergies       Current Outpatient Medications:     Accu-Chek Guide test strip    ALPRAZolam (XANAX) 0 25 mg tablet    atorvastatin (LIPITOR) 20 mg tablet    carvedilol (COREG) 12 5 mg tablet    lisinopril-hydrochlorothiazide (PRINZIDE,ZESTORETIC) 10-12 5 MG per tablet    metFORMIN (GLUCOPHAGE) 500 mg tablet    Allergies   Allergen Reactions    Penicillins            Objective     Blood pressure 136/79, pulse 66, height 5' 4" (1 626 m), weight 120 kg (265 lb)  Body mass index is 45 49 kg/m²  PHYSICAL EXAM:      Physical Exam  Vitals signs and nursing note reviewed  Constitutional:       General: She is not in acute distress  Appearance: She is obese  She is not ill-appearing  HENT:      Head: Normocephalic and atraumatic  Eyes:      General: No scleral icterus  Extraocular Movements: Extraocular movements intact  Cardiovascular:      Rate and Rhythm: Normal rate and regular rhythm  Pulmonary:      Effort: Pulmonary effort is normal  No respiratory distress  Abdominal:      General: There is no distension  Palpations: Abdomen is soft  Tenderness: There is no abdominal tenderness  There is no guarding or rebound  Skin:     General: Skin is warm and dry  Coloration: Skin is not cyanotic  Findings: No erythema  Neurological:      General: No focal deficit present  Mental Status: She is alert and oriented to person, place, and time  Psychiatric:         Mood and Affect: Mood normal          Behavior: Behavior normal           Lab Results:   No visits with results within 1 Day(s) from this visit     Latest known visit with results is:   Office Visit on 01/13/2021   Component Date Value    AFP TUMOR MARKER 03/11/2021 4 3     Sodium 03/11/2021 140     Potassium 03/11/2021 4 2     Chloride 03/11/2021 107     CO2 03/11/2021 29     ANION GAP 03/11/2021 4     BUN 03/11/2021 9     Creatinine 03/11/2021 0 68     Glucose, Fasting 03/11/2021 114*    Calcium 03/11/2021 9 2     AST 03/11/2021 16     ALT 03/11/2021 27     Alkaline Phosphatase 03/11/2021 88     Total Protein 03/11/2021 7 1     Albumin 03/11/2021 3 7     Total Bilirubin 03/11/2021 0 60     eGFR 03/11/2021 94     WBC 03/11/2021 3 95*    RBC 03/11/2021 4 79     Hemoglobin 03/11/2021 14 7     Hematocrit 03/11/2021 45 9     MCV 03/11/2021 96     MCH 03/11/2021 30 7     MCHC 03/11/2021 32 0     RDW 03/11/2021 12 7     Platelets 32/79/4202 158     MPV 03/11/2021 10 8     Protime 03/11/2021 14 1     INR 03/11/2021 1 08          Radiology Results:   Us Right Upper Quadrant    Result Date: 5/3/2021  Narrative: RIGHT UPPER QUADRANT ULTRASOUND INDICATION:     K70 30: Alcoholic cirrhosis of liver without ascites  COMPARISON:  7/8/2020 TECHNIQUE:   Real-time ultrasound of the right upper quadrant was performed with a curvilinear transducer with both volumetric sweeps and still imaging techniques  FINDINGS: PANCREAS:  Visualized portions of the pancreas are within normal limits  AORTA AND IVC:  Visualized portions are normal for patient age  LIVER: Size:  Enlarged  The liver measures 19 1 cm in the midclavicular line  Contour:  Surface contour is slightly nodular  Parenchyma:  Increased echogenicity, posterior acoustic attenuation, decreased periportal echogenicity  Mild coarse echotexture No evidence of suspicious mass  Limited imaging of the main portal vein shows it to be patent and hepatopetal   BILIARY: 0 9 cm gallstone  No intrahepatic biliary dilatation  CBD measures 4 mm  No choledocholithiasis   Negative Gloria's sign KIDNEY: Right kidney measures 11 5 x 6 2 cm  Within normal limits  ASCITES:   None       Impression: Hepatomegaly Mild cirrhotic changes Hepatic moderate steatosis Cholelithiasis Workstation performed: [id number]

## 2021-06-04 ENCOUNTER — TELEPHONE (OUTPATIENT)
Dept: GASTROENTEROLOGY | Facility: CLINIC | Age: 61
End: 2021-06-04

## 2021-06-18 DIAGNOSIS — E11.9 TYPE 2 DIABETES MELLITUS WITHOUT COMPLICATION, WITHOUT LONG-TERM CURRENT USE OF INSULIN (HCC): ICD-10-CM

## 2021-06-21 RX ORDER — BLOOD SUGAR DIAGNOSTIC
STRIP MISCELLANEOUS
Qty: 100 STRIP | Refills: 1 | Status: SHIPPED | OUTPATIENT
Start: 2021-06-21 | End: 2021-12-16

## 2021-07-21 ENCOUNTER — APPOINTMENT (OUTPATIENT)
Dept: LAB | Facility: CLINIC | Age: 61
End: 2021-07-21
Payer: COMMERCIAL

## 2021-07-21 DIAGNOSIS — E11.69 DIABETES MELLITUS TYPE 2 IN OBESE (HCC): ICD-10-CM

## 2021-07-21 DIAGNOSIS — D69.6 THROMBOCYTOPENIA, UNSPECIFIED (HCC): ICD-10-CM

## 2021-07-21 DIAGNOSIS — D72.819 LEUKOPENIA, UNSPECIFIED TYPE: ICD-10-CM

## 2021-07-21 DIAGNOSIS — E66.9 DIABETES MELLITUS TYPE 2 IN OBESE (HCC): ICD-10-CM

## 2021-07-21 DIAGNOSIS — K74.69 OTHER CIRRHOSIS OF LIVER (HCC): ICD-10-CM

## 2021-07-21 DIAGNOSIS — I10 ESSENTIAL HYPERTENSION: ICD-10-CM

## 2021-07-21 DIAGNOSIS — F41.9 ANXIETY: ICD-10-CM

## 2021-07-21 LAB
BASOPHILS # BLD AUTO: 0.05 THOUSANDS/ΜL (ref 0–0.1)
BASOPHILS NFR BLD AUTO: 1 % (ref 0–1)
CHOLEST SERPL-MCNC: 156 MG/DL (ref 50–200)
EOSINOPHIL # BLD AUTO: 0.15 THOUSAND/ΜL (ref 0–0.61)
EOSINOPHIL NFR BLD AUTO: 3 % (ref 0–6)
ERYTHROCYTE [DISTWIDTH] IN BLOOD BY AUTOMATED COUNT: 12.5 % (ref 11.6–15.1)
EST. AVERAGE GLUCOSE BLD GHB EST-MCNC: 137 MG/DL
HBA1C MFR BLD: 6.4 %
HCT VFR BLD AUTO: 47.1 % (ref 34.8–46.1)
HDLC SERPL-MCNC: 53 MG/DL
HGB BLD-MCNC: 15.4 G/DL (ref 11.5–15.4)
IMM GRANULOCYTES # BLD AUTO: 0.01 THOUSAND/UL (ref 0–0.2)
IMM GRANULOCYTES NFR BLD AUTO: 0 % (ref 0–2)
LDLC SERPL CALC-MCNC: 87 MG/DL (ref 0–100)
LYMPHOCYTES # BLD AUTO: 1.51 THOUSANDS/ΜL (ref 0.6–4.47)
LYMPHOCYTES NFR BLD AUTO: 34 % (ref 14–44)
MCH RBC QN AUTO: 31 PG (ref 26.8–34.3)
MCHC RBC AUTO-ENTMCNC: 32.7 G/DL (ref 31.4–37.4)
MCV RBC AUTO: 95 FL (ref 82–98)
MONOCYTES # BLD AUTO: 0.28 THOUSAND/ΜL (ref 0.17–1.22)
MONOCYTES NFR BLD AUTO: 6 % (ref 4–12)
NEUTROPHILS # BLD AUTO: 2.51 THOUSANDS/ΜL (ref 1.85–7.62)
NEUTS SEG NFR BLD AUTO: 56 % (ref 43–75)
NRBC BLD AUTO-RTO: 0 /100 WBCS
PLATELET # BLD AUTO: 171 THOUSANDS/UL (ref 149–390)
PMV BLD AUTO: 10.5 FL (ref 8.9–12.7)
RBC # BLD AUTO: 4.97 MILLION/UL (ref 3.81–5.12)
TRIGL SERPL-MCNC: 82 MG/DL
TSH SERPL DL<=0.05 MIU/L-ACNC: 3.29 UIU/ML (ref 0.36–3.74)
WBC # BLD AUTO: 4.51 THOUSAND/UL (ref 4.31–10.16)

## 2021-07-21 PROCEDURE — 85025 COMPLETE CBC W/AUTO DIFF WBC: CPT

## 2021-07-21 PROCEDURE — 80061 LIPID PANEL: CPT

## 2021-07-21 PROCEDURE — 83036 HEMOGLOBIN GLYCOSYLATED A1C: CPT

## 2021-07-21 PROCEDURE — 84443 ASSAY THYROID STIM HORMONE: CPT

## 2021-07-23 ENCOUNTER — ANESTHESIA EVENT (OUTPATIENT)
Dept: GASTROENTEROLOGY | Facility: HOSPITAL | Age: 61
End: 2021-07-23

## 2021-07-23 ENCOUNTER — HOSPITAL ENCOUNTER (OUTPATIENT)
Dept: GASTROENTEROLOGY | Facility: HOSPITAL | Age: 61
Setting detail: OUTPATIENT SURGERY
Discharge: HOME | End: 2021-07-23
Attending: INTERNAL MEDICINE | Admitting: INTERNAL MEDICINE
Payer: COMMERCIAL

## 2021-07-23 ENCOUNTER — ANESTHESIA (OUTPATIENT)
Dept: GASTROENTEROLOGY | Facility: HOSPITAL | Age: 61
End: 2021-07-23

## 2021-07-23 VITALS
OXYGEN SATURATION: 98 % | DIASTOLIC BLOOD PRESSURE: 59 MMHG | HEART RATE: 66 BPM | WEIGHT: 264 LBS | HEIGHT: 64 IN | SYSTOLIC BLOOD PRESSURE: 111 MMHG | TEMPERATURE: 97.8 F | RESPIRATION RATE: 13 BRPM | BODY MASS INDEX: 45.07 KG/M2

## 2021-07-23 DIAGNOSIS — K74.69 OTHER CIRRHOSIS OF LIVER (HCC): ICD-10-CM

## 2021-07-23 LAB — GLUCOSE SERPL-MCNC: 135 MG/DL (ref 65–140)

## 2021-07-23 PROCEDURE — 88305 TISSUE EXAM BY PATHOLOGIST: CPT | Performed by: PATHOLOGY

## 2021-07-23 PROCEDURE — 82948 REAGENT STRIP/BLOOD GLUCOSE: CPT

## 2021-07-23 PROCEDURE — 43239 EGD BIOPSY SINGLE/MULTIPLE: CPT | Performed by: INTERNAL MEDICINE

## 2021-07-23 RX ORDER — SODIUM CHLORIDE, SODIUM LACTATE, POTASSIUM CHLORIDE, CALCIUM CHLORIDE 600; 310; 30; 20 MG/100ML; MG/100ML; MG/100ML; MG/100ML
125 INJECTION, SOLUTION INTRAVENOUS CONTINUOUS
Status: DISCONTINUED | OUTPATIENT
Start: 2021-07-23 | End: 2021-07-27 | Stop reason: HOSPADM

## 2021-07-23 RX ORDER — PROPOFOL 10 MG/ML
INJECTION, EMULSION INTRAVENOUS AS NEEDED
Status: DISCONTINUED | OUTPATIENT
Start: 2021-07-23 | End: 2021-07-23

## 2021-07-23 RX ORDER — SODIUM CHLORIDE, SODIUM LACTATE, POTASSIUM CHLORIDE, CALCIUM CHLORIDE 600; 310; 30; 20 MG/100ML; MG/100ML; MG/100ML; MG/100ML
INJECTION, SOLUTION INTRAVENOUS CONTINUOUS PRN
Status: DISCONTINUED | OUTPATIENT
Start: 2021-07-23 | End: 2021-07-23

## 2021-07-23 RX ORDER — LIDOCAINE HYDROCHLORIDE 10 MG/ML
INJECTION, SOLUTION EPIDURAL; INFILTRATION; INTRACAUDAL; PERINEURAL AS NEEDED
Status: DISCONTINUED | OUTPATIENT
Start: 2021-07-23 | End: 2021-07-23

## 2021-07-23 RX ADMIN — PROPOFOL 120 MG: 10 INJECTION, EMULSION INTRAVENOUS at 12:34

## 2021-07-23 RX ADMIN — SODIUM CHLORIDE, SODIUM LACTATE, POTASSIUM CHLORIDE, AND CALCIUM CHLORIDE: .6; .31; .03; .02 INJECTION, SOLUTION INTRAVENOUS at 12:28

## 2021-07-23 RX ADMIN — LIDOCAINE HYDROCHLORIDE 50 MG: 10 INJECTION, SOLUTION EPIDURAL; INFILTRATION; INTRACAUDAL; PERINEURAL at 12:34

## 2021-07-23 NOTE — H&P
History and Physical - [facility]  Marianela Garcia 56 y o  female MRN: [id number]                  HPI: Marianela Garcia is a 56y o  year old female who presents for EGD for evaluation of esophageal varices in the setting of cirrhosis      REVIEW OF SYSTEMS: Per the HPI, and otherwise unremarkable  Historical Information   History reviewed  No pertinent past medical history    Past Surgical History:   Procedure Laterality Date    ACHILLES TENDON REPAIR Right     HYSTERECTOMY       Social History   Social History     Substance and Sexual Activity   Alcohol Use No    Comment: Sober for 20 years     Social History     Substance and Sexual Activity   Drug Use No     Social History     Tobacco Use   Smoking Status Current Every Day Smoker    Packs/day: 0 10    Years: 40 00    Pack years: 4 00    Types: Cigarettes   Smokeless Tobacco Never Used     Family History   Problem Relation Age of Onset    Lung cancer Sister     Hypertension Mother     No Known Problems Father     No Known Problems Maternal Grandmother     No Known Problems Maternal Grandfather     No Known Problems Paternal Grandmother     No Known Problems Paternal Grandfather        Meds/Allergies       Current Outpatient Medications:     ALPRAZolam (XANAX) 0 25 mg tablet    atorvastatin (LIPITOR) 20 mg tablet    carvedilol (COREG) 12 5 mg tablet    lisinopril-hydrochlorothiazide (PRINZIDE,ZESTORETIC) 10-12 5 MG per tablet    metFORMIN (GLUCOPHAGE) 500 mg tablet    Accu-Chek Guide test strip    Current Facility-Administered Medications:     lactated ringers infusion, 125 mL/hr, Intravenous, Continuous    Allergies   Allergen Reactions    Penicillins        Objective     /62   Pulse 74   Temp 97 7 °F (36 5 °C) (Temporal)   Resp 18   Ht 5' 4" (1 626 m)   Wt 120 kg (264 lb)   SpO2 95%   BMI 45 32 kg/m²       PHYSICAL EXAM    Gen: NAD  Head: NCAT  CV: RRR  CHEST: Clear  ABD: soft, NT/ND  EXT: no edema      ASSESSMENT/PLAN:  This is a 56y o  year old female here for EGD for evaluation of esophageal varices setting of cirrhosis, and she is stable and optimized for her procedure

## 2021-07-23 NOTE — ANESTHESIA POSTPROCEDURE EVALUATION
Post-Op Assessment Note    CV Status:  Stable  Pain Score: 0    Pain management: adequate     Mental Status:  Awake and sleepy   Hydration Status:  Euvolemic   PONV Controlled:  Controlled   Airway Patency:  Patent      Post Op Vitals Reviewed: Yes      Staff: CRNA         No complications documented      BP   103/55   Temp   97 1   Pulse  66   Resp   16   SpO2   97

## 2021-07-23 NOTE — DISCHARGE INSTRUCTIONS
Upper Endoscopy   WHAT YOU NEED TO KNOW:   An upper endoscopy is also called an upper gastrointestinal (GI) endoscopy, or an esophagogastroduodenoscopy (EGD)  You may feel bloated, gassy, or have some abdominal discomfort after your procedure  Your throat may be sore for 24 to 36 hours  You may burp or pass gas from air that is still inside your body  DISCHARGE INSTRUCTIONS:   Call 911 for any of the following:   · You have sudden chest pain or trouble breathing  Seek care immediately if:   · You feel dizzy or faint  · You have trouble swallowing  · Your bowel movements are very dark or black  · Your abdomen is hard and firm and you have severe pain  · You vomit blood  Contact your healthcare provider if:   · You feel full or bloated and cannot burp or pass gas  · You have not had a bowel movement for 3 days after your procedure  · You have neck pain  · You have a fever or chills  · You have nausea or are vomiting  · You have a rash or hives  · You have questions or concerns about your endoscopy  Relieve a sore throat:  Suck on throat lozenges or crushed ice  Gargle with a small amount of warm salt water  Mix 1 teaspoon of salt and 1 cup of warm water to make salt water  Relieve gas and discomfort from bloating:  Lie on your right side with a heating pad on your abdomen  Take short walks to help pass gas  Eat small meals until bloating is relieved  Rest after your procedure: You have been given medicine to relax you  Do not  drive or make important decisions until the day after your procedure  Return to your normal activity as directed  You can usually return to work the day after your procedure  Follow up with your healthcare provider as directed:  Write down your questions so you remember to ask them during your visits     © 2017 [address] is for End User's use only and may not be sold, redistributed or otherwise used for commercial purposes  All illustrations and images included in CareNotes® are the copyrighted property of [employer]  or Yasmani Haskins  The above information is an  only  It is not intended as medical advice for individual conditions or treatments  Talk to your doctor, nurse or pharmacist before following any medical regimen to see if it is safe and effective for you  Hiatal Hernia   WHAT YOU NEED TO KNOW:   A hiatal hernia is a condition that causes part of your stomach to bulge through the hiatus (small opening) in your diaphragm  The part of the stomach may move up and down, or it may get trapped above the diaphragm  DISCHARGE INSTRUCTIONS:   Seek care immediately if:   · You have severe abdominal pain  · You try to vomit but nothing comes out (retching)  · You have severe chest pain and sudden trouble breathing  · Your bowel movements are black or bloody  · Your vomit looks like coffee grounds or has blood in it  Contact your healthcare provider if:   · Your symptoms are getting worse  · You have nausea, and you are vomiting  · You are losing weight without trying  · You have questions or concerns about your condition or care  Medicines:   · Medicines  may be given to relieve heartburn symptoms  These medicines help to decrease or block stomach acid  You may also be given medicines that help to tighten the esophageal sphincter  · Take your medicine as directed  Contact your healthcare provider if you think your medicine is not helping or if you have side effects  Tell him or her if you are allergic to any medicine  Keep a list of the medicines, vitamins, and herbs you take  Include the amounts, and when and why you take them  Bring the list or the pill bottles to follow-up visits  Carry your medicine list with you in case of an emergency      Follow up with your healthcare provider as directed:  Write down your questions so you remember to ask them during your visits  Self care:   · Avoid foods that make your symptoms worse  These may include spicy foods, fruit juices, alcohol, caffeine, chocolate, and mint  · Eat several small meals during the day  Small meals give your stomach less food to digest     · Avoid lying down and bending forward after you eat  Do not eat meals 2 to 3 hours before bedtime  This decreases your risk for reflux  · Maintain a healthy weight  If you are overweight, weight loss may help relieve your symptoms  · Sleep with your head elevated  at least 6 inches  · Do not smoke  Smoking can increase your symptoms of heartburn  © Copyright [employer] 2021 Information is for End User's use only and may not be sold, redistributed or otherwise used for commercial purposes  All illustrations and images included in CareNotes® are the copyrighted property of [employer]  or [facility] Freddie Castillo   The above information is an  only  It is not intended as medical advice for individual conditions or treatments  Talk to your doctor, nurse or pharmacist before following any medical regimen to see if it is safe and effective for you

## 2021-07-23 NOTE — ANESTHESIA PREPROCEDURE EVALUATION
Procedure:  EGD    Relevant Problems   CARDIO   (+) Essential hypertension   (+) Mixed hyperlipidemia      ENDO   (+) Type 2 diabetes mellitus with complication, without long-term current use of insulin (HCC)      GI/HEPATIC   (+) Other cirrhosis of liver (HCC)      HEMATOLOGY   (+) Thrombocytopenia, unspecified (HCC)      MUSCULOSKELETAL   (+) Sciatica of left side      NEURO/PSYCH   (+) Anxiety        Physical Exam    Airway    Mallampati score: II  TM Distance: >3 FB  Neck ROM: full     Dental       Cardiovascular  Rhythm: regular, Rate: normal, Cardiovascular exam normal    Pulmonary  Pulmonary exam normal Breath sounds clear to auscultation,     Other Findings        Anesthesia Plan  ASA Score- 3     Anesthesia Type- IV sedation with anesthesia with ASA Monitors  Additional Monitors:   Airway Plan:           Plan Factors-Exercise tolerance (METS): >4 METS  Chart reviewed  EKG reviewed  Existing labs reviewed  Patient is a current smoker  Patient instructed to abstain from smoking on day of procedure  Patient smoked on day of surgery  There is medical exclusion for perioperative obstructive sleep apnea risk education  Induction- intravenous  Postoperative Plan-     Informed Consent- Anesthetic plan and risks discussed with patient  I personally reviewed this patient with the CRNA  Discussed and agreed on the Anesthesia Plan with the CRNA  Meka Ndiaye

## 2021-07-28 DIAGNOSIS — F41.9 ANXIETY: ICD-10-CM

## 2021-07-28 RX ORDER — ALPRAZOLAM 0.25 MG/1
0.25 TABLET ORAL
Qty: 30 TABLET | Refills: 0 | Status: SHIPPED | OUTPATIENT
Start: 2021-07-28 | End: 2021-10-27 | Stop reason: SDUPTHER

## 2021-07-30 NOTE — RESULT ENCOUNTER NOTE
Please call the patient regarding her result  Biopsies benign  Repeat EGD in 1 year    Follow-up with me as scheduled

## 2021-08-19 ENCOUNTER — OFFICE VISIT (OUTPATIENT)
Dept: FAMILY MEDICINE CLINIC | Facility: CLINIC | Age: 61
End: 2021-08-19
Payer: COMMERCIAL

## 2021-08-19 VITALS
SYSTOLIC BLOOD PRESSURE: 132 MMHG | DIASTOLIC BLOOD PRESSURE: 74 MMHG | WEIGHT: 273 LBS | BODY MASS INDEX: 46.61 KG/M2 | HEART RATE: 68 BPM | OXYGEN SATURATION: 98 % | HEIGHT: 64 IN | RESPIRATION RATE: 16 BRPM

## 2021-08-19 DIAGNOSIS — I10 ESSENTIAL HYPERTENSION: ICD-10-CM

## 2021-08-19 DIAGNOSIS — F41.9 ANXIETY: ICD-10-CM

## 2021-08-19 DIAGNOSIS — D69.6 THROMBOCYTOPENIA, UNSPECIFIED (HCC): ICD-10-CM

## 2021-08-19 DIAGNOSIS — E11.8 TYPE 2 DIABETES MELLITUS WITH COMPLICATION, WITHOUT LONG-TERM CURRENT USE OF INSULIN (HCC): Primary | ICD-10-CM

## 2021-08-19 DIAGNOSIS — E78.2 MIXED HYPERLIPIDEMIA: ICD-10-CM

## 2021-08-19 PROCEDURE — 99214 OFFICE O/P EST MOD 30 MIN: CPT | Performed by: FAMILY MEDICINE

## 2021-08-19 NOTE — ASSESSMENT & PLAN NOTE
Stable on intermittent Xanax 0 25 milligram q h s  P r n  Wilton Hodge Patient will call back for medication refills

## 2021-08-19 NOTE — PROGRESS NOTES
Subjective:      Patient ID: Meredith Abreu is a 56 y o  female  Diabetes  She presents for her follow-up diabetic visit  Her disease course has been stable (A1C 6 4)  Pertinent negatives for hypoglycemia include no headaches  Pertinent negatives for diabetes include no chest pain, no polydipsia and no polyuria  There are no hypoglycemic complications  Symptoms are stable  There are no diabetic complications  Risk factors for coronary artery disease include diabetes mellitus, dyslipidemia, hypertension and post-menopausal  Current diabetic treatments:  metformin 500 milligram b i d  She is compliant with treatment all of the time  She is following a diabetic diet  Meal planning includes avoidance of concentrated sweets  She participates in exercise daily  An ACE inhibitor/angiotensin II receptor blocker is being taken  Eye exam is current  Hypertension  This is a chronic problem  The current episode started more than 1 year ago  The problem is controlled  Associated symptoms include anxiety  Pertinent negatives include no chest pain, headaches, palpitations or shortness of breath  Risk factors for coronary artery disease include dyslipidemia and diabetes mellitus  Treatments tried: Lisinopril hydrochlorothiazide 10/12 5  The current treatment provides significant improvement  There are no compliance problems  Hyperlipidemia  This is a chronic problem  The problem is controlled  Recent lipid tests were reviewed and are normal  Pertinent negatives include no chest pain, myalgias or shortness of breath  Current antihyperlipidemic treatment includes statins  There are no compliance problems  Risk factors for coronary artery disease include diabetes mellitus, dyslipidemia and hypertension  Anxiety  Presents for follow-up visit  Symptoms include insomnia  Patient reports no chest pain, palpitations or shortness of breath  Symptoms occur occasionally   The severity of symptoms is interfering with daily activities and causing significant distress  Compliance with medications: Xanax 0 25 milligram q h s  P r n  Treatment side effects: none  History reviewed  No pertinent past medical history  Family History   Problem Relation Age of Onset    Lung cancer Sister     Hypertension Mother     No Known Problems Father     No Known Problems Maternal Grandmother     No Known Problems Maternal Grandfather     No Known Problems Paternal Grandmother     No Known Problems Paternal Grandfather        Past Surgical History:   Procedure Laterality Date    ACHILLES TENDON REPAIR Right     HYSTERECTOMY          reports that she has been smoking cigarettes  She has a 4 00 pack-year smoking history  She has never used smokeless tobacco  She reports that she does not drink alcohol and does not use drugs  Current Outpatient Medications:     Accu-Chek Guide test strip, USE TO CHECK BLOOD SUGAR ONCE DAILY AS DIRECTED, Disp: 100 strip, Rfl: 1    ALPRAZolam (XANAX) 0 25 mg tablet, Take 1 tablet (0 25 mg total) by mouth daily at bedtime as needed for anxiety, Disp: 30 tablet, Rfl: 0    atorvastatin (LIPITOR) 20 mg tablet, TAKE ONE TABLET BY MOUTH EVERY DAY, Disp: 30 tablet, Rfl: 0    carvedilol (COREG) 12 5 mg tablet, Take 1 tablet (12 5 mg total) by mouth 2 (two) times a day with meals, Disp: 60 tablet, Rfl: 5    lisinopril-hydrochlorothiazide (PRINZIDE,ZESTORETIC) 10-12 5 MG per tablet, TAKE ONE TABLET BY MOUTH EVERY DAY, Disp: 30 tablet, Rfl: 0    metFORMIN (GLUCOPHAGE) 500 mg tablet, TAKE ONE TABLET BY MOUTH TWICE A DAY WITH MEALS, Disp: 180 tablet, Rfl: 1    The following portions of the patient's history were reviewed and updated as appropriate: allergies, current medications, past family history, past medical history, past social history, past surgical history and problem list     Review of Systems   Constitutional: Negative  Eyes: Negative  Respiratory: Negative    Negative for shortness of breath  Cardiovascular: Negative  Negative for chest pain and palpitations  Gastrointestinal: Negative  Endocrine: Negative  Negative for polydipsia and polyuria  Genitourinary: Negative  Musculoskeletal: Negative  Negative for myalgias  Neurological: Negative  Negative for headaches  Psychiatric/Behavioral: The patient has insomnia  Objective:    /74   Pulse 68   Resp 16   Ht 5' 4" (1 626 m)   Wt 124 kg (273 lb)   SpO2 98%   BMI 46 86 kg/m²      Physical Exam  Constitutional:       Appearance: She is well-developed  Eyes:      Pupils: Pupils are equal, round, and reactive to light  Cardiovascular:      Rate and Rhythm: Normal rate and regular rhythm  Pulses: no weak pulses     Heart sounds: Normal heart sounds  Pulmonary:      Effort: Pulmonary effort is normal       Breath sounds: Normal breath sounds  Abdominal:      General: Bowel sounds are normal       Palpations: Abdomen is soft  Musculoskeletal:         General: Normal range of motion  Cervical back: Normal range of motion  Feet:      Right foot:      Skin integrity: No ulcer, skin breakdown, erythema, warmth, callus or dry skin  Left foot:      Skin integrity: No ulcer, skin breakdown, erythema, warmth, callus or dry skin  Neurological:      Mental Status: She is alert and oriented to person, place, and time  Psychiatric:         Behavior: Behavior normal          Judgment: Judgment normal            Patient's shoes and socks removed  Right Foot/Ankle   Right Foot Inspection  Skin Exam: skin normal and skin intact no dry skin, no warmth, no callus, no erythema, no maceration, no abnormal color, no pre-ulcer, no ulcer and no callus                          Toe Exam: ROM and strength within normal limits  Sensory   Vibration: intact  Proprioception: intact   Monofilament testing: intact  Vascular  Capillary refills: < 3 seconds      Left Foot/Ankle  Left Foot Inspection  Skin Exam: skin normal and skin intactno dry skin, no warmth, no erythema, no maceration, normal color, no pre-ulcer, no ulcer and no callus                         Toe Exam: ROM and strength within normal limits                   Sensory   Vibration: intact  Proprioception: intact  Monofilament: intact  Vascular  Capillary refills: < 3 seconds    Assign Risk Category:  No deformity present; No loss of protective sensation;  No weak pulses       Risk: 0        Recent Results (from the past 1008 hour(s))   Comprehensive metabolic panel    Collection Time: 07/21/21  8:20 AM   Result Value Ref Range    Sodium 136 136 - 145 mmol/L    Potassium 4 5 3 5 - 5 3 mmol/L    Chloride 103 100 - 108 mmol/L    CO2 27 21 - 32 mmol/L    ANION GAP 6 4 - 13 mmol/L    BUN 13 5 - 25 mg/dL    Creatinine 0 67 0 60 - 1 30 mg/dL    Glucose, Fasting 137 (H) 65 - 99 mg/dL    Calcium 9 4 8 3 - 10 1 mg/dL    AST 26 5 - 45 U/L    ALT 37 12 - 78 U/L    Alkaline Phosphatase 91 46 - 116 U/L    Total Protein 7 3 6 4 - 8 2 g/dL    Albumin 3 8 3 5 - 5 0 g/dL    Total Bilirubin 0 58 0 20 - 1 00 mg/dL    eGFR 95 ml/min/1 73sq m   Protime-INR    Collection Time: 07/21/21  8:20 AM   Result Value Ref Range    Protime 13 5 11 6 - 14 5 seconds    INR 1 03 0 84 - 1 19   AFP tumor marker    Collection Time: 07/21/21  8:20 AM   Result Value Ref Range    AFP TUMOR MARKER 4 3 0 5 - 8 ng/mL   CBC and differential    Collection Time: 07/21/21  8:20 AM   Result Value Ref Range    WBC 4 51 4 31 - 10 16 Thousand/uL    RBC 4 97 3 81 - 5 12 Million/uL    Hemoglobin 15 4 11 5 - 15 4 g/dL    Hematocrit 47 1 (H) 34 8 - 46 1 %    MCV 95 82 - 98 fL    MCH 31 0 26 8 - 34 3 pg    MCHC 32 7 31 4 - 37 4 g/dL    RDW 12 5 11 6 - 15 1 %    MPV 10 5 8 9 - 12 7 fL    Platelets 399 373 - 028 Thousands/uL    nRBC 0 /100 WBCs    Neutrophils Relative 56 43 - 75 %    Immat GRANS % 0 0 - 2 %    Lymphocytes Relative 34 14 - 44 %    Monocytes Relative 6 4 - 12 %    Eosinophils Relative 3 0 - 6 %    Basophils Relative 1 0 - 1 %    Neutrophils Absolute 2 51 1 85 - 7 62 Thousands/µL    Immature Grans Absolute 0 01 0 00 - 0 20 Thousand/uL    Lymphocytes Absolute 1 51 0 60 - 4 47 Thousands/µL    Monocytes Absolute 0 28 0 17 - 1 22 Thousand/µL    Eosinophils Absolute 0 15 0 00 - 0 61 Thousand/µL    Basophils Absolute 0 05 0 00 - 0 10 Thousands/µL   TSH, 3rd generation with Free T4 reflex    Collection Time: 07/21/21  8:20 AM   Result Value Ref Range    TSH 3RD GENERATON 3 290 0 358 - 3 740 uIU/mL   Lipid Panel with Direct LDL reflex    Collection Time: 07/21/21  8:20 AM   Result Value Ref Range    Cholesterol 156 50 - 200 mg/dL    Triglycerides 82 <=150 mg/dL    HDL, Direct 53 >=40 mg/dL    LDL Calculated 87 0 - 100 mg/dL   HEMOGLOBIN A1C W/ EAG ESTIMATION    Collection Time: 07/21/21  8:20 AM   Result Value Ref Range    Hemoglobin A1C 6 4 (H) Normal 3 8-5 6%; PreDiabetic 5 7-6 4%; Diabetic >=6 5%; Glycemic control for adults with diabetes <7 0% %     mg/dl   Fingerstick Glucose (POCT)    Collection Time: 07/23/21 11:58 AM   Result Value Ref Range    POC Glucose 135 65 - 140 mg/dl   Tissue Exam    Collection Time: 07/23/21 12:38 PM   Result Value Ref Range    Case Report       Surgical Pathology Report                         Case: [id number]                                   Authorizing Provider:  Yolanda Rosado MD         Collected:           07/23/2021 1238              Ordering Location:     [place] [address]   Received:            07/23/2021 1407                                     Endoscopy                                                                    Pathologist:           Philip Hays MD                                                                 Specimen:    Stomach, bx antrum r/o h pylori                                                            Final Diagnosis       A    Stomach, antrum, biopsy:  - Gastric antral mucosa with no significant pathologic alteration   - Negative for intestinal metaplasia, dysplasia or carcinoma  - No Helicobacter pylori is identified on H&E stained slide  Note       Interpretation performed at [facility]  [address] [facility]        Additional Information       All reported additional testing was performed with appropriately reactive controls  These tests were developed and their performance characteristics determined by [facility] or appropriate performing facility, though some tests may be performed on tissues which have not been validated for performance characteristics (such as staining performed on alcohol exposed cell blocks and decalcified tissues)  Results should be interpreted with caution and in the context of the patients clinical condition  These tests may not be cleared or approved by the [place]  Food and Drug Administration, though the FDA has determined that such clearance or approval is not necessary  These tests are used for clinical purposes and they should not be regarded as investigational or for research  This laboratory has been approved by [place] 88, designated as a high-complexity laboratory and is qualified to perform these tests  Hardik Fierro Description          A  The specimen is received in formalin, labeled with the patient's name and hospital number, and is designated "stomach antrum biopsy rule out H pylori  The specimen consists of 4 tan-pink soft tissue fragments ranging from 0 3-0 6 cm in greatest dimension  The specimen is entirely submitted in a screened cassette  Note: The estimated total formalin fixation time based upon information provided by the submitting clinician and the standard processing schedule is under 72 hours    Juliana           Assessment/Plan:             Problem List Items Addressed This Visit        Endocrine    Type 2 diabetes mellitus with complication, without long-term current use of insulin ([facility] [address] ) - Primary       Lab Results   Component Value Date    HGBA1C 6 4 (H) 07/21/2021     A1c stable  Patient forgetting to take the morning dose of metformin  Advised to try taking 1000 milligram at bedtime to help improve compliance  Suggested metabolic labs/follow-up at 6 month interval          Relevant Orders    Diabetic foot exam    Comprehensive metabolic panel    Hemoglobin A1C    Microalbumin / creatinine urine ratio       Cardiovascular and Mediastinum    Essential hypertension     Patient's blood pressure is at goal   Continue lisinopril hydrochlorothiazide 10/12 5 milligram            Other    Mixed hyperlipidemia     LDL is at goal   Continue atorvastatin 20 milligrams         Relevant Orders    Lipid panel    Anxiety     Stable on intermittent Xanax 0 25 milligram q h s  P r n  Lowell Rodriguez Patient will call back for medication refills  Thrombocytopenia, unspecified (HCC)     CBC stable  Patient asymptomatic  Continue monitoring  Relevant Orders    CBC and differential        BMI Counseling: Body mass index is 46 86 kg/m²  The BMI is above normal  Nutrition recommendations include reducing portion sizes, decreasing overall calorie intake, 3-5 servings of fruits/vegetables daily and reducing fast food intake  Exercise recommendations include moderate aerobic physical activity for 150 minutes/week

## 2021-08-19 NOTE — ASSESSMENT & PLAN NOTE
Lab Results   Component Value Date    HGBA1C 6 4 (H) 07/21/2021     A1c stable  Patient forgetting to take the morning dose of metformin  Advised to try taking 1000 milligram at bedtime to help improve compliance    Suggested metabolic labs/follow-up at 6 month interval

## 2021-10-20 ENCOUNTER — OFFICE VISIT (OUTPATIENT)
Dept: GASTROENTEROLOGY | Facility: CLINIC | Age: 61
End: 2021-10-20
Payer: COMMERCIAL

## 2021-10-20 VITALS
DIASTOLIC BLOOD PRESSURE: 94 MMHG | HEIGHT: 64 IN | HEART RATE: 69 BPM | SYSTOLIC BLOOD PRESSURE: 150 MMHG | BODY MASS INDEX: 46.61 KG/M2 | WEIGHT: 273 LBS

## 2021-10-20 DIAGNOSIS — K74.69 OTHER CIRRHOSIS OF LIVER (HCC): Primary | ICD-10-CM

## 2021-10-20 DIAGNOSIS — K21.00 GASTROESOPHAGEAL REFLUX DISEASE WITH ESOPHAGITIS WITHOUT HEMORRHAGE: ICD-10-CM

## 2021-10-20 PROCEDURE — 99213 OFFICE O/P EST LOW 20 MIN: CPT | Performed by: INTERNAL MEDICINE

## 2021-10-27 DIAGNOSIS — F41.9 ANXIETY: ICD-10-CM

## 2021-10-27 RX ORDER — ALPRAZOLAM 0.25 MG/1
0.25 TABLET ORAL
Qty: 30 TABLET | Refills: 0 | Status: SHIPPED | OUTPATIENT
Start: 2021-10-27 | End: 2022-04-12 | Stop reason: SDUPTHER

## 2021-10-30 ENCOUNTER — HOSPITAL ENCOUNTER (OUTPATIENT)
Dept: ULTRASOUND IMAGING | Facility: HOSPITAL | Age: 61
Discharge: HOME | End: 2021-10-30
Attending: INTERNAL MEDICINE
Payer: COMMERCIAL

## 2021-10-30 DIAGNOSIS — E78.2 MIXED HYPERLIPIDEMIA: ICD-10-CM

## 2021-10-30 DIAGNOSIS — R73.03 PREDIABETES: ICD-10-CM

## 2021-10-30 DIAGNOSIS — K74.69 OTHER CIRRHOSIS OF LIVER (HCC): ICD-10-CM

## 2021-10-30 DIAGNOSIS — I10 ESSENTIAL HYPERTENSION: ICD-10-CM

## 2021-10-30 PROCEDURE — 76705 ECHO EXAM OF ABDOMEN: CPT

## 2021-11-01 RX ORDER — LISINOPRIL AND HYDROCHLOROTHIAZIDE 12.5; 1 MG/1; MG/1
TABLET ORAL
Qty: 30 TABLET | Refills: 0 | Status: SHIPPED | OUTPATIENT
Start: 2021-11-01 | End: 2021-11-29

## 2021-11-01 RX ORDER — ATORVASTATIN CALCIUM 20 MG/1
TABLET, FILM COATED ORAL
Qty: 30 TABLET | Refills: 0 | Status: SHIPPED | OUTPATIENT
Start: 2021-11-01 | End: 2021-11-29

## 2021-11-05 DIAGNOSIS — K74.69 OTHER CIRRHOSIS OF LIVER (HCC): Primary | ICD-10-CM

## 2021-11-23 ENCOUNTER — APPOINTMENT (OUTPATIENT)
Dept: LAB | Facility: CLINIC | Age: 61
End: 2021-11-23
Payer: COMMERCIAL

## 2021-11-23 DIAGNOSIS — K74.69 OTHER CIRRHOSIS OF LIVER (HCC): ICD-10-CM

## 2021-11-23 LAB — AFP-TM SERPL-MCNC: 4.1 NG/ML (ref 0.5–8)

## 2021-11-23 PROCEDURE — 36415 COLL VENOUS BLD VENIPUNCTURE: CPT

## 2021-11-23 PROCEDURE — 82105 ALPHA-FETOPROTEIN SERUM: CPT

## 2021-11-26 DIAGNOSIS — E78.2 MIXED HYPERLIPIDEMIA: ICD-10-CM

## 2021-11-26 DIAGNOSIS — I10 ESSENTIAL HYPERTENSION: ICD-10-CM

## 2021-11-29 RX ORDER — ATORVASTATIN CALCIUM 20 MG/1
TABLET, FILM COATED ORAL
Qty: 30 TABLET | Refills: 0 | Status: SHIPPED | OUTPATIENT
Start: 2021-11-29 | End: 2021-12-28

## 2021-11-29 RX ORDER — LISINOPRIL AND HYDROCHLOROTHIAZIDE 12.5; 1 MG/1; MG/1
TABLET ORAL
Qty: 30 TABLET | Refills: 0 | Status: SHIPPED | OUTPATIENT
Start: 2021-11-29 | End: 2021-12-28

## 2021-12-15 DIAGNOSIS — E11.9 TYPE 2 DIABETES MELLITUS WITHOUT COMPLICATION, WITHOUT LONG-TERM CURRENT USE OF INSULIN (HCC): ICD-10-CM

## 2021-12-16 RX ORDER — BLOOD SUGAR DIAGNOSTIC
STRIP MISCELLANEOUS
Qty: 100 STRIP | Refills: 1 | Status: SHIPPED | OUTPATIENT
Start: 2021-12-16

## 2021-12-26 DIAGNOSIS — I10 ESSENTIAL HYPERTENSION: ICD-10-CM

## 2021-12-26 DIAGNOSIS — E78.2 MIXED HYPERLIPIDEMIA: ICD-10-CM

## 2021-12-28 RX ORDER — ATORVASTATIN CALCIUM 20 MG/1
TABLET, FILM COATED ORAL
Qty: 30 TABLET | Refills: 0 | Status: SHIPPED | OUTPATIENT
Start: 2021-12-28 | End: 2021-12-29

## 2021-12-28 RX ORDER — LISINOPRIL AND HYDROCHLOROTHIAZIDE 12.5; 1 MG/1; MG/1
TABLET ORAL
Qty: 30 TABLET | Refills: 0 | Status: SHIPPED | OUTPATIENT
Start: 2021-12-28 | End: 2021-12-29

## 2021-12-29 DIAGNOSIS — E78.2 MIXED HYPERLIPIDEMIA: ICD-10-CM

## 2021-12-29 DIAGNOSIS — I10 ESSENTIAL HYPERTENSION: ICD-10-CM

## 2021-12-29 RX ORDER — LISINOPRIL AND HYDROCHLOROTHIAZIDE 12.5; 1 MG/1; MG/1
TABLET ORAL
Qty: 30 TABLET | Refills: 0 | Status: SHIPPED | OUTPATIENT
Start: 2021-12-29 | End: 2022-01-28

## 2021-12-29 RX ORDER — ATORVASTATIN CALCIUM 20 MG/1
TABLET, FILM COATED ORAL
Qty: 30 TABLET | Refills: 0 | Status: SHIPPED | OUTPATIENT
Start: 2021-12-29 | End: 2022-01-28

## 2022-01-28 DIAGNOSIS — E78.2 MIXED HYPERLIPIDEMIA: ICD-10-CM

## 2022-01-28 DIAGNOSIS — I10 ESSENTIAL HYPERTENSION: ICD-10-CM

## 2022-01-28 RX ORDER — LISINOPRIL AND HYDROCHLOROTHIAZIDE 12.5; 1 MG/1; MG/1
TABLET ORAL
Qty: 30 TABLET | Refills: 0 | Status: SHIPPED | OUTPATIENT
Start: 2022-01-28 | End: 2022-02-21 | Stop reason: SDUPTHER

## 2022-01-28 RX ORDER — ATORVASTATIN CALCIUM 20 MG/1
TABLET, FILM COATED ORAL
Qty: 30 TABLET | Refills: 0 | Status: SHIPPED | OUTPATIENT
Start: 2022-01-28 | End: 2022-02-21 | Stop reason: SDUPTHER

## 2022-02-13 DIAGNOSIS — K74.69 OTHER CIRRHOSIS OF LIVER (HCC): ICD-10-CM

## 2022-02-13 RX ORDER — CARVEDILOL 12.5 MG/1
TABLET ORAL
Qty: 60 TABLET | Refills: 5 | Status: SHIPPED | OUTPATIENT
Start: 2022-02-13 | End: 2022-04-07 | Stop reason: SDUPTHER

## 2022-02-17 ENCOUNTER — APPOINTMENT (OUTPATIENT)
Dept: LAB | Facility: CLINIC | Age: 62
End: 2022-02-17
Payer: COMMERCIAL

## 2022-02-17 DIAGNOSIS — E78.2 MIXED HYPERLIPIDEMIA: ICD-10-CM

## 2022-02-17 DIAGNOSIS — E11.8 TYPE 2 DIABETES MELLITUS WITH COMPLICATION, WITHOUT LONG-TERM CURRENT USE OF INSULIN (HCC): ICD-10-CM

## 2022-02-17 DIAGNOSIS — D69.6 THROMBOCYTOPENIA, UNSPECIFIED (HCC): ICD-10-CM

## 2022-02-17 LAB
BASOPHILS # BLD AUTO: 0.03 THOUSANDS/ΜL (ref 0–0.1)
BASOPHILS NFR BLD AUTO: 1 % (ref 0–1)
CHOLEST SERPL-MCNC: 150 MG/DL
CREAT UR-MCNC: 312 MG/DL
EOSINOPHIL # BLD AUTO: 0.15 THOUSAND/ΜL (ref 0–0.61)
EOSINOPHIL NFR BLD AUTO: 4 % (ref 0–6)
ERYTHROCYTE [DISTWIDTH] IN BLOOD BY AUTOMATED COUNT: 12.9 % (ref 11.6–15.1)
EST. AVERAGE GLUCOSE BLD GHB EST-MCNC: 137 MG/DL
HBA1C MFR BLD: 6.4 %
HCT VFR BLD AUTO: 47.1 % (ref 34.8–46.1)
HDLC SERPL-MCNC: 48 MG/DL
HGB BLD-MCNC: 15 G/DL (ref 11.5–15.4)
IMM GRANULOCYTES # BLD AUTO: 0.01 THOUSAND/UL (ref 0–0.2)
IMM GRANULOCYTES NFR BLD AUTO: 0 % (ref 0–2)
LDLC SERPL CALC-MCNC: 80 MG/DL (ref 0–100)
LYMPHOCYTES # BLD AUTO: 1.06 THOUSANDS/ΜL (ref 0.6–4.47)
LYMPHOCYTES NFR BLD AUTO: 25 % (ref 14–44)
MCH RBC QN AUTO: 30.8 PG (ref 26.8–34.3)
MCHC RBC AUTO-ENTMCNC: 31.8 G/DL (ref 31.4–37.4)
MCV RBC AUTO: 97 FL (ref 82–98)
MICROALBUMIN UR-MCNC: 18.5 MG/L (ref 0–20)
MICROALBUMIN/CREAT 24H UR: 6 MG/G CREATININE (ref 0–30)
MONOCYTES # BLD AUTO: 0.26 THOUSAND/ΜL (ref 0.17–1.22)
MONOCYTES NFR BLD AUTO: 6 % (ref 4–12)
NEUTROPHILS # BLD AUTO: 2.82 THOUSANDS/ΜL (ref 1.85–7.62)
NEUTS SEG NFR BLD AUTO: 64 % (ref 43–75)
NONHDLC SERPL-MCNC: 102 MG/DL
NRBC BLD AUTO-RTO: 0 /100 WBCS
PLATELET # BLD AUTO: 172 THOUSANDS/UL (ref 149–390)
PMV BLD AUTO: 10.9 FL (ref 8.9–12.7)
RBC # BLD AUTO: 4.87 MILLION/UL (ref 3.81–5.12)
TRIGL SERPL-MCNC: 112 MG/DL
WBC # BLD AUTO: 4.33 THOUSAND/UL (ref 4.31–10.16)

## 2022-02-17 PROCEDURE — 82570 ASSAY OF URINE CREATININE: CPT

## 2022-02-17 PROCEDURE — 80061 LIPID PANEL: CPT

## 2022-02-17 PROCEDURE — 83036 HEMOGLOBIN GLYCOSYLATED A1C: CPT

## 2022-02-17 PROCEDURE — 3061F NEG MICROALBUMINURIA REV: CPT | Performed by: NURSE PRACTITIONER

## 2022-02-17 PROCEDURE — 85025 COMPLETE CBC W/AUTO DIFF WBC: CPT

## 2022-02-17 PROCEDURE — 82043 UR ALBUMIN QUANTITATIVE: CPT

## 2022-02-17 PROCEDURE — 3044F HG A1C LEVEL LT 7.0%: CPT | Performed by: NURSE PRACTITIONER

## 2022-02-21 ENCOUNTER — OFFICE VISIT (OUTPATIENT)
Dept: FAMILY MEDICINE CLINIC | Facility: CLINIC | Age: 62
End: 2022-02-21
Payer: COMMERCIAL

## 2022-02-21 VITALS
OXYGEN SATURATION: 98 % | BODY MASS INDEX: 46.95 KG/M2 | RESPIRATION RATE: 16 BRPM | DIASTOLIC BLOOD PRESSURE: 78 MMHG | SYSTOLIC BLOOD PRESSURE: 142 MMHG | HEIGHT: 64 IN | HEART RATE: 84 BPM | WEIGHT: 275 LBS

## 2022-02-21 DIAGNOSIS — I10 ESSENTIAL HYPERTENSION: ICD-10-CM

## 2022-02-21 DIAGNOSIS — E66.9 DIABETES MELLITUS TYPE 2 IN OBESE (HCC): ICD-10-CM

## 2022-02-21 DIAGNOSIS — K74.69 OTHER CIRRHOSIS OF LIVER (HCC): ICD-10-CM

## 2022-02-21 DIAGNOSIS — E11.69 DIABETES MELLITUS TYPE 2 IN OBESE (HCC): ICD-10-CM

## 2022-02-21 DIAGNOSIS — E78.2 MIXED HYPERLIPIDEMIA: ICD-10-CM

## 2022-02-21 DIAGNOSIS — E66.01 CLASS 3 SEVERE OBESITY DUE TO EXCESS CALORIES WITH BODY MASS INDEX (BMI) OF 45.0 TO 49.9 IN ADULT, UNSPECIFIED WHETHER SERIOUS COMORBIDITY PRESENT (HCC): ICD-10-CM

## 2022-02-21 DIAGNOSIS — F43.21 GRIEF: Primary | ICD-10-CM

## 2022-02-21 DIAGNOSIS — F41.9 ANXIETY: ICD-10-CM

## 2022-02-21 PROCEDURE — 4004F PT TOBACCO SCREEN RCVD TLK: CPT | Performed by: NURSE PRACTITIONER

## 2022-02-21 PROCEDURE — 3078F DIAST BP <80 MM HG: CPT | Performed by: NURSE PRACTITIONER

## 2022-02-21 PROCEDURE — 3077F SYST BP >= 140 MM HG: CPT | Performed by: NURSE PRACTITIONER

## 2022-02-21 PROCEDURE — 3725F SCREEN DEPRESSION PERFORMED: CPT | Performed by: NURSE PRACTITIONER

## 2022-02-21 PROCEDURE — 99214 OFFICE O/P EST MOD 30 MIN: CPT | Performed by: NURSE PRACTITIONER

## 2022-02-21 PROCEDURE — 3008F BODY MASS INDEX DOCD: CPT | Performed by: NURSE PRACTITIONER

## 2022-02-21 RX ORDER — ATORVASTATIN CALCIUM 20 MG/1
20 TABLET, FILM COATED ORAL DAILY
Qty: 90 TABLET | Refills: 1 | Status: SHIPPED | OUTPATIENT
Start: 2022-02-21

## 2022-02-21 RX ORDER — LISINOPRIL AND HYDROCHLOROTHIAZIDE 12.5; 1 MG/1; MG/1
1 TABLET ORAL DAILY
Qty: 90 TABLET | Refills: 1 | Status: SHIPPED | OUTPATIENT
Start: 2022-02-21

## 2022-02-21 NOTE — PROGRESS NOTES
Assessment/Plan:      Diagnoses and all orders for this visit:    Grief  Patient's sister  in November from lung cancer  Patient reports that she is still grieving  Denies any suicidal thoughts  Discussed referral for counseling  Patient reports that she will think about it  Follow-up in 2 weeks or sooner prn  Diabetes mellitus type 2 in obese [facility])  -     Ambulatory Referral to Diabetic Education; Future    Hemoglobin A1c 6 4  Continue metformin 500mg BID  Patient referred for DM education  Anxiety  Stable  Continue xanax prn  Essential hypertension  -     lisinopril-hydrochlorothiazide (PRINZIDE,ZESTORETIC) 10-12 5 MG per tablet; Take 1 tablet by mouth daily    /78  Patient reports that she forgot to take her BP medication this morning  Denies any chest pain or SOB  Patient instructed to follow-up in 2 weeks for a BP check  Mixed hyperlipidemia  -     atorvastatin (LIPITOR) 20 mg tablet; Take 1 tablet (20 mg total) by mouth daily    Total cholesterol 150, Triglycerides 112, and LDL 80  Patient reports that she wants to go to DM education and work more on her diet  Repeat lipid panel in 6 months  Other cirrhosis of liver ([facility] )  Continue to follow-up with gastroenterology  Class 3 severe obesity due to excess calories with (BMI) of 45 0 to 49 9 in adult [facility])  Patient instructed to eat a healthy low fat/diabetic diet  Reviewed lab results with the patient  Patient instructed to follow-up in 2 weeks or sooner prn  Subjective:     Patient ID: Alice Russo is a 59 y o  female  Patient is here for a follow-up for chronic medical conditions  Patient reports that she feels fine  Patient follows up with gastroenterology for liver cirrhosis  Patient is here for a follow-up for DM 2 and obesity  Patient reports that she is trying to eat healthier now  Patient reports that she takes the metformin twice a day   Denies any Has, dizziness, nausea, vomiting, or increased urinary frequency  Patient reports that she would like a referral for DM education  Patient is here for a follow-up for HTN  Patient reports that she usually takes her medications as prescribed but missed her BP medication this morning  Denies any chest pain or SOB  Patient is here for a follow-up for hyperlipidemia  Patient reports that she takes atorvastatin daily  Patient is here for a follow-up for anxiety  Patient reports that her anxiety is stable  Patient reports that she only takes xanax if she really needs it  Patient reports that she is still grieving the loss of her sister who passed away in November 2021 from lung cancer  Denies any suicidal thoughts  Review of Systems   Constitutional: Negative for chills and fever  HENT: Negative for congestion, ear pain, sinus pressure and sore throat  Respiratory: Negative for cough, chest tightness, shortness of breath and wheezing  Cardiovascular: Negative for chest pain and palpitations  Gastrointestinal: Negative for abdominal pain, blood in stool, diarrhea, nausea and vomiting  Genitourinary: Negative for dysuria, frequency and hematuria  Skin: Negative for rash  Neurological: Negative for dizziness, syncope, light-headedness and headaches  Psychiatric/Behavioral: Negative for suicidal ideas  As noted in HPI  Objective:     Physical Exam  Vitals reviewed  Constitutional:       General: She is not in acute distress  Appearance: She is obese  She is not ill-appearing or diaphoretic  HENT:      Right Ear: External ear normal       Left Ear: External ear normal       Mouth/Throat:      Comments: Patient is masked  Eyes:      Conjunctiva/sclera: Conjunctivae normal    Cardiovascular:      Rate and Rhythm: Normal rate and regular rhythm  Pulses: Normal pulses  Heart sounds: Normal heart sounds     Pulmonary:      Effort: Pulmonary effort is normal  No respiratory distress  Breath sounds: Normal breath sounds  No wheezing  Musculoskeletal:      Comments: Gait wnl  Skin:     Findings: No rash  Neurological:      Mental Status: She is alert and oriented to person, place, and time     Psychiatric:         Mood and Affect: Mood normal

## 2022-03-01 ENCOUNTER — RA CDI HCC (OUTPATIENT)
Dept: OTHER | Facility: HOSPITAL | Age: 62
End: 2022-03-01

## 2022-03-01 NOTE — PROGRESS NOTES
[facility]  coding opportunities       Chart reviewed, no opportunity found: CHART REVIEWED, NO OPPORTUNITY FOUND                        Patients insurance company: [employer] (Medicare Advantage and Commercial)

## 2022-03-02 ENCOUNTER — OFFICE VISIT (OUTPATIENT)
Dept: GASTROENTEROLOGY | Facility: CLINIC | Age: 62
End: 2022-03-02
Payer: COMMERCIAL

## 2022-03-02 VITALS
SYSTOLIC BLOOD PRESSURE: 146 MMHG | HEIGHT: 64 IN | BODY MASS INDEX: 47.46 KG/M2 | WEIGHT: 278 LBS | HEART RATE: 69 BPM | DIASTOLIC BLOOD PRESSURE: 92 MMHG

## 2022-03-02 DIAGNOSIS — K74.69 OTHER CIRRHOSIS OF LIVER (HCC): Primary | ICD-10-CM

## 2022-03-02 DIAGNOSIS — Z12.11 COLON CANCER SCREENING: ICD-10-CM

## 2022-03-02 DIAGNOSIS — K21.00 GASTROESOPHAGEAL REFLUX DISEASE WITH ESOPHAGITIS WITHOUT HEMORRHAGE: ICD-10-CM

## 2022-03-02 PROCEDURE — 99213 OFFICE O/P EST LOW 20 MIN: CPT | Performed by: INTERNAL MEDICINE

## 2022-03-02 PROCEDURE — 3077F SYST BP >= 140 MM HG: CPT | Performed by: INTERNAL MEDICINE

## 2022-03-02 PROCEDURE — 3080F DIAST BP >= 90 MM HG: CPT | Performed by: INTERNAL MEDICINE

## 2022-03-02 NOTE — PROGRESS NOTES
[facility] - Outpatient Follow-up Note  Idalia Doe 59 y o  female MRN: [id number]  Encounter: [id number]          ASSESSMENT AND PLAN:      1  Other cirrhosis of liver ([facility] )  Likely multifactorial due to steatohepatitis from metabolic syndrome and alcohol  Continues to abstain  Has gained some weight  Due for surveillance ultrasound next month  Will continue carvedilol  Will be due for surveillance endoscopy in July which will be scheduled at her next visit  Repeat labs in 3 months    2  Gastroesophageal reflux disease with esophagitis without hemorrhage  Symptoms well controlled through dietary discretion off of medication  3  Colon cancer screening  Unremarkable colonoscopy 2017 with Dr Aamir Alonso  Will be due for colonoscopy in 2027    ______________________________________________________________________    SUBJECTIVE:  Patient returns in follow-up of cirrhosis  Doing very well with no complaints  Recent lab work reviewed with normal LFTs, creatinine, bilirubin, INR    REVIEW OF SYSTEMS:    Review of Systems   All other systems reviewed and are negative           Historical Information   Past Medical History:   Diagnosis Date    Diabetes mellitus ([facility] )     Hypertension      Past Surgical History:   Procedure Laterality Date    ACHILLES TENDON REPAIR Right     COLONOSCOPY      HYSTERECTOMY      UPPER GASTROINTESTINAL ENDOSCOPY       Social History   Social History     Substance and Sexual Activity   Alcohol Use No    Comment: Sober for 20 years     Social History     Substance and Sexual Activity   Drug Use No     Social History     Tobacco Use   Smoking Status Current Every Day Smoker    Packs/day: 0 10    Years: 40 00    Pack years: 4 00    Types: Cigarettes   Smokeless Tobacco Never Used     Family History   Problem Relation Age of Onset    Lung cancer Sister     Hypertension Mother     No Known Problems Father     No Known Problems Maternal Grandmother     No Known Problems Maternal Grandfather     No Known Problems Paternal Grandmother     No Known Problems Paternal Grandfather        Meds/Allergies       Current Outpatient Medications:     Accu-Chek Guide test strip    ALPRAZolam (XANAX) 0 25 mg tablet    atorvastatin (LIPITOR) 20 mg tablet    carvedilol (COREG) 12 5 mg tablet    lisinopril-hydrochlorothiazide (PRINZIDE,ZESTORETIC) 10-12 5 MG per tablet    metFORMIN (GLUCOPHAGE) 500 mg tablet    Allergies   Allergen Reactions    Penicillins            Objective     Blood pressure 146/92, pulse 69, height 5' 4" (1 626 m), weight 126 kg (278 lb)  Body mass index is 47 72 kg/m²  PHYSICAL EXAM:      Physical Exam  Vitals and nursing note reviewed  Constitutional:       General: She is not in acute distress  Appearance: She is obese  She is not ill-appearing  HENT:      Head: Normocephalic and atraumatic  Eyes:      General: No scleral icterus  Extraocular Movements: Extraocular movements intact  Cardiovascular:      Rate and Rhythm: Normal rate and regular rhythm  Pulmonary:      Effort: Pulmonary effort is normal  No respiratory distress  Abdominal:      General: There is no distension  Palpations: Abdomen is soft  Tenderness: There is no abdominal tenderness  There is no guarding or rebound  Musculoskeletal:      Right lower leg: No edema  Left lower leg: No edema  Skin:     General: Skin is warm and dry  Coloration: Skin is not cyanotic  Findings: No erythema  Neurological:      General: No focal deficit present  Mental Status: She is alert and oriented to person, place, and time  Psychiatric:         Mood and Affect: Mood normal          Behavior: Behavior normal           Lab Results:   No visits with results within 1 Day(s) from this visit     Latest known visit with results is:   Appointment on 02/17/2022   Component Date Value    Hemoglobin A1C 02/17/2022 6 4*    EAG 02/17/2022 137     Cholesterol 02/17/2022 150     Triglycerides 02/17/2022 112     HDL, Direct 02/17/2022 48*    LDL Calculated 02/17/2022 80     Non-HDL-Chol (CHOL-HDL) 02/17/2022 102     Creatinine, Ur 02/17/2022 312 0     Microalbum  ,U,Random 02/17/2022 18 5     Microalb Creat Ratio 02/17/2022 6     WBC 02/17/2022 4 33     RBC 02/17/2022 4 87     Hemoglobin 02/17/2022 15 0     Hematocrit 02/17/2022 47 1*    MCV 02/17/2022 97     MCH 02/17/2022 30 8     MCHC 02/17/2022 31 8     RDW 02/17/2022 12 9     MPV 02/17/2022 10 9     Platelets 65/13/6405 172     nRBC 02/17/2022 0     Neutrophils Relative 02/17/2022 64     Immat GRANS % 02/17/2022 0     Lymphocytes Relative 02/17/2022 25     Monocytes Relative 02/17/2022 6     Eosinophils Relative 02/17/2022 4     Basophils Relative 02/17/2022 1     Neutrophils Absolute 02/17/2022 2 82     Immature Grans Absolute 02/17/2022 0 01     Lymphocytes Absolute 02/17/2022 1 06     Monocytes Absolute 02/17/2022 0 26     Eosinophils Absolute 02/17/2022 0 15     Basophils Absolute 02/17/2022 0 03          Radiology Results:   No results found

## 2022-03-07 ENCOUNTER — OFFICE VISIT (OUTPATIENT)
Dept: FAMILY MEDICINE CLINIC | Facility: CLINIC | Age: 62
End: 2022-03-07
Payer: COMMERCIAL

## 2022-03-07 VITALS
DIASTOLIC BLOOD PRESSURE: 68 MMHG | HEART RATE: 67 BPM | RESPIRATION RATE: 16 BRPM | SYSTOLIC BLOOD PRESSURE: 122 MMHG | WEIGHT: 275 LBS | HEIGHT: 64 IN | BODY MASS INDEX: 46.95 KG/M2 | OXYGEN SATURATION: 98 %

## 2022-03-07 DIAGNOSIS — K74.69 OTHER CIRRHOSIS OF LIVER (HCC): ICD-10-CM

## 2022-03-07 DIAGNOSIS — I10 ESSENTIAL HYPERTENSION: ICD-10-CM

## 2022-03-07 DIAGNOSIS — F43.21 GRIEF: Primary | ICD-10-CM

## 2022-03-07 DIAGNOSIS — E78.2 MIXED HYPERLIPIDEMIA: ICD-10-CM

## 2022-03-07 DIAGNOSIS — F41.9 ANXIETY: ICD-10-CM

## 2022-03-07 DIAGNOSIS — E66.01 CLASS 3 SEVERE OBESITY DUE TO EXCESS CALORIES WITHOUT SERIOUS COMORBIDITY WITH BODY MASS INDEX (BMI) OF 45.0 TO 49.9 IN ADULT (HCC): ICD-10-CM

## 2022-03-07 DIAGNOSIS — E11.8 TYPE 2 DIABETES MELLITUS WITH COMPLICATION, WITHOUT LONG-TERM CURRENT USE OF INSULIN (HCC): ICD-10-CM

## 2022-03-07 PROCEDURE — 99214 OFFICE O/P EST MOD 30 MIN: CPT | Performed by: NURSE PRACTITIONER

## 2022-03-07 PROCEDURE — 3008F BODY MASS INDEX DOCD: CPT | Performed by: NURSE PRACTITIONER

## 2022-03-07 PROCEDURE — 4004F PT TOBACCO SCREEN RCVD TLK: CPT | Performed by: NURSE PRACTITIONER

## 2022-03-07 NOTE — PROGRESS NOTES
Assessment/Plan:      Diagnoses and all orders for this visit:    Grief   Patient's sister passed away in November 2021  Patient reports that she has been feeling better  Denies any suicidal thoughts  Patient does not want to see a counselor at this time  Anxiety  Patient reports that she takes xanax 2-3 times a month prn for anxiety and it helps  Type 2 diabetes mellitus with complication, without long-term current use of insulin (HCC)  -     HEMOGLOBIN A1C W/ EAG ESTIMATION; Future  -     TSH, 3rd generation with Free T4 reflex; Future    Hemoglobin A1c 6 4  Continue metformin 500mg BID  Repeat Hemoglobin A1c ordered  Essential hypertension  -     TSH, 3rd generation with Free T4 reflex; Future    /68  Continue current medications  Gastroenterology ordered CMP and CBC  Other cirrhosis of liver ([facility] )  Continue to follow-up with gastroenterology  Mixed hyperlipidemia  -     Lipid Panel with Direct LDL reflex; Future  -     TSH, 3rd generation with Free T4 reflex; Future    Total cholesterol 150, LDL 80  Patient reports that she wants to work on her diet and lose more weight  Continue atorvastatin daily  Repeat lipid panel ordered  Class 3 severe obesity due to excess calories without serious comorbidity with body mass index (BMI) of 45 0 to 49 9 in adult (HCC)  -     HEMOGLOBIN A1C W/ EAG ESTIMATION; Future  -     TSH, 3rd generation with Free T4 reflex; Future    Patient instructed to eat a healthy low fat diet and to exercise on a regular basis  Patient instructed to follow-up in 3 months or sooner prn  Subjective:     Patient ID: Julianna Donovan is a 59 y o  female  Patient is here for a follow-up for chronic medical conditions  Patient is here for a follow-up for grief  Patient's sister passed away in November 2021  Patient reports that she is feeling better  Denies any suicidal thoughts     Patient does not want to follow-up with a counselor at this time  Patient takes xanax prn for anxiety and it helps  Patient reports that she only takes it 2-3 times a month  Patient is here for a follow-up for HTN  Patient takes lisinopril-HCTZ daily  Denies any chest pain or SOB  Patient follows up with GI for liver cirrhosis  Patient takes coreg daily  Patient is here for a follow-up for diabetes  Patient takes metformin 500mg BID for Type 2 DM  Patient reports that she watches her diet  Patient is here for a follow-up for obesity and hyperlipidemia  Patient takes atorvastatin 20mg daily  Patient reports that she is trying to watch her diet and lose weight  Review of Systems   Constitutional: Negative for chills and fever  HENT: Negative for congestion, ear pain, sinus pressure and sore throat  Respiratory: Negative for cough, chest tightness, shortness of breath and wheezing  Cardiovascular: Negative for chest pain and palpitations  Gastrointestinal: Negative for abdominal pain, diarrhea, nausea and vomiting  Skin: Negative for rash  Neurological: Negative for dizziness, seizures, syncope, light-headedness and headaches  Psychiatric/Behavioral: Negative for suicidal ideas  As noted in HPI  Objective:     Physical Exam  Vitals reviewed  Constitutional:       General: She is not in acute distress  Appearance: She is obese  She is not ill-appearing or diaphoretic  HENT:      Right Ear: External ear normal       Left Ear: External ear normal       Mouth/Throat:      Comments: Patient is wearing a mask  Cardiovascular:      Rate and Rhythm: Normal rate and regular rhythm  Pulses: Normal pulses  Heart sounds: Normal heart sounds  Pulmonary:      Effort: Pulmonary effort is normal  No respiratory distress  Breath sounds: Normal breath sounds  No wheezing  Musculoskeletal:      Comments: Gait wnl  Skin:     Findings: No rash     Neurological:      Mental Status: She is alert and oriented to person, place, and time     Psychiatric:         Mood and Affect: Mood normal

## 2022-03-28 ENCOUNTER — HOSPITAL ENCOUNTER (OUTPATIENT)
Dept: MAMMOGRAPHY | Facility: HOSPITAL | Age: 62
Discharge: HOME | End: 2022-03-28
Payer: COMMERCIAL

## 2022-03-28 VITALS — HEIGHT: 64 IN | WEIGHT: 275.57 LBS | BODY MASS INDEX: 47.05 KG/M2

## 2022-03-28 DIAGNOSIS — Z12.31 ENCOUNTER FOR SCREENING MAMMOGRAM FOR BREAST CANCER: ICD-10-CM

## 2022-03-28 PROCEDURE — 77067 SCR MAMMO BI INCL CAD: CPT

## 2022-03-28 PROCEDURE — 77063 BREAST TOMOSYNTHESIS BI: CPT

## 2022-04-07 DIAGNOSIS — K74.69 OTHER CIRRHOSIS OF LIVER (HCC): ICD-10-CM

## 2022-04-07 RX ORDER — CARVEDILOL 12.5 MG/1
12.5 TABLET ORAL 2 TIMES DAILY WITH MEALS
Qty: 60 TABLET | Refills: 5 | Status: SHIPPED | OUTPATIENT
Start: 2022-04-07

## 2022-04-07 NOTE — TELEPHONE ENCOUNTER
Patient needs refill for Carvedilol 12 5 mg twice daily sent to [facility] mail order  90 day supply  Refill: 3    Patient was last seen 3/2/22 in office w/ Dr Casie Jo

## 2022-04-12 DIAGNOSIS — F41.9 ANXIETY: ICD-10-CM

## 2022-04-13 RX ORDER — ALPRAZOLAM 0.25 MG/1
0.25 TABLET ORAL
Qty: 30 TABLET | Refills: 0 | Status: SHIPPED | OUTPATIENT
Start: 2022-04-13 | End: 2022-07-31 | Stop reason: SDUPTHER

## 2022-05-05 ENCOUNTER — VBI (OUTPATIENT)
Dept: ADMINISTRATIVE | Facility: OTHER | Age: 62
End: 2022-05-05

## 2022-05-05 NOTE — TELEPHONE ENCOUNTER
05/05/22 7:59 AM     [facility]       Antler Contes

## 2022-06-06 ENCOUNTER — APPOINTMENT (OUTPATIENT)
Dept: LAB | Facility: CLINIC | Age: 62
End: 2022-06-06
Payer: COMMERCIAL

## 2022-06-06 DIAGNOSIS — K70.30 ALCOHOLIC CIRRHOSIS OF LIVER WITHOUT ASCITES (HCC): ICD-10-CM

## 2022-06-06 DIAGNOSIS — E78.2 MIXED HYPERLIPIDEMIA: ICD-10-CM

## 2022-06-06 DIAGNOSIS — I10 ESSENTIAL HYPERTENSION: ICD-10-CM

## 2022-06-06 DIAGNOSIS — E66.01 CLASS 3 SEVERE OBESITY DUE TO EXCESS CALORIES WITHOUT SERIOUS COMORBIDITY WITH BODY MASS INDEX (BMI) OF 45.0 TO 49.9 IN ADULT (HCC): ICD-10-CM

## 2022-06-06 DIAGNOSIS — E11.8 TYPE 2 DIABETES MELLITUS WITH COMPLICATION, WITHOUT LONG-TERM CURRENT USE OF INSULIN (HCC): ICD-10-CM

## 2022-06-06 LAB
AFP-TM SERPL-MCNC: 3.9 NG/ML (ref 0.5–8)
CHOLEST SERPL-MCNC: 132 MG/DL
EST. AVERAGE GLUCOSE BLD GHB EST-MCNC: 140 MG/DL
HBA1C MFR BLD: 6.5 %
HDLC SERPL-MCNC: 40 MG/DL
LDLC SERPL CALC-MCNC: 63 MG/DL (ref 0–100)
TRIGL SERPL-MCNC: 144 MG/DL
TSH SERPL DL<=0.05 MIU/L-ACNC: 2.88 UIU/ML (ref 0.45–4.5)

## 2022-06-06 PROCEDURE — 82105 ALPHA-FETOPROTEIN SERUM: CPT

## 2022-06-06 PROCEDURE — 36415 COLL VENOUS BLD VENIPUNCTURE: CPT

## 2022-06-06 PROCEDURE — 3044F HG A1C LEVEL LT 7.0%: CPT | Performed by: NURSE PRACTITIONER

## 2022-06-06 PROCEDURE — 80061 LIPID PANEL: CPT

## 2022-06-06 PROCEDURE — 83036 HEMOGLOBIN GLYCOSYLATED A1C: CPT

## 2022-06-06 PROCEDURE — 84443 ASSAY THYROID STIM HORMONE: CPT

## 2022-06-10 ENCOUNTER — OFFICE VISIT (OUTPATIENT)
Dept: GASTROENTEROLOGY | Facility: CLINIC | Age: 62
End: 2022-06-10
Payer: COMMERCIAL

## 2022-06-10 ENCOUNTER — TELEPHONE (OUTPATIENT)
Dept: GASTROENTEROLOGY | Facility: CLINIC | Age: 62
End: 2022-06-10

## 2022-06-10 VITALS
DIASTOLIC BLOOD PRESSURE: 97 MMHG | HEIGHT: 64 IN | HEART RATE: 72 BPM | BODY MASS INDEX: 46.61 KG/M2 | WEIGHT: 273 LBS | SYSTOLIC BLOOD PRESSURE: 130 MMHG

## 2022-06-10 DIAGNOSIS — K74.69 OTHER CIRRHOSIS OF LIVER (HCC): Primary | ICD-10-CM

## 2022-06-10 PROCEDURE — 99213 OFFICE O/P EST LOW 20 MIN: CPT | Performed by: INTERNAL MEDICINE

## 2022-06-10 NOTE — TELEPHONE ENCOUNTER
[facility] Assessment    Name: Ana Goff  YOB: 1960  Last Height: 5' 4" (1 626 m)  Last weight: 124 kg (273 lb)  BMI: 46 86 kg/m²  Procedure: Egd  Diagnosis: see order  Date of procedure: 6/23/22  Prep:   Responsible : yes  Phone#: [phone]  Name completing form: Adelaide Tuttle  Date form completed: 06/10/22      If the patient answers yes to any of these questions, schedule in a hospital  Are you pregnant: No  Do you rely on a wheelchair for mobility: No  Have you been diagnosed with End Stage Renal Disease (ESRD): No  Do you need oxygen during the day: No  Have you had a heart attack or stroke within the past three months: No  Have you had a seizure within the past three months: No  Have you ever been informed by anesthesia that you have a difficult airway: No  Additional Questions  Have you had any cardiac testing or are under the care of a Cardiologist (see cardiac list): No  Cardiac list:   Do you have an implanted cardiac defibrillator: No (Comment:  This patient should be scheduled in the hospital)    Have any bleeding problems, such as anemia or hemophilia (If patient has H&H result below 8, schedule in hospital   H&H must be within 30 days of procedure): No    Had an organ transplant within the past 3 months: No    Do you have any present infections: No  Do you get short of breath when walking a few blocks: No  Have you been diagnosed with diabetes: No  Comments (provide cardiac provider information if applicable):

## 2022-06-10 NOTE — PROGRESS NOTES
[facility] - Outpatient Follow-up Note  Eloy Silva 59 y o  female MRN: [id number]  Encounter: [id number]          ASSESSMENT AND PLAN:      1  Other cirrhosis of liver ([facility] )  [place] [address]  Due for abdominal ultrasound and endoscopy which will be arranged  Will continue to work toward weight loss and avoid alcohol  Will continue carvedilol for pre primary prophylaxis    - EGD; Future  - US right upper quadrant; Future    ______________________________________________________________________    SUBJECTIVE:  Patient returns in follow-up of cirrhosis likely multifactorial due to metabolic syndrome and alcohol  She has been completely abstinent  Feeling well with no complaints  Recent labs reviewed  REVIEW OF SYSTEMS:    Review of Systems   All other systems reviewed and are negative           Historical Information   Past Medical History:   Diagnosis Date    Diabetes mellitus ([place] [address] )     Hypertension      Past Surgical History:   Procedure Laterality Date    ACHILLES TENDON REPAIR Right     COLONOSCOPY      HYSTERECTOMY      age 34    UPPER GASTROINTESTINAL ENDOSCOPY       Social History   Social History     Substance and Sexual Activity   Alcohol Use No    Comment: Sober for 20 years     Social History     Substance and Sexual Activity   Drug Use No     Social History     Tobacco Use   Smoking Status Current Every Day Smoker    Packs/day: 0 25    Years: 40 00    Pack years: 10 00    Types: Cigarettes   Smokeless Tobacco Never Used     Family History   Problem Relation Age of Onset    Lung cancer Sister    Normie Glory Cancer Sister     Hypertension Mother     No Known Problems Father     No Known Problems Maternal Grandmother     No Known Problems Maternal Grandfather     No Known Problems Paternal Grandmother     No Known Problems Paternal Grandfather     No Known Problems Brother     No Known Problems Brother        Meds/Allergies       Current Outpatient Medications:    Accu-Chek Guide test strip    ALPRAZolam (XANAX) 0 25 mg tablet    atorvastatin (LIPITOR) 20 mg tablet    carvedilol (COREG) 12 5 mg tablet    lisinopril-hydrochlorothiazide (PRINZIDE,ZESTORETIC) 10-12 5 MG per tablet    metFORMIN (GLUCOPHAGE) 500 mg tablet    Allergies   Allergen Reactions    Penicillins            Objective     Blood pressure 130/97, pulse 72, height 5' 4" (1 626 m), weight 124 kg (273 lb)  Body mass index is 46 86 kg/m²  PHYSICAL EXAM:      Physical Exam  Vitals and nursing note reviewed  Constitutional:       General: She is not in acute distress  Appearance: She is obese  She is not ill-appearing  HENT:      Head: Normocephalic and atraumatic  Eyes:      General: No scleral icterus  Extraocular Movements: Extraocular movements intact  Cardiovascular:      Rate and Rhythm: Normal rate and regular rhythm  Pulmonary:      Effort: Pulmonary effort is normal  No respiratory distress  Abdominal:      General: There is no distension  Palpations: Abdomen is soft  Tenderness: There is no abdominal tenderness  There is no guarding or rebound  Musculoskeletal:      Right lower leg: No edema  Left lower leg: No edema  Skin:     General: Skin is warm and dry  Coloration: Skin is not cyanotic  Findings: No erythema  Neurological:      General: No focal deficit present  Mental Status: She is alert and oriented to person, place, and time  Psychiatric:         Mood and Affect: Mood normal          Behavior: Behavior normal           Lab Results:   No visits with results within 1 Day(s) from this visit     Latest known visit with results is:   Appointment on 06/06/2022   Component Date Value    AFP TUMOR MARKER 06/06/2022 3 9     Cholesterol 06/06/2022 132     Triglycerides 06/06/2022 144     HDL, Direct 06/06/2022 40 (A)    LDL Calculated 06/06/2022 63     Hemoglobin A1C 06/06/2022 6 5 (A)    EAG 06/06/2022 140     TSH 3RD Cheryle Davis 06/06/2022 2 880          Radiology Results:   No results found

## 2022-06-10 NOTE — TELEPHONE ENCOUNTER
Scheduled date of EGD(as of today): 6/23/22  Physician performing EGD: Dr Elizabeth Hastings  Location of EGD: [facility]  Instructions reviewed with patient by: tonja  Clearances: n/a

## 2022-06-20 ENCOUNTER — HOSPITAL ENCOUNTER (OUTPATIENT)
Dept: ULTRASOUND IMAGING | Facility: HOSPITAL | Age: 62
Discharge: HOME | End: 2022-06-20
Attending: INTERNAL MEDICINE
Payer: COMMERCIAL

## 2022-06-20 ENCOUNTER — OFFICE VISIT (OUTPATIENT)
Dept: FAMILY MEDICINE CLINIC | Facility: CLINIC | Age: 62
End: 2022-06-20
Payer: COMMERCIAL

## 2022-06-20 VITALS
BODY MASS INDEX: 46.78 KG/M2 | HEART RATE: 74 BPM | HEIGHT: 64 IN | SYSTOLIC BLOOD PRESSURE: 124 MMHG | DIASTOLIC BLOOD PRESSURE: 78 MMHG | RESPIRATION RATE: 16 BRPM | OXYGEN SATURATION: 97 % | WEIGHT: 274 LBS

## 2022-06-20 DIAGNOSIS — E78.2 MIXED HYPERLIPIDEMIA: ICD-10-CM

## 2022-06-20 DIAGNOSIS — F41.9 ANXIETY: ICD-10-CM

## 2022-06-20 DIAGNOSIS — E66.01 MORBID OBESITY (HCC): ICD-10-CM

## 2022-06-20 DIAGNOSIS — E11.69 DIABETES MELLITUS TYPE 2 IN OBESE (HCC): Primary | ICD-10-CM

## 2022-06-20 DIAGNOSIS — K74.69 OTHER CIRRHOSIS OF LIVER (HCC): ICD-10-CM

## 2022-06-20 DIAGNOSIS — E66.9 DIABETES MELLITUS TYPE 2 IN OBESE (HCC): Primary | ICD-10-CM

## 2022-06-20 PROCEDURE — 4004F PT TOBACCO SCREEN RCVD TLK: CPT | Performed by: NURSE PRACTITIONER

## 2022-06-20 PROCEDURE — 3074F SYST BP LT 130 MM HG: CPT | Performed by: NURSE PRACTITIONER

## 2022-06-20 PROCEDURE — 76705 ECHO EXAM OF ABDOMEN: CPT

## 2022-06-20 PROCEDURE — 99214 OFFICE O/P EST MOD 30 MIN: CPT | Performed by: NURSE PRACTITIONER

## 2022-06-20 PROCEDURE — 3008F BODY MASS INDEX DOCD: CPT | Performed by: NURSE PRACTITIONER

## 2022-06-20 PROCEDURE — 3078F DIAST BP <80 MM HG: CPT | Performed by: NURSE PRACTITIONER

## 2022-06-20 NOTE — PROGRESS NOTES
Assessment/Plan:      Diagnoses and all orders for this visit:    Diabetes mellitus type 2 in obese [facility])  -     Ambulatory Referral to Weight Management; Future  -     Comprehensive metabolic panel; Future  -     CBC and differential; Future  -     TSH, 3rd generation with Free T4 reflex; Future  -     Lipid Panel with Direct LDL reflex; Future  -     HEMOGLOBIN A1C W/ EAG ESTIMATION; Future    Hemoglobin A1c 6 5  Continue metformin as prescribed  Patient instructed to eat a healthy low fat/ diabetic diet  Other cirrhosis of liver ([facility] )  Continue to follow-up with gastroenterology  Mixed hyperlipidemia  -     Comprehensive metabolic panel; Future  -     CBC and differential; Future  -     TSH, 3rd generation with Free T4 reflex; Future  -     Lipid Panel with Direct LDL reflex; Future    Total cholesterol 132, LDL 63, and triglycerides 144  Patient instructed to eat a healthy low fat/diabetic diet  Continue atorvastatin daily  Anxiety  -     Comprehensive metabolic panel; Future  -     CBC and differential; Future  -     TSH, 3rd generation with Free T4 reflex; Future    Patient reports that she feels good  Denies any depression or suicidal thoughts  Patient reports that she rarely takes the xanax for anxiety  Continue xanax prn for anxiety  Morbid obesity ([facility] )  -     Ambulatory Referral to Weight Management; Future  -     Comprehensive metabolic panel; Future  -     CBC and differential; Future  -     TSH, 3rd generation with Free T4 reflex; Future  -     Lipid Panel with Direct LDL reflex; Future  -     HEMOGLOBIN A1C W/ EAG ESTIMATION; Future    Patient instructed to eat a healthy low fat/diabetic diet  Patient referred to weight management for follow-up  Reviewed lab results with the patient  Patient instructed to follow-up in 6 months or sooner prn  Subjective:     Patient ID: Evelia Valencia is a 59 y o  female      Patient is here for a follow-up for chronic medical conditions  Patient is here for a follow-up for type 2 DM  Patient takes metformin BID  Denies any Has, dizziness, nausea, or vomiting  Denies any vision changes  Patient follows up with gastroenterology for cirrhosis of the liver  Patient is here for a follow-up for anxiety  Patient reports that she feels good  Denies any depression or suicidal thoughts  Patient reports that she rarely takes the xanax for anxiety  Patient is here for a follow-up for hyperlipidemia  Patient reports that she tries to watch her diet  Patient reports that she takes atorvastatin daily  Review of Systems   Constitutional: Negative for chills and fever  HENT: Negative for congestion, ear pain, sinus pressure and sore throat  Respiratory: Negative for cough, chest tightness, shortness of breath and wheezing  Cardiovascular: Negative for chest pain and palpitations  Gastrointestinal: Negative for abdominal pain, diarrhea, nausea and vomiting  Genitourinary: Negative for dysuria, frequency, hematuria and urgency  Skin: Negative for rash  Neurological: Negative for dizziness, seizures, syncope, light-headedness and headaches  Psychiatric/Behavioral: Negative for suicidal ideas  As noted in HPI  Objective:     Physical Exam  Vitals reviewed  Constitutional:       General: She is not in acute distress  Appearance: She is obese  She is not ill-appearing or diaphoretic  HENT:      Right Ear: External ear normal       Left Ear: External ear normal       Nose: Nose normal       Mouth/Throat:      Mouth: Mucous membranes are moist       Pharynx: Oropharynx is clear  No posterior oropharyngeal erythema  Eyes:      Conjunctiva/sclera: Conjunctivae normal       Pupils: Pupils are equal, round, and reactive to light  Cardiovascular:      Rate and Rhythm: Normal rate and regular rhythm  Pulses: Normal pulses  Heart sounds: Normal heart sounds        Comments: No edema noted    Pulmonary:      Effort: Pulmonary effort is normal  No respiratory distress  Breath sounds: Normal breath sounds  No wheezing  Musculoskeletal:      Comments: Gait wnl  Skin:     Findings: No rash  Neurological:      Mental Status: She is alert and oriented to person, place, and time     Psychiatric:         Mood and Affect: Mood normal

## 2022-06-23 ENCOUNTER — HOSPITAL ENCOUNTER (OUTPATIENT)
Dept: GASTROENTEROLOGY | Facility: AMBULATORY SURGERY CENTER | Age: 62
Discharge: HOME | End: 2022-06-23
Payer: COMMERCIAL

## 2022-06-23 ENCOUNTER — ANESTHESIA EVENT (OUTPATIENT)
Dept: GASTROENTEROLOGY | Facility: AMBULATORY SURGERY CENTER | Age: 62
End: 2022-06-23

## 2022-06-23 ENCOUNTER — ANESTHESIA (OUTPATIENT)
Dept: GASTROENTEROLOGY | Facility: AMBULATORY SURGERY CENTER | Age: 62
End: 2022-06-23

## 2022-06-23 VITALS
WEIGHT: 273 LBS | HEART RATE: 66 BPM | SYSTOLIC BLOOD PRESSURE: 106 MMHG | HEIGHT: 64 IN | BODY MASS INDEX: 46.61 KG/M2 | RESPIRATION RATE: 18 BRPM | OXYGEN SATURATION: 95 % | TEMPERATURE: 95.6 F | DIASTOLIC BLOOD PRESSURE: 78 MMHG

## 2022-06-23 DIAGNOSIS — K74.69 OTHER CIRRHOSIS OF LIVER (HCC): ICD-10-CM

## 2022-06-23 DIAGNOSIS — K21.00 GASTROESOPHAGEAL REFLUX DISEASE WITH ESOPHAGITIS WITHOUT HEMORRHAGE: Primary | ICD-10-CM

## 2022-06-23 PROBLEM — F17.200 SMOKING: Status: ACTIVE | Noted: 2022-06-23

## 2022-06-23 PROBLEM — IMO0001 SMOKING: Status: ACTIVE | Noted: 2022-06-23

## 2022-06-23 PROCEDURE — 88305 TISSUE EXAM BY PATHOLOGIST: CPT | Performed by: PATHOLOGY

## 2022-06-23 PROCEDURE — 43239 EGD BIOPSY SINGLE/MULTIPLE: CPT | Performed by: INTERNAL MEDICINE

## 2022-06-23 RX ORDER — CETIRIZINE HYDROCHLORIDE 10 MG/1
10 TABLET ORAL DAILY
COMMUNITY

## 2022-06-23 RX ORDER — SODIUM CHLORIDE, SODIUM LACTATE, POTASSIUM CHLORIDE, CALCIUM CHLORIDE 600; 310; 30; 20 MG/100ML; MG/100ML; MG/100ML; MG/100ML
125 INJECTION, SOLUTION INTRAVENOUS CONTINUOUS
Status: DISCONTINUED | OUTPATIENT
Start: 2022-06-23 | End: 2022-06-27 | Stop reason: HOSPADM

## 2022-06-23 RX ORDER — PROPOFOL 10 MG/ML
INJECTION, EMULSION INTRAVENOUS AS NEEDED
Status: DISCONTINUED | OUTPATIENT
Start: 2022-06-23 | End: 2022-06-23

## 2022-06-23 RX ORDER — SODIUM CHLORIDE, SODIUM LACTATE, POTASSIUM CHLORIDE, CALCIUM CHLORIDE 600; 310; 30; 20 MG/100ML; MG/100ML; MG/100ML; MG/100ML
20 INJECTION, SOLUTION INTRAVENOUS CONTINUOUS
Status: DISCONTINUED | OUTPATIENT
Start: 2022-06-23 | End: 2022-06-27 | Stop reason: HOSPADM

## 2022-06-23 RX ORDER — LIDOCAINE HYDROCHLORIDE 10 MG/ML
INJECTION, SOLUTION EPIDURAL; INFILTRATION; INTRACAUDAL; PERINEURAL AS NEEDED
Status: DISCONTINUED | OUTPATIENT
Start: 2022-06-23 | End: 2022-06-23

## 2022-06-23 RX ORDER — FAMOTIDINE 20 MG/1
20 TABLET, FILM COATED ORAL 2 TIMES DAILY
Qty: 180 TABLET | Refills: 1 | Status: SHIPPED | OUTPATIENT
Start: 2022-06-23

## 2022-06-23 RX ORDER — SODIUM CHLORIDE, SODIUM LACTATE, POTASSIUM CHLORIDE, CALCIUM CHLORIDE 600; 310; 30; 20 MG/100ML; MG/100ML; MG/100ML; MG/100ML
INJECTION, SOLUTION INTRAVENOUS CONTINUOUS PRN
Status: DISCONTINUED | OUTPATIENT
Start: 2022-06-23 | End: 2022-06-23

## 2022-06-23 RX ADMIN — PROPOFOL 20 MG: 10 INJECTION, EMULSION INTRAVENOUS at 08:33

## 2022-06-23 RX ADMIN — PROPOFOL 100 MG: 10 INJECTION, EMULSION INTRAVENOUS at 08:30

## 2022-06-23 RX ADMIN — PROPOFOL 40 MG: 10 INJECTION, EMULSION INTRAVENOUS at 08:32

## 2022-06-23 RX ADMIN — SODIUM CHLORIDE, SODIUM LACTATE, POTASSIUM CHLORIDE, CALCIUM CHLORIDE: 600; 310; 30; 20 INJECTION, SOLUTION INTRAVENOUS at 08:24

## 2022-06-23 RX ADMIN — LIDOCAINE HYDROCHLORIDE 50 MG: 10 INJECTION, SOLUTION EPIDURAL; INFILTRATION; INTRACAUDAL; PERINEURAL at 08:30

## 2022-06-23 NOTE — ANESTHESIA POSTPROCEDURE EVALUATION
Post-Op Assessment Note    CV Status:  Stable  Pain Score: 0    Pain management: adequate     Mental Status:  Arousable   Hydration Status:  Euvolemic   PONV Controlled:  Controlled   Airway Patency:  Patent      Post Op Vitals Reviewed: Yes      Staff: Anesthesiologist, CRNA         No complications documented      BP   132/68   Temp     Pulse  69   Resp   14   SpO2   96

## 2022-06-23 NOTE — H&P
History and Physical - [facility]  Los Vallejo 59 y o  female MRN: [id number]                  HPI: Los Vallejo is a 59y o  year old female who presents for screening for esophageal varices      REVIEW OF SYSTEMS: Per the HPI, and otherwise unremarkable      Historical Information   Past Medical History:   Diagnosis Date    [facility]osis ([facility] )     Diabetes mellitus ([facility] )     GERD (gastroesophageal reflux disease)     Hypertension     Seasonal allergies      Past Surgical History:   Procedure Laterality Date    ACHILLES TENDON REPAIR Right     COLONOSCOPY      HYSTERECTOMY      age 34    UPPER GASTROINTESTINAL ENDOSCOPY       Social History   Social History     Substance and Sexual Activity   Alcohol Use No    Comment: Sober for 20 years     Social History     Substance and Sexual Activity   Drug Use No     Social History     Tobacco Use   Smoking Status Current Every Day Smoker    Packs/day: 0 25    Years: 40 00    Pack years: 10 00    Types: Cigarettes   Smokeless Tobacco Never Used     Family History   Problem Relation Age of Onset    Lung cancer Sister     Cancer Sister     Hypertension Mother     No Known Problems Father     No Known Problems Maternal Grandmother     No Known Problems Maternal Grandfather     No Known Problems Paternal Grandmother     No Known Problems Paternal Grandfather     No Known Problems Brother     No Known Problems Brother        Meds/Allergies       Current Outpatient Medications:     ALPRAZolam (XANAX) 0 25 mg tablet    atorvastatin (LIPITOR) 20 mg tablet    carvedilol (COREG) 12 5 mg tablet    cetirizine (ZyrTEC) 10 mg tablet    famotidine (PEPCID) 20 mg tablet    lisinopril-hydrochlorothiazide (PRINZIDE,ZESTORETIC) 10-12 5 MG per tablet    metFORMIN (GLUCOPHAGE) 500 mg tablet    Accu-Chek Guide test strip    Current Facility-Administered Medications:     lactated ringers infusion, 20 mL/hr, Intravenous, Continuous   lactated ringers infusion, 125 mL/hr, Intravenous, Continuous    Allergies   Allergen Reactions    Penicillins        Objective     /78   Pulse 66   Temp (!) 95 6 °F (35 3 °C)   Resp 18   Ht 5' 4" (1 626 m)   Wt 124 kg (273 lb)   SpO2 95%   BMI 46 86 kg/m²       PHYSICAL EXAM    Gen: NAD  Head: NCAT  CV: RRR  CHEST: Clear  ABD: soft, NT/ND  EXT: no edema      ASSESSMENT/PLAN:  This is a 59y o  year old female here for EGD, and she is stable and optimized for her procedure

## 2022-06-23 NOTE — ANESTHESIA PREPROCEDURE EVALUATION
Procedure:  EGD    Relevant Problems   ANESTHESIA (within normal limits)      CARDIO   (+) Essential hypertension   (+) Mixed hyperlipidemia      ENDO   (+) Type 2 diabetes mellitus with complication, without long-term current use of insulin (HCC)      GI/HEPATIC   (+) Gastroesophageal reflux disease with esophagitis without hemorrhage   (+) Other cirrhosis of liver (HCC)      HEMATOLOGY   (+) Thrombocytopenia, unspecified (HCC)      MUSCULOSKELETAL   (+) Sciatica of left side      NEURO/PSYCH   (+) Anxiety      PULMONARY   (+) Smoking      Digestive   (+) Diverticulosis      Other   (+) Class 3 severe obesity due to excess calories without serious comorbidity with body mass index (BMI) of 45 0 to 49 9 in adult (HCC)   (+) Tobacco use        Physical Exam    Airway    Mallampati score: II  TM Distance: >3 FB       Dental   No notable dental hx     Cardiovascular      Pulmonary      Other Findings        Anesthesia Plan  ASA Score- 3     Anesthesia Type- IV sedation with anesthesia with ASA Monitors  Additional Monitors:   Airway Plan:           Plan Factors-Exercise tolerance (METS): >4 METS  Chart reviewed  EKG reviewed  Existing labs reviewed  Patient summary reviewed  Patient is a current smoker  Patient did not smoke on day of surgery  Induction-     Postoperative Plan-     Informed Consent- Anesthetic plan and risks discussed with patient  I personally reviewed this patient with the CRNA  Discussed and agreed on the Anesthesia Plan with the CRNA  Kimberly Wills

## 2022-07-05 ENCOUNTER — VBI (OUTPATIENT)
Dept: ADMINISTRATIVE | Facility: OTHER | Age: 62
End: 2022-07-05

## 2022-07-31 DIAGNOSIS — F41.9 ANXIETY: ICD-10-CM

## 2022-08-01 RX ORDER — ALPRAZOLAM 0.25 MG/1
0.25 TABLET ORAL
Qty: 30 TABLET | Refills: 0 | Status: SHIPPED | OUTPATIENT
Start: 2022-08-01

## 2022-08-11 NOTE — RESULT ENCOUNTER NOTE
Please call the patient regarding her result  Stomach biopsies were unremarkable  Repeat EGD in 1 year    Schedule appointment to see me in the office

## 2022-08-24 ENCOUNTER — VBI (OUTPATIENT)
Dept: ADMINISTRATIVE | Facility: OTHER | Age: 62
End: 2022-08-24

## 2022-09-25 DIAGNOSIS — I10 ESSENTIAL HYPERTENSION: ICD-10-CM

## 2022-09-25 DIAGNOSIS — E78.2 MIXED HYPERLIPIDEMIA: ICD-10-CM

## 2022-09-26 RX ORDER — LISINOPRIL AND HYDROCHLOROTHIAZIDE 12.5; 1 MG/1; MG/1
1 TABLET ORAL DAILY
Qty: 90 TABLET | Refills: 1 | Status: SHIPPED | OUTPATIENT
Start: 2022-09-26

## 2022-09-26 RX ORDER — ATORVASTATIN CALCIUM 20 MG/1
TABLET, FILM COATED ORAL
Qty: 90 TABLET | Refills: 1 | Status: SHIPPED | OUTPATIENT
Start: 2022-09-26

## 2022-09-29 ENCOUNTER — OFFICE VISIT (OUTPATIENT)
Dept: DIABETES SERVICES | Facility: CLINIC | Age: 62
End: 2022-09-29
Payer: COMMERCIAL

## 2022-09-29 VITALS — WEIGHT: 272 LBS | BODY MASS INDEX: 46.69 KG/M2

## 2022-09-29 DIAGNOSIS — E11.69 DIABETES MELLITUS TYPE 2 IN OBESE (HCC): Primary | ICD-10-CM

## 2022-09-29 DIAGNOSIS — E66.9 DIABETES MELLITUS TYPE 2 IN OBESE (HCC): Primary | ICD-10-CM

## 2022-09-29 PROCEDURE — 97802 MEDICAL NUTRITION INDIV IN: CPT

## 2022-09-29 NOTE — PATIENT INSTRUCTIONS
Consume 3 balanced meals/day at appropriate times  30 grams carbohydrate/meal and 15 grams carbohydrate/snack  Consume 1 balanced snack/day at appropriate time

## 2022-09-29 NOTE — PROGRESS NOTES
Medical Nutrition Therapy        Assessment    Visit Type: Initial visit  Chief complaint/Medical Diagnosis/reason for visit E11 69  Diabetes Mellitus type 2 in obese    HPI Loni Cooley came in today for her initial nutrition visit  Loni Cooley stated she would like to control her blood sugars better  Loni Cooley stated she has not been eating right since her  passed away 1 1/2 years ago  But Loni Cooley stated she would like to get back on track now  Problems identified in food recall include inconsistent carbohydrate intake, unbalanced meals, going too long without eating, and irregular timing of meals/snacks  Provided patient with a 1500 calorie balanced individualized meal plan to assist with consistency, balance and portion control  Encouraged the consumption of regular meals and snack at appropriate times  Advised patient to keep carbohydrate intake to 30 grams per meal and 15 grams per snack to assist with glycemic control  My Plate, food models, portion booklet and food labels were used to teach basic carbohydrate counting  Patient agreed to keep daily food logs and return them in six weeks at her nutrition follow up appointment for assessment  RD will remain available for further dietary questions/concerns  Ht Readings from Last 1 Encounters:   06/23/22 5' 4" (1 626 m)     Wt Readings from Last 3 Encounters:   09/29/22 123 kg (272 lb)   06/23/22 124 kg (273 lb)   06/20/22 124 kg (274 lb)     Weight Change: Yes Lost 1 lb since last encounter      Barriers to Learning: no barriers    Do you follow any special diet presently?: No  Who shops: patient  Who cooks: patient    Food Log: Completed via the method of food recall    Breakfast:8AM - 2 sl toast with butter, coffee with Splenda  Morning Snack:None  Lunch:None  Afternoon Snack: None  Dinner:3PM - 1/2 subway, or ham + bean soup, or meatloaf with mashed potatoes + corn, water or diet ice tea  Evening Snack:9PM - Corn chips of crackers, diet ice tea  Beverages: coffee, water, diet ice tea  Eating out/Take out:occasionally  Exercise Activities of daily living    Calorie needs 1500kcals/day  Carbs: 30g/meal, 15g/snack         Nutrition Diagnosis:  Food and nutrition related knowledge deficit  related to Lack of prior exposure to accurate nutrition related information as evidenced by Verbalizes inaccurate or incomplete information    Intervention: plate method, label reading, carbohydrate counting, meal timing, meal planning, individualized meal plan, monitoring portion control and food diary     Treatment Goals: Patient will consume 3 meals a day, Patient will consume snacks and Patient will count carbohydrates    Monitoring and evaluation:    Term code indicator  FH 1 3 2 Food Intake Criteria: Consume 3 balanced meals/day at appropriate times  Term code indicator  FH 1 6 3 Carbohydrate Intake Criteria: 30 grams carbohydrate/meal and 15 grams carbohydrate/snack  Term code indicator  FH 1 3 2 Food Intake Criteria: Consume 1 balanced snack/day at appropriate time  Materials Provided: CHO counting, portion booklet, food logs, individualized meal plan    Patients Response to Instruction:  Comprehensiongood  Motivationgood  Expected Compliancegood    Begin Time: 1:25PM  End Time: 2:25PM  Referring Provider: Gurpreet Lemons    Thank you for coming to the [facility] for education today  Please feel free to call with any questions or concerns      Cynthia Duarte  [address] [place] [id number] [phone]

## 2022-09-30 ENCOUNTER — OFFICE VISIT (OUTPATIENT)
Dept: GASTROENTEROLOGY | Facility: CLINIC | Age: 62
End: 2022-09-30
Payer: COMMERCIAL

## 2022-09-30 VITALS
WEIGHT: 270.8 LBS | HEIGHT: 64 IN | HEART RATE: 69 BPM | DIASTOLIC BLOOD PRESSURE: 91 MMHG | SYSTOLIC BLOOD PRESSURE: 150 MMHG | BODY MASS INDEX: 46.23 KG/M2

## 2022-09-30 DIAGNOSIS — K74.69 OTHER CIRRHOSIS OF LIVER (HCC): Primary | ICD-10-CM

## 2022-09-30 DIAGNOSIS — I10 ESSENTIAL HYPERTENSION: ICD-10-CM

## 2022-09-30 PROCEDURE — 99213 OFFICE O/P EST LOW 20 MIN: CPT | Performed by: INTERNAL MEDICINE

## 2022-09-30 NOTE — PROGRESS NOTES
[facility] - Outpatient Follow-up Note  Yudith Perez 59 y o  female MRN: [id number]  Encounter: [id number]          ASSESSMENT AND PLAN:      1  Other cirrhosis of liver (HCC)  No evidence varices on EGD  Will repeat in 1 year  Due for blood work and abdominal ultrasound  - US right upper quadrant; Future    2  Esophagitis  Mild esophagitis noted on EGD  Started treatment with famotidine but has had increased anxiety since starting this and wonders if it is related  Will have her discontinue this and hold off on further treatment unless symptomatic     3  Essential hypertension  Did not take her antihypertensives this morning  Will continue lisinopril/hydrochlorothiazide and carvedilol    ______________________________________________________________________    SUBJECTIVE:  Returns follow-up of cirrhosis likely secondary to steatohepatitis  Stable with no complaints at this time  No abdominal pain, nausea vomiting, fever chills, jaundice or rashes, bruising or bleeding, forgetfulness or confusion  REVIEW OF SYSTEMS:Answers for HPI/ROS submitted by the patient on 9/28/2022  belching: No        Review of Systems   Constitutional: Negative for fever and weight loss  Musculoskeletal: Negative for myalgias  Gastrointestinal: Positive for abdominal pain  Negative for anorexia, flatus, hematochezia, melena, nausea and vomiting  Genitourinary: Negative for dysuria, frequency and hematuria  Neurological: Negative for headaches            Historical Information   Past Medical History:   Diagnosis Date    Cirrhosis ([facility] )     Diabetes mellitus ([facility] )     GERD (gastroesophageal reflux disease)     Hypertension     Seasonal allergies      Past Surgical History:   Procedure Laterality Date    ACHILLES TENDON REPAIR Right     COLONOSCOPY      HYSTERECTOMY      age 34    UPPER GASTROINTESTINAL ENDOSCOPY       Social History   Social History     Substance and Sexual Activity   Alcohol Use No    Comment: Sober for 20 years     Social History     Substance and Sexual Activity   Drug Use No     Social History     Tobacco Use   Smoking Status Current Every Day Smoker    Packs/day: 0 25    Years: 40 00    Pack years: 10 00    Types: Cigarettes   Smokeless Tobacco Never Used     Family History   Problem Relation Age of Onset    Lung cancer Sister     Cancer Sister     Hypertension Mother     No Known Problems Father     No Known Problems Maternal Grandmother     No Known Problems Maternal Grandfather     No Known Problems Paternal Grandmother     No Known Problems Paternal Grandfather     No Known Problems Brother     No Known Problems Brother        Meds/Allergies       Current Outpatient Medications:     Accu-Chek Guide test strip    ALPRAZolam (XANAX) 0 25 mg tablet    atorvastatin (LIPITOR) 20 mg tablet    carvedilol (COREG) 12 5 mg tablet    cetirizine (ZyrTEC) 10 mg tablet    lisinopril-hydrochlorothiazide (PRINZIDE,ZESTORETIC) 10-12 5 MG per tablet    metFORMIN (GLUCOPHAGE) 500 mg tablet    Allergies   Allergen Reactions    Penicillins            Objective     Blood pressure 150/91, pulse 69, height 5' 4" (1 626 m), weight 123 kg (270 lb 12 8 oz)  Body mass index is 46 48 kg/m²  PHYSICAL EXAM:      Physical Exam  Vitals and nursing note reviewed  Constitutional:       General: She is not in acute distress  Appearance: She is obese  She is not ill-appearing  HENT:      Head: Normocephalic and atraumatic  Eyes:      General: No scleral icterus  Extraocular Movements: Extraocular movements intact  Cardiovascular:      Rate and Rhythm: Normal rate and regular rhythm  Pulmonary:      Effort: Pulmonary effort is normal  No respiratory distress  Abdominal:      General: There is no distension  Palpations: Abdomen is soft  Tenderness: There is no abdominal tenderness  There is no guarding or rebound     Musculoskeletal: Right lower leg: No edema  Left lower leg: No edema  Skin:     General: Skin is warm and dry  Coloration: Skin is not cyanotic  Findings: No erythema  Neurological:      General: No focal deficit present  Mental Status: She is alert and oriented to person, place, and time  Psychiatric:         Mood and Affect: Mood normal          Behavior: Behavior normal           Lab Results:   No visits with results within 1 Day(s) from this visit  Latest known visit with results is:   Hospital Outpatient Visit on 06/23/2022   Component Date Value    Case Report 06/23/2022                      Value:Surgical Pathology Report                         Case: [id number]                                   Authorizing Provider:  Luz Thompson MD         Collected:           06/23/2022 0834              Ordering Location:     [address] [place] [address] Received:            06/23/2022 [address] [place] [address]                                                                  Pathologist:           Avel Martinez                                                                 Specimen:    Stomach, cold bx antrum eval for hpy                                                       Final Diagnosis 06/23/2022                      Value: This result contains rich text formatting which cannot be displayed here   Additional Information 06/23/2022                      Value: This result contains rich text formatting which cannot be displayed here  Gabe Higgins Description 06/23/2022                      Value: This result contains rich text formatting which cannot be displayed here  Radiology Results:   No results found

## 2022-10-11 DIAGNOSIS — K74.69 OTHER CIRRHOSIS OF LIVER (HCC): ICD-10-CM

## 2022-10-11 RX ORDER — CARVEDILOL 12.5 MG/1
TABLET ORAL
Qty: 60 TABLET | Refills: 5 | Status: SHIPPED | OUTPATIENT
Start: 2022-10-11

## 2022-10-22 DIAGNOSIS — E66.9 DIABETES MELLITUS TYPE 2 IN OBESE (HCC): ICD-10-CM

## 2022-10-22 DIAGNOSIS — E11.69 DIABETES MELLITUS TYPE 2 IN OBESE (HCC): ICD-10-CM

## 2022-10-30 DIAGNOSIS — F41.9 ANXIETY: ICD-10-CM

## 2022-10-31 RX ORDER — ALPRAZOLAM 0.25 MG/1
0.25 TABLET ORAL
Qty: 30 TABLET | Refills: 0 | Status: SHIPPED | OUTPATIENT
Start: 2022-10-31

## 2022-11-03 ENCOUNTER — VBI (OUTPATIENT)
Dept: ADMINISTRATIVE | Facility: OTHER | Age: 62
End: 2022-11-03

## 2022-11-04 ENCOUNTER — TELEPHONE (OUTPATIENT)
Dept: DIABETES SERVICES | Facility: CLINIC | Age: 62
End: 2022-11-04

## 2022-12-07 ENCOUNTER — HOSPITAL ENCOUNTER (OUTPATIENT)
Dept: ULTRASOUND IMAGING | Facility: HOSPITAL | Age: 62
Discharge: HOME | End: 2022-12-07
Attending: INTERNAL MEDICINE

## 2022-12-07 ENCOUNTER — APPOINTMENT (OUTPATIENT)
Dept: LAB | Facility: HOSPITAL | Age: 62
End: 2022-12-07

## 2022-12-07 DIAGNOSIS — K74.69 OTHER CIRRHOSIS OF LIVER (HCC): ICD-10-CM

## 2022-12-07 DIAGNOSIS — E78.2 MIXED HYPERLIPIDEMIA: ICD-10-CM

## 2022-12-07 DIAGNOSIS — E66.9 DIABETES MELLITUS TYPE 2 IN OBESE (HCC): ICD-10-CM

## 2022-12-07 DIAGNOSIS — F41.9 ANXIETY: ICD-10-CM

## 2022-12-07 DIAGNOSIS — E11.69 DIABETES MELLITUS TYPE 2 IN OBESE (HCC): ICD-10-CM

## 2022-12-07 DIAGNOSIS — E66.01 MORBID OBESITY (HCC): ICD-10-CM

## 2022-12-07 LAB
BASOPHILS # BLD AUTO: 0.03 THOUSANDS/ÂΜL (ref 0–0.1)
BASOPHILS NFR BLD AUTO: 1 % (ref 0–1)
CHOLEST SERPL-MCNC: 119 MG/DL
EOSINOPHIL # BLD AUTO: 0.13 THOUSAND/ÂΜL (ref 0–0.61)
EOSINOPHIL NFR BLD AUTO: 3 % (ref 0–6)
ERYTHROCYTE [DISTWIDTH] IN BLOOD BY AUTOMATED COUNT: 12.7 % (ref 11.6–15.1)
EST. AVERAGE GLUCOSE BLD GHB EST-MCNC: 131 MG/DL
HBA1C MFR BLD: 6.2 %
HCT VFR BLD AUTO: 46.6 % (ref 34.8–46.1)
HDLC SERPL-MCNC: 40 MG/DL
HGB BLD-MCNC: 15.4 G/DL (ref 11.5–15.4)
IMM GRANULOCYTES # BLD AUTO: 0.02 THOUSAND/UL (ref 0–0.2)
IMM GRANULOCYTES NFR BLD AUTO: 0 % (ref 0–2)
LDLC SERPL CALC-MCNC: 59 MG/DL (ref 0–100)
LYMPHOCYTES # BLD AUTO: 1.26 THOUSANDS/ÂΜL (ref 0.6–4.47)
LYMPHOCYTES NFR BLD AUTO: 27 % (ref 14–44)
MCH RBC QN AUTO: 30.8 PG (ref 26.8–34.3)
MCHC RBC AUTO-ENTMCNC: 33 G/DL (ref 31.4–37.4)
MCV RBC AUTO: 93 FL (ref 82–98)
MONOCYTES # BLD AUTO: 0.26 THOUSAND/ÂΜL (ref 0.17–1.22)
MONOCYTES NFR BLD AUTO: 6 % (ref 4–12)
NEUTROPHILS # BLD AUTO: 2.93 THOUSANDS/ÂΜL (ref 1.85–7.62)
NEUTS SEG NFR BLD AUTO: 63 % (ref 43–75)
NRBC BLD AUTO-RTO: 0 /100 WBCS
PLATELET # BLD AUTO: 142 THOUSANDS/UL (ref 149–390)
PMV BLD AUTO: 10.6 FL (ref 8.9–12.7)
RBC # BLD AUTO: 5 MILLION/UL (ref 3.81–5.12)
TRIGL SERPL-MCNC: 99 MG/DL
TSH SERPL DL<=0.05 MIU/L-ACNC: 3.06 UIU/ML (ref 0.45–4.5)
WBC # BLD AUTO: 4.63 THOUSAND/UL (ref 4.31–10.16)

## 2022-12-14 DIAGNOSIS — K74.69 OTHER CIRRHOSIS OF LIVER (HCC): Primary | ICD-10-CM

## 2022-12-20 ENCOUNTER — VBI (OUTPATIENT)
Dept: ADMINISTRATIVE | Facility: OTHER | Age: 62
End: 2022-12-20

## 2022-12-26 DIAGNOSIS — F41.9 ANXIETY: ICD-10-CM

## 2022-12-28 ENCOUNTER — OFFICE VISIT (OUTPATIENT)
Dept: FAMILY MEDICINE CLINIC | Facility: CLINIC | Age: 62
End: 2022-12-28

## 2022-12-28 VITALS
DIASTOLIC BLOOD PRESSURE: 80 MMHG | HEIGHT: 64 IN | OXYGEN SATURATION: 98 % | WEIGHT: 271 LBS | BODY MASS INDEX: 46.26 KG/M2 | RESPIRATION RATE: 16 BRPM | HEART RATE: 79 BPM | SYSTOLIC BLOOD PRESSURE: 136 MMHG

## 2022-12-28 DIAGNOSIS — E11.8 TYPE 2 DIABETES MELLITUS WITH COMPLICATION, WITHOUT LONG-TERM CURRENT USE OF INSULIN (HCC): ICD-10-CM

## 2022-12-28 DIAGNOSIS — E78.2 MIXED HYPERLIPIDEMIA: ICD-10-CM

## 2022-12-28 DIAGNOSIS — H93.8X1 SENSATION OF FULLNESS IN RIGHT EAR: Primary | ICD-10-CM

## 2022-12-28 DIAGNOSIS — K74.69 OTHER CIRRHOSIS OF LIVER (HCC): ICD-10-CM

## 2022-12-28 DIAGNOSIS — E66.01 MORBID OBESITY (HCC): ICD-10-CM

## 2022-12-28 DIAGNOSIS — L53.9 DEPENDENT RUBOR: ICD-10-CM

## 2022-12-28 DIAGNOSIS — B35.1 ONYCHOMYCOSIS: ICD-10-CM

## 2022-12-28 DIAGNOSIS — I10 ESSENTIAL HYPERTENSION: ICD-10-CM

## 2022-12-28 RX ORDER — ALPRAZOLAM 0.25 MG/1
0.25 TABLET ORAL
Qty: 30 TABLET | Refills: 0 | Status: SHIPPED | OUTPATIENT
Start: 2022-12-28

## 2022-12-28 NOTE — PROGRESS NOTES
Name: Denys Brown      : 1960      MRN: [id number]  Encounter Provider: SHAWNA Ruiz  Encounter Date: 2022   Encounter department: [facility]     1  Sensation of fullness in right ear  -     Ambulatory Referral to Otolaryngology; Future    Patient reports that her right ear has felt clogged off and on for years  No signs of infection noted  Patient referred to ENT for further evaluation  2  Onychomycosis  -     Ambulatory Referral to Podiatry; Future    Toenails are thickened and ragged  Patient referred to podiatry for follow-up  3  Dependent rubor  -     VAS lower limb arterial duplex, complete bilateral; Future; Expected date: 2022    Dependent rubor noted during foot exam    Pedal pulses are +1 bilaterally  Denies any pain or numbness  Arterial doppler ordered  Will follow-up results with the patient  4  Type 2 diabetes mellitus with complication, without long-term current use of insulin ([address] )  -     Diabetic foot exam; Future  -     Comprehensive metabolic panel; Future; Expected date: 2023  -     CBC and differential; Future; Expected date: 2023  -     TSH, 3rd generation with Free T4 reflex; Future; Expected date: 2023  -     Lipid Panel with Direct LDL reflex; Future; Expected date: 2023  -     HEMOGLOBIN A1C W/ EAG ESTIMATION; Future; Expected date: 2023  -     Microalbumin / creatinine urine ratio; Future; Expected date: 2023    Hemoglobin A1c 6 2  Continue Metformin as prescribed  Patient instructed to eat a healthy diabetic diet  5  Essential hypertension  -     Comprehensive metabolic panel; Future; Expected date: 2023  -     CBC and differential; Future; Expected date: 2023  -     TSH, 3rd generation with Free T4 reflex; Future; Expected date: 2023  -     Lipid Panel with Direct LDL reflex;  Future; Expected date: 2023    BP 136/80  Continue current medications  6  Other cirrhosis of liver (HCC)  Continue to follow-up with gastroenterology  7  Mixed hyperlipidemia  -     Comprehensive metabolic panel; Future; Expected date: 06/28/2023  -     CBC and differential; Future; Expected date: 06/28/2023  -     TSH, 3rd generation with Free T4 reflex; Future; Expected date: 06/28/2023  -     Lipid Panel with Direct LDL reflex; Future; Expected date: 06/28/2023  -     HEMOGLOBIN A1C W/ EAG ESTIMATION; Future; Expected date: 06/28/2023    Total cholesterol 119,  LDL 59  Continue atorvastatin 20mg daily  8  Morbid obesity ([facility] )  -     Comprehensive metabolic panel; Future; Expected date: 06/28/2023  -     CBC and differential; Future; Expected date: 06/28/2023  -     TSH, 3rd generation with Free T4 reflex; Future; Expected date: 06/28/2023  -     Lipid Panel with Direct LDL reflex; Future; Expected date: 06/28/2023  -     HEMOGLOBIN A1C W/ EAG ESTIMATION; Future; Expected date: 06/28/2023    Patient instructed to eat a healthy diabetic diet  Lab results reviewed with the patient  Patient instructed to follow-up in 6 months or sooner prn  Subjective      Patient is here for a follow-up for chronic medical conditions  Patient reports that she feels well  Patient takes metformin for DM 2  Patient reports that she did the diabetes education  Patient reports that she tries to eat a healthy diet  Denies any Has, dizziness, nausea, or vomiting  Patient is here for a follow-up for HTN  Patient reports that she takes her medications as prescribed  Denies any chest pain, SOB, or palpitations  Patient follows up with gastroenterology for liver cirrhosis  Patient reports that her right ear feels clogged off and on for years  Denies any ear pain, ear drainage, or fever  Patient reports decreased hearing  Patient takes atorvastatin for hyperlipidemia  Patient takes xanax prn for anxiety and it helps   Denies any depression or suicidal thoughts  Review of Systems   Constitutional: Negative for chills, fatigue and fever  HENT: Negative for congestion, sinus pressure and sore throat  Eyes: Negative for pain, discharge and redness  Respiratory: Negative for cough, chest tightness, shortness of breath and wheezing  Cardiovascular: Negative for chest pain and palpitations  Gastrointestinal: Negative for abdominal pain, blood in stool, diarrhea, nausea and vomiting  Genitourinary: Negative for dysuria, frequency and hematuria  Musculoskeletal: Negative for myalgias  Skin: Negative for rash  Neurological: Negative for dizziness, seizures, syncope, light-headedness and headaches  Psychiatric/Behavioral: Negative for suicidal ideas  Denies any depression  Current Outpatient Medications on File Prior to Visit   Medication Sig   • Accu-Chek Guide test strip USE TO CHECK BLOOD SUGAR ONCE DAILY AS DIRECTED   • ALPRAZolam (XANAX) 0 25 mg tablet Take 1 tablet (0 25 mg total) by mouth daily at bedtime as needed for anxiety   • atorvastatin (LIPITOR) 20 mg tablet TAKE 1 TABLET BY MOUTH DAILY   • carvedilol (COREG) 12 5 mg tablet TAKE ONE TABLET BY MOUTH TWICE A DAY WITH MEALS   • cetirizine (ZyrTEC) 10 mg tablet Take 10 mg by mouth daily   • lisinopril-hydrochlorothiazide (PRINZIDE,ZESTORETIC) 10-12 5 MG per tablet TAKE 1 TABLET BY MOUTH DAILY   • metFORMIN (GLUCOPHAGE) 500 mg tablet TAKE 1 TABLET BY MOUTH TWICE A DAY WITH MEALS       Objective     /80 (BP Location: Left arm, Patient Position: Sitting, Cuff Size: Large)   Pulse 79   Resp 16   Ht 5' 4" (1 626 m)   Wt 123 kg (271 lb)   SpO2 98%   BMI 46 52 kg/m²     Physical Exam  Vitals reviewed  Constitutional:       General: She is not in acute distress  Appearance: She is obese  She is not ill-appearing or diaphoretic     HENT:      Right Ear: Tympanic membrane, ear canal and external ear normal       Left Ear: Tympanic membrane, ear canal and external ear normal       Nose: Nose normal       Mouth/Throat:      Mouth: Mucous membranes are moist       Pharynx: Oropharynx is clear  No posterior oropharyngeal erythema  Eyes:      Conjunctiva/sclera: Conjunctivae normal       Pupils: Pupils are equal, round, and reactive to light  Cardiovascular:      Rate and Rhythm: Normal rate and regular rhythm  Pulses: Pulses are weak  Dorsalis pedis pulses are 1+ on the right side and 1+ on the left side  Heart sounds: Normal heart sounds  Comments: Dependent rubor noted bilaterally on exam    Pulmonary:      Effort: Pulmonary effort is normal  No respiratory distress  Breath sounds: Normal breath sounds  No wheezing  Musculoskeletal:        Feet:       Comments: Gait wnl  Feet:      Right foot:      Skin integrity: Erythema present  No ulcer, skin breakdown, warmth, callus or dry skin  Left foot:      Skin integrity: Erythema present  No ulcer, skin breakdown, warmth, callus or dry skin  Skin:     Findings: No rash  Comments: Toenails are thickened and ragged on both feet  Neurological:      Mental Status: She is alert and oriented to person, place, and time  Psychiatric:         Mood and Affect: Mood normal      Patient's shoes and socks removed  Right Foot/Ankle   Right Foot Inspection  Skin Exam: skin intact and erythema  No dry skin, no warmth, no callus, no maceration, no pre-ulcer, no ulcer and no callus  Toe Exam: ROM and strength within normal limits  No swelling and no tenderness    Sensory   Monofilament testing: intact    Vascular  Capillary refills: < 3 seconds  The right DP pulse is 1+  Left Foot/Ankle  Left Foot Inspection  Skin Exam: skin intact and erythema  No dry skin, no warmth, no maceration, no ulcer and no callus  Toe Exam: ROM and strength within normal limits  No swelling and no tenderness       Sensory   Monofilament testing: intact    Vascular  Capillary refills: < 3 seconds  The left DP pulse is 1+       Assign Risk Category  Deformity present  No loss of protective sensation  Weak pulses  Risk: [id number], CRNP

## 2023-01-11 ENCOUNTER — TELEPHONE (OUTPATIENT)
Dept: GASTROENTEROLOGY | Facility: CLINIC | Age: 63
End: 2023-01-11

## 2023-01-24 ENCOUNTER — HOSPITAL ENCOUNTER (OUTPATIENT)
Dept: VASCULAR ULTRASOUND | Facility: HOSPITAL | Age: 63
Discharge: HOME | End: 2023-01-24

## 2023-01-24 DIAGNOSIS — L53.9 DEPENDENT RUBOR: ICD-10-CM

## 2023-02-17 ENCOUNTER — OFFICE VISIT (OUTPATIENT)
Dept: GASTROENTEROLOGY | Facility: CLINIC | Age: 63
End: 2023-02-17

## 2023-02-17 VITALS
HEIGHT: 64 IN | BODY MASS INDEX: 44.73 KG/M2 | WEIGHT: 262 LBS | HEART RATE: 65 BPM | DIASTOLIC BLOOD PRESSURE: 83 MMHG | SYSTOLIC BLOOD PRESSURE: 122 MMHG

## 2023-02-17 DIAGNOSIS — Z12.11 COLON CANCER SCREENING: ICD-10-CM

## 2023-02-17 DIAGNOSIS — K74.69 OTHER CIRRHOSIS OF LIVER (HCC): Primary | ICD-10-CM

## 2023-02-17 DIAGNOSIS — K21.00 GASTROESOPHAGEAL REFLUX DISEASE WITH ESOPHAGITIS WITHOUT HEMORRHAGE: ICD-10-CM

## 2023-02-17 NOTE — PROGRESS NOTES
[facility] - Outpatient Follow-up Note  Nena Gutiérrez 57 y o  female MRN: [id number]  Encounter: [id number]          ASSESSMENT AND PLAN:      1  Other cirrhosis of liver ([facility] 75 )  Doing well with no complaints  We will check MELD labs  Repeat ultrasound about 4 months  Can hold off on repeat EGD until 2024  Continue carvedilol  - CBC; Future  - Comprehensive metabolic panel; Future  - Protime-INR; Future    2  Gastroesophageal reflux disease with esophagitis without hemorrhage  Symptoms well controlled through dietary discretion    3  Colon cancer screening  Will be due for repeat colonoscopy in 2027    ______________________________________________________________________    SUBJECTIVE: History of cirrhosis likely secondary to NAFLD  Has lost approximately 10 pounds intentionally in the past few months  Encouraged to continue to work toward weight loss    Also encouraged to work toward smoking cessation    REVIEW OF SYSTEMS:    ROS       Historical Information   Past Medical History:   Diagnosis Date   • Cirrhosis ([facility] 75 )    • Diabetes mellitus ([place] [address] )    • GERD (gastroesophageal reflux disease)    • Hypertension    • Seasonal allergies      Past Surgical History:   Procedure Laterality Date   • ACHILLES TENDON REPAIR Right    • COLONOSCOPY     • HYSTERECTOMY      age 41   • UPPER GASTROINTESTINAL ENDOSCOPY       Social History   Social History     Substance and Sexual Activity   Alcohol Use No    Comment: Sober for 20 years     Social History     Substance and Sexual Activity   Drug Use No     Social History     Tobacco Use   Smoking Status Every Day   • Packs/day: 0 25   • Years: 40 00   • Pack years: 10 00   • Types: Cigarettes   Smokeless Tobacco Never     Family History   Problem Relation Age of Onset   • Lung cancer Sister    • Cancer Sister    • Hypertension Mother    • No Known Problems Father    • No Known Problems Maternal Grandmother    • No Known Problems Maternal Grandfather    • No Known Problems Paternal Grandmother    • No Known Problems Paternal Grandfather    • No Known Problems Brother    • No Known Problems Brother        Meds/Allergies       Current Outpatient Medications:   •  Accu-Chek Guide test strip  •  ALPRAZolam (XANAX) 0 25 mg tablet  •  atorvastatin (LIPITOR) 20 mg tablet  •  carvedilol (COREG) 12 5 mg tablet  •  cetirizine (ZyrTEC) 10 mg tablet  •  lisinopril-hydrochlorothiazide (PRINZIDE,ZESTORETIC) 10-12 5 MG per tablet  •  metFORMIN (GLUCOPHAGE) 500 mg tablet    Allergies   Allergen Reactions   • Penicillins            Objective     Blood pressure 122/83, pulse 65, height 5' 4" (1 626 m), weight 119 kg (262 lb)  Body mass index is 44 97 kg/m²  PHYSICAL EXAM:      Physical Exam  Vitals and nursing note reviewed  Constitutional:       General: She is not in acute distress  Appearance: She is obese  She is not ill-appearing  HENT:      Head: Normocephalic and atraumatic  Eyes:      General: No scleral icterus  Extraocular Movements: Extraocular movements intact  Cardiovascular:      Rate and Rhythm: Normal rate and regular rhythm  Pulmonary:      Effort: Pulmonary effort is normal  No respiratory distress  Abdominal:      General: There is no distension  Palpations: Abdomen is soft  Tenderness: There is no abdominal tenderness  There is no guarding or rebound  Musculoskeletal:      Right lower leg: No edema  Left lower leg: No edema  Skin:     General: Skin is warm and dry  Coloration: Skin is not cyanotic  Findings: No erythema  Neurological:      General: No focal deficit present  Mental Status: She is alert and oriented to person, place, and time  Psychiatric:         Mood and Affect: Mood normal          Behavior: Behavior normal           Lab Results:   No visits with results within 1 Day(s) from this visit     Latest known visit with results is:   Appointment on 12/07/2022   Component Date Value   • WBC 12/07/2022 4 63    • RBC 12/07/2022 5 00    • Hemoglobin 12/07/2022 15 4    • Hematocrit 12/07/2022 46 6 (H)    • MCV 12/07/2022 93    • MCH 12/07/2022 30 8    • MCHC 12/07/2022 33 0    • RDW 12/07/2022 12 7    • MPV 12/07/2022 10 6    • Platelets 38/69/7332 142 (L)    • nRBC 12/07/2022 0    • Neutrophils Relative 12/07/2022 63    • Immat GRANS % 12/07/2022 0    • Lymphocytes Relative 12/07/2022 27    • Monocytes Relative 12/07/2022 6    • Eosinophils Relative 12/07/2022 3    • Basophils Relative 12/07/2022 1    • Neutrophils Absolute 12/07/2022 2 93    • Immature Grans Absolute 12/07/2022 0 02    • Lymphocytes Absolute 12/07/2022 1 26    • Monocytes Absolute 12/07/2022 0 26    • Eosinophils Absolute 12/07/2022 0 13    • Basophils Absolute 12/07/2022 0 03    • TSH 3RD GENERATON 12/07/2022 3 060    • Cholesterol 12/07/2022 119    • Triglycerides 12/07/2022 99    • HDL, Direct 12/07/2022 40 (L)    • LDL Calculated 12/07/2022 59    • Hemoglobin A1C 12/07/2022 6 2 (H)    • EAG 12/07/2022 131          Radiology Results:   VAS lower limb arterial duplex, complete bilateral    Result Date: 1/24/2023  Narrative:  [facility] REPORT CLINICAL: Indications:  Dependant rubor noted on clinical exam  Patient has no complaints  Operative History: denies cardiovascular surgeries Risk Factors The patient has history of HTN, Diabetes (Yes), Hyperlipidemia and smoking (current) 0 1 ppd  Clinical Right Pressure:  123/ mm Hg, Left Pressure:  115/ mm Hg    FINDINGS:  Segment                Right       Left                                          PSV (cm/s)  PSV (cm/s)  Common Femoral Artery         117         106  Prox Profunda                  53          55  Prox SFA                       84         101  Mid SFA                        95          71  Dist SFA                       82          93  Proximal Pop                   88          88  Distal Pop                     64          79  Dist Post Tibial [address]  Tibial              84          64     CONCLUSION: Impression: RIGHT LOWER LIMB: No evidence of significant lower extremity arterial occlusive disease  Ankle/Brachial index:  1 11  which is in the normal category  PVR/ PPG tracings are normal  Metatarsal pressure of 101 mmHg Great toe pressure of 70 mmHg, within the healing range  LEFT LOWER LIMB: No evidence of significant lower extremity arterial occlusive disease  Ankle/Brachial index:  1 2 which is in the normal category  PVR/ PPG tracings are normal  Metatarsal pressure of 168 mmHg Great toe pressure of 81 mmHg, within the healing range   Baseline study    SIGNATURE: Electronically Signed by: Royce Cervantes MD, RPVI on 2023-01-24 12:16:05 PM

## 2023-03-13 DIAGNOSIS — K74.69 OTHER CIRRHOSIS OF LIVER (HCC): ICD-10-CM

## 2023-03-13 RX ORDER — CARVEDILOL 12.5 MG/1
TABLET ORAL
Qty: 60 TABLET | Refills: 5 | Status: SHIPPED | OUTPATIENT
Start: 2023-03-13

## 2023-03-21 DIAGNOSIS — I10 ESSENTIAL HYPERTENSION: ICD-10-CM

## 2023-03-21 DIAGNOSIS — E78.2 MIXED HYPERLIPIDEMIA: ICD-10-CM

## 2023-03-22 RX ORDER — LISINOPRIL AND HYDROCHLOROTHIAZIDE 12.5; 1 MG/1; MG/1
TABLET ORAL
Qty: 90 TABLET | Refills: 1 | Status: SHIPPED | OUTPATIENT
Start: 2023-03-22

## 2023-03-22 RX ORDER — ATORVASTATIN CALCIUM 20 MG/1
TABLET, FILM COATED ORAL
Qty: 90 TABLET | Refills: 1 | Status: SHIPPED | OUTPATIENT
Start: 2023-03-22

## 2023-06-16 ENCOUNTER — RA CDI HCC (OUTPATIENT)
Dept: OTHER | Facility: HOSPITAL | Age: 63
End: 2023-06-16

## 2023-06-16 NOTE — PROGRESS NOTES
[facility] 75  coding opportunities       Chart reviewed, no opportunity found: CHART REVIEWED, NO OPPORTUNITY FOUND        Patients Insurance        Commercial Insurance: Dominik Duarte

## 2023-06-20 ENCOUNTER — APPOINTMENT (OUTPATIENT)
Dept: LAB | Facility: CLINIC | Age: 63
End: 2023-06-20
Payer: COMMERCIAL

## 2023-06-20 DIAGNOSIS — E11.8 TYPE 2 DIABETES MELLITUS WITH COMPLICATION, WITHOUT LONG-TERM CURRENT USE OF INSULIN (HCC): ICD-10-CM

## 2023-06-20 DIAGNOSIS — I10 ESSENTIAL HYPERTENSION: ICD-10-CM

## 2023-06-20 DIAGNOSIS — E66.01 MORBID OBESITY (HCC): ICD-10-CM

## 2023-06-20 DIAGNOSIS — K74.69 OTHER CIRRHOSIS OF LIVER (HCC): ICD-10-CM

## 2023-06-20 DIAGNOSIS — E78.2 MIXED HYPERLIPIDEMIA: ICD-10-CM

## 2023-06-20 LAB
ALBUMIN SERPL BCP-MCNC: 3.7 G/DL (ref 3.5–5)
ALP SERPL-CCNC: 90 U/L (ref 46–116)
ALT SERPL W P-5'-P-CCNC: 36 U/L (ref 12–78)
ANION GAP SERPL CALCULATED.3IONS-SCNC: 4 MMOL/L
AST SERPL W P-5'-P-CCNC: 27 U/L (ref 5–45)
BASOPHILS # BLD AUTO: 0.02 THOUSANDS/ÂΜL (ref 0–0.1)
BASOPHILS NFR BLD AUTO: 1 % (ref 0–1)
BILIRUB SERPL-MCNC: 0.71 MG/DL (ref 0.2–1)
BUN SERPL-MCNC: 12 MG/DL (ref 5–25)
CALCIUM SERPL-MCNC: 9.6 MG/DL (ref 8.3–10.1)
CHLORIDE SERPL-SCNC: 108 MMOL/L (ref 96–108)
CHOLEST SERPL-MCNC: 142 MG/DL
CO2 SERPL-SCNC: 27 MMOL/L (ref 21–32)
CREAT SERPL-MCNC: 0.68 MG/DL (ref 0.6–1.3)
CREAT UR-MCNC: 385 MG/DL
EOSINOPHIL # BLD AUTO: 0.11 THOUSAND/ÂΜL (ref 0–0.61)
EOSINOPHIL NFR BLD AUTO: 3 % (ref 0–6)
ERYTHROCYTE [DISTWIDTH] IN BLOOD BY AUTOMATED COUNT: 12.7 % (ref 11.6–15.1)
EST. AVERAGE GLUCOSE BLD GHB EST-MCNC: 134 MG/DL
GFR SERPL CREATININE-BSD FRML MDRD: 92 ML/MIN/1.73SQ M
GLUCOSE P FAST SERPL-MCNC: 160 MG/DL (ref 65–99)
HBA1C MFR BLD: 6.3 %
HCT VFR BLD AUTO: 45 % (ref 34.8–46.1)
HDLC SERPL-MCNC: 44 MG/DL
HGB BLD-MCNC: 15.1 G/DL (ref 11.5–15.4)
IMM GRANULOCYTES # BLD AUTO: 0.01 THOUSAND/UL (ref 0–0.2)
IMM GRANULOCYTES NFR BLD AUTO: 0 % (ref 0–2)
INR PPP: 1.03 (ref 0.84–1.19)
LDLC SERPL CALC-MCNC: 79 MG/DL (ref 0–100)
LYMPHOCYTES # BLD AUTO: 1.39 THOUSANDS/ÂΜL (ref 0.6–4.47)
LYMPHOCYTES NFR BLD AUTO: 33 % (ref 14–44)
MCH RBC QN AUTO: 32.1 PG (ref 26.8–34.3)
MCHC RBC AUTO-ENTMCNC: 33.6 G/DL (ref 31.4–37.4)
MCV RBC AUTO: 96 FL (ref 82–98)
MICROALBUMIN UR-MCNC: 25.3 MG/L (ref 0–20)
MICROALBUMIN/CREAT 24H UR: 7 MG/G CREATININE (ref 0–30)
MONOCYTES # BLD AUTO: 0.23 THOUSAND/ÂΜL (ref 0.17–1.22)
MONOCYTES NFR BLD AUTO: 5 % (ref 4–12)
NEUTROPHILS # BLD AUTO: 2.48 THOUSANDS/ÂΜL (ref 1.85–7.62)
NEUTS SEG NFR BLD AUTO: 58 % (ref 43–75)
NRBC BLD AUTO-RTO: 0 /100 WBCS
PLATELET # BLD AUTO: 151 THOUSANDS/UL (ref 149–390)
PMV BLD AUTO: 11.3 FL (ref 8.9–12.7)
POTASSIUM SERPL-SCNC: 3.9 MMOL/L (ref 3.5–5.3)
PROT SERPL-MCNC: 7.2 G/DL (ref 6.4–8.4)
PROTHROMBIN TIME: 13.7 SECONDS (ref 11.6–14.5)
RBC # BLD AUTO: 4.71 MILLION/UL (ref 3.81–5.12)
SODIUM SERPL-SCNC: 139 MMOL/L (ref 135–147)
TRIGL SERPL-MCNC: 95 MG/DL
TSH SERPL DL<=0.05 MIU/L-ACNC: 3.26 UIU/ML (ref 0.45–4.5)
WBC # BLD AUTO: 4.24 THOUSAND/UL (ref 4.31–10.16)

## 2023-06-20 PROCEDURE — 36415 COLL VENOUS BLD VENIPUNCTURE: CPT

## 2023-06-20 PROCEDURE — 82043 UR ALBUMIN QUANTITATIVE: CPT

## 2023-06-20 PROCEDURE — 80061 LIPID PANEL: CPT

## 2023-06-20 PROCEDURE — 80053 COMPREHEN METABOLIC PANEL: CPT

## 2023-06-20 PROCEDURE — 85025 COMPLETE CBC W/AUTO DIFF WBC: CPT

## 2023-06-20 PROCEDURE — 85610 PROTHROMBIN TIME: CPT

## 2023-06-20 PROCEDURE — 82570 ASSAY OF URINE CREATININE: CPT

## 2023-06-20 PROCEDURE — 84443 ASSAY THYROID STIM HORMONE: CPT

## 2023-06-20 PROCEDURE — 83036 HEMOGLOBIN GLYCOSYLATED A1C: CPT

## 2023-06-22 ENCOUNTER — VBI (OUTPATIENT)
Dept: ADMINISTRATIVE | Facility: OTHER | Age: 63
End: 2023-06-22

## 2023-06-26 ENCOUNTER — OFFICE VISIT (OUTPATIENT)
Dept: GASTROENTEROLOGY | Facility: CLINIC | Age: 63
End: 2023-06-26
Payer: COMMERCIAL

## 2023-06-26 VITALS
HEIGHT: 64 IN | HEART RATE: 80 BPM | SYSTOLIC BLOOD PRESSURE: 127 MMHG | DIASTOLIC BLOOD PRESSURE: 104 MMHG | WEIGHT: 259 LBS | BODY MASS INDEX: 44.22 KG/M2

## 2023-06-26 DIAGNOSIS — K74.69 OTHER CIRRHOSIS OF LIVER (HCC): Primary | ICD-10-CM

## 2023-06-26 PROCEDURE — 99213 OFFICE O/P EST LOW 20 MIN: CPT | Performed by: INTERNAL MEDICINE

## 2023-06-26 NOTE — PROGRESS NOTES
[facility] - Outpatient Follow-up Note  Sailaja Mantilla 57 y o  female MRN: [id number]  Encounter: [id number]          ASSESSMENT AND PLAN:      1  Other cirrhosis of liver (HCC)  MELD stable at 7  We will continue to work toward weight loss and follow-up with her primary care provider to maintain good glucose control  Due for abdominal ultrasound and AFP    - AFP tumor marker; Future    ______________________________________________________________________    SUBJECTIVE: Patient with history of cirrhosis likely multifactorial primarily secondary to metabolic syndrome/steatohepatitis  Doing well with no complaints currently  Has lost a few pounds though her A1c is mildly elevated  No nausea or vomiting, fever chills, jaundice or rash, bleeding or bruising, confusion, increased abdominal girth or lower extremity edema        REVIEW OF SYSTEMS:    ROS       Historical Information   Past Medical History:   Diagnosis Date   • Cirrhosis ([facility] 75 )    • Diabetes mellitus ([facility] 75 )    • GERD (gastroesophageal reflux disease)    • Hypertension    • Seasonal allergies      Past Surgical History:   Procedure Laterality Date   • ACHILLES TENDON REPAIR Right    • COLONOSCOPY     • HYSTERECTOMY      age 41   • UPPER GASTROINTESTINAL ENDOSCOPY       Social History   Social History     Substance and Sexual Activity   Alcohol Use No    Comment: Sober for 20 years     Social History     Substance and Sexual Activity   Drug Use No     Social History     Tobacco Use   Smoking Status Every Day   • Packs/day: 0 25   • Years: 40 00   • Total pack years: 10 00   • Types: Cigarettes   Smokeless Tobacco Never     Family History   Problem Relation Age of Onset   • Lung cancer Sister    • Cancer Sister    • Hypertension Mother    • No Known Problems Father    • No Known Problems Maternal Grandmother    • No Known Problems Maternal Grandfather    • No Known Problems Paternal Grandmother    • No Known Problems "Paternal Grandfather    • No Known Problems Brother    • No Known Problems Brother        Meds/Allergies       Current Outpatient Medications:   •  Accu-Chek Guide test strip  •  ALPRAZolam (XANAX) 0 25 mg tablet  •  atorvastatin (LIPITOR) 20 mg tablet  •  carvedilol (COREG) 12 5 mg tablet  •  cetirizine (ZyrTEC) 10 mg tablet  •  lisinopril-hydrochlorothiazide (PRINZIDE,ZESTORETIC) 10-12 5 MG per tablet  •  metFORMIN (GLUCOPHAGE) 500 mg tablet    Allergies   Allergen Reactions   • Penicillins            Objective     Blood pressure (!) 127/104, pulse 80, height 5' 4\" (1 626 m), weight 117 kg (259 lb)  Body mass index is 44 46 kg/m²  PHYSICAL EXAM:      Physical Exam  Vitals and nursing note reviewed  Constitutional:       General: She is not in acute distress  Appearance: She is obese  She is not ill-appearing  HENT:      Head: Normocephalic and atraumatic  Eyes:      General: No scleral icterus  Extraocular Movements: Extraocular movements intact  Cardiovascular:      Rate and Rhythm: Normal rate and regular rhythm  Pulmonary:      Effort: Pulmonary effort is normal  No respiratory distress  Abdominal:      General: There is no distension  Palpations: Abdomen is soft  Tenderness: There is no abdominal tenderness  There is no guarding or rebound  Musculoskeletal:      Right lower leg: No edema  Left lower leg: No edema  Skin:     General: Skin is warm and dry  Coloration: Skin is not cyanotic  Findings: No erythema  Neurological:      General: No focal deficit present  Mental Status: She is alert and oriented to person, place, and time  Psychiatric:         Mood and Affect: Mood normal          Behavior: Behavior normal           Lab Results:   No visits with results within 1 Day(s) from this visit     Latest known visit with results is:   Appointment on 06/20/2023   Component Date Value   • WBC 06/20/2023 4 24 (L)    • RBC 06/20/2023 4 71    • Hemoglobin " 06/20/2023 15 1    • Hematocrit 06/20/2023 45 0    • MCV 06/20/2023 96    • MCH 06/20/2023 32 1    • MCHC 06/20/2023 33 6    • RDW 06/20/2023 12 7    • MPV 06/20/2023 11 3    • Platelets 14/58/0369 151    • nRBC 06/20/2023 0    • Neutrophils Relative 06/20/2023 58    • Immat GRANS % 06/20/2023 0    • Lymphocytes Relative 06/20/2023 33    • Monocytes Relative 06/20/2023 5    • Eosinophils Relative 06/20/2023 3    • Basophils Relative 06/20/2023 1    • Neutrophils Absolute 06/20/2023 2 48    • Immature Grans Absolute 06/20/2023 0 01    • Lymphocytes Absolute 06/20/2023 1 39    • Monocytes Absolute 06/20/2023 0 23    • Eosinophils Absolute 06/20/2023 0 11    • Basophils Absolute 06/20/2023 0 02    • TSH 3RD GENERATON 06/20/2023 3 262    • Cholesterol 06/20/2023 142    • Triglycerides 06/20/2023 95    • HDL, Direct 06/20/2023 44 (L)    • LDL Calculated 06/20/2023 79    • Hemoglobin A1C 06/20/2023 6 3 (H)    • EAG 06/20/2023 134    • Creatinine, Ur 06/20/2023 385 0    • Albumin,U,Random 06/20/2023 25 3 (H)    • Albumin Creat Ratio 06/20/2023 7    • Sodium 06/20/2023 139    • Potassium 06/20/2023 3 9    • Chloride 06/20/2023 108    • CO2 06/20/2023 27    • ANION GAP 06/20/2023 4    • BUN 06/20/2023 12    • Creatinine 06/20/2023 0 68    • Glucose, Fasting 06/20/2023 160 (H)    • Calcium 06/20/2023 9 6    • AST 06/20/2023 27    • ALT 06/20/2023 36    • Alkaline Phosphatase 06/20/2023 90    • Total Protein 06/20/2023 7 2    • Albumin 06/20/2023 3 7    • Total Bilirubin 06/20/2023 0 71    • eGFR 06/20/2023 92    • Protime 06/20/2023 13 7    • INR 06/20/2023 1 03          Radiology Results:   No results found

## 2023-07-06 ENCOUNTER — OFFICE VISIT (OUTPATIENT)
Dept: FAMILY MEDICINE CLINIC | Facility: CLINIC | Age: 63
End: 2023-07-06
Payer: COMMERCIAL

## 2023-07-06 VITALS
RESPIRATION RATE: 16 BRPM | BODY MASS INDEX: 45.24 KG/M2 | HEART RATE: 67 BPM | WEIGHT: 265 LBS | DIASTOLIC BLOOD PRESSURE: 80 MMHG | OXYGEN SATURATION: 98 % | HEIGHT: 64 IN | SYSTOLIC BLOOD PRESSURE: 124 MMHG

## 2023-07-06 DIAGNOSIS — E11.69 DIABETES MELLITUS TYPE 2 IN OBESE (HCC): Primary | ICD-10-CM

## 2023-07-06 DIAGNOSIS — E78.2 MIXED HYPERLIPIDEMIA: ICD-10-CM

## 2023-07-06 DIAGNOSIS — R80.9 MICROALBUMINURIA: ICD-10-CM

## 2023-07-06 DIAGNOSIS — F41.9 ANXIETY: ICD-10-CM

## 2023-07-06 DIAGNOSIS — I10 ESSENTIAL HYPERTENSION: ICD-10-CM

## 2023-07-06 DIAGNOSIS — K74.69 OTHER CIRRHOSIS OF LIVER (HCC): ICD-10-CM

## 2023-07-06 DIAGNOSIS — E66.9 DIABETES MELLITUS TYPE 2 IN OBESE (HCC): Primary | ICD-10-CM

## 2023-07-06 DIAGNOSIS — E66.01 MORBID OBESITY (HCC): ICD-10-CM

## 2023-07-06 DIAGNOSIS — Z12.31 ENCOUNTER FOR SCREENING MAMMOGRAM FOR BREAST CANCER: ICD-10-CM

## 2023-07-06 LAB
LEFT EYE DIABETIC RETINOPATHY: NORMAL
LEFT EYE IMAGE QUALITY: NORMAL
LEFT EYE MACULAR EDEMA: NORMAL
LEFT EYE OTHER RETINOPATHY: NORMAL
RIGHT EYE DIABETIC RETINOPATHY: NORMAL
RIGHT EYE IMAGE QUALITY: NORMAL
RIGHT EYE MACULAR EDEMA: NORMAL
RIGHT EYE OTHER RETINOPATHY: NORMAL
SEVERITY (EYE EXAM): NORMAL

## 2023-07-06 PROCEDURE — 99214 OFFICE O/P EST MOD 30 MIN: CPT | Performed by: NURSE PRACTITIONER

## 2023-07-06 NOTE — PROGRESS NOTES
Name: July Monroe      : 1960      MRN: [id number]  Encounter Provider: SHAWNA Blanchard  Encounter Date: 2023   Encounter department: [address] [place] [address]     1. Diabetes mellitus type 2 in obese (HCC)  -     IRIS Diabetic eye exam  -     HEMOGLOBIN A1C W/ EAG ESTIMATION; Future; Expected date: 2024  -     Comprehensive metabolic panel; Future; Expected date: 2024  -     CBC and differential; Future; Expected date: 2024  -     Lipid Panel with Direct LDL reflex; Future; Expected date: 2024    Hemoglobin A1c 6.3. Continue metformin. Patient instructed to eat a healthy low fat/diabetic diet. 2. Essential hypertension  -     Comprehensive metabolic panel; Future; Expected date: 2024  -     CBC and differential; Future; Expected date: 2024  -     Lipid Panel with Direct LDL reflex; Future; Expected date: 2024     /80. Continue current medications. 3. Mixed hyperlipidemia  -     Comprehensive metabolic panel; Future; Expected date: 2024  -     CBC and differential; Future; Expected date: 2024  -     Lipid Panel with Direct LDL reflex; Future; Expected date: 2024    Total cholesterol 142, LDL 79. Patient instructed to eat a healthy low fat/diabetic diet. Continue atorvastatin 20mg daily. Repeat lipid panel in 6 months. 4. Anxiety  Continue xanax prn.     5. Morbid obesity ([address])  -     HEMOGLOBIN A1C W/ EAG ESTIMATION; Future; Expected date: 2024  -     Comprehensive metabolic panel; Future; Expected date: 2024  -     CBC and differential; Future; Expected date: 2024  -     Lipid Panel with Direct LDL reflex; Future; Expected date: 2024    Patient instructed to eat a healthy low fat/diabetic diet. 6. Other cirrhosis of liver (HCC)  Continue to follow-up with gastroenterology.      7. Microalbuminuria  -     Ambulatory Referral to Nephrology; Future    8. Encounter for screening mammogram for breast cancer  -     Mammo screening bilateral w 3d & cad; Future; Expected date: 07/06/2023    Reviewed lab results with the patient. Patient instructed to follow-up in 6 months or sooner prn. BMI Counseling: Body mass index is 45.49 kg/m². The BMI is above normal. Nutrition recommendations include encouraging healthy choices of fruits and vegetables, decreasing fast food intake, reducing intake of saturated and trans fat and reducing intake of cholesterol. Rationale for BMI follow-up plan is due to patient being overweight or obese. Tobacco Cessation Counseling: Tobacco cessation counseling was provided. The patient is sincerely urged to quit consumption of tobacco. She is not ready to quit tobacco.         Subjective      Patient is here for a follow-up for chronic medical conditions. Patient takes metformin for DM 2. Denies any Has, dizziness, nausea, or vomiting. Patient follows up with gastroenterology for liver cirrhosis. Patient takes lisinopril-HCTZ for HTN. Denies any chest pain or SOB. Patient is here for a follow-up for hyperlipidemia and obesity. Patient reports that she tries to eat a healthy diet. Patient is here for a follow-up for anxiety. Patient reports that she rarely takes the xanax. Denies any depression or suicidal thoughts. Review of Systems   Constitutional: Negative for chills and fever. HENT: Negative for congestion, ear pain and sore throat. Respiratory: Negative for chest tightness, shortness of breath and wheezing. Cardiovascular: Negative for chest pain. Gastrointestinal: Negative for abdominal pain, blood in stool, diarrhea, nausea and vomiting. Genitourinary: Negative for dysuria, frequency and hematuria. Skin: Negative for rash. Neurological: Negative for dizziness, seizures, syncope, light-headedness and headaches. Psychiatric/Behavioral: Negative for suicidal ideas.         As noted in HPI.        Current Outpatient Medications on File Prior to Visit   Medication Sig   • Accu-Chek Guide test strip USE TO CHECK BLOOD SUGAR ONCE DAILY AS DIRECTED   • ALPRAZolam (XANAX) 0.25 mg tablet Take 1 tablet (0.25 mg total) by mouth daily at bedtime as needed for anxiety   • atorvastatin (LIPITOR) 20 mg tablet TAKE ONE TABLET BY MOUTH EVERY DAY   • carvedilol (COREG) 12.5 mg tablet TAKE ONE TABLET BY MOUTH TWICE A DAY WITH MEALS   • cetirizine (ZyrTEC) 10 mg tablet Take 10 mg by mouth daily   • lisinopril-hydrochlorothiazide (PRINZIDE,ZESTORETIC) 10-12.5 MG per tablet TAKE ONE TABLET BY MOUTH EVERY DAY   • metFORMIN (GLUCOPHAGE) 500 mg tablet TAKE ONE TABLET BY MOUTH TWICE A DAY WITH MEALS       Objective     /80 (BP Location: Right arm, Patient Position: Sitting, Cuff Size: Large)   Pulse 67   Resp 16   Ht 5' 4" (1.626 m)   Wt 120 kg (265 lb)   SpO2 98%   BMI 45.49 kg/m²     Physical Exam  Vitals reviewed. Constitutional:       General: She is not in acute distress. Appearance: She is obese. She is not ill-appearing or diaphoretic. HENT:      Right Ear: External ear normal.      Left Ear: External ear normal.      Nose: Nose normal.      Mouth/Throat:      Mouth: Mucous membranes are moist.      Pharynx: Oropharynx is clear. No oropharyngeal exudate or posterior oropharyngeal erythema. Eyes:      Conjunctiva/sclera: Conjunctivae normal.      Pupils: Pupils are equal, round, and reactive to light. Cardiovascular:      Rate and Rhythm: Normal rate and regular rhythm. Pulses: Normal pulses. Heart sounds: Normal heart sounds. Comments: No edema noted. Pulmonary:      Effort: Pulmonary effort is normal. No respiratory distress. Breath sounds: Normal breath sounds. No wheezing. Musculoskeletal:      Comments: Gait wnl. Skin:     Findings: No rash. Neurological:      Mental Status: She is alert and oriented to person, place, and time.    Psychiatric:         Mood and Affect: Mood normal.       SHAWNA Romero

## 2023-07-07 ENCOUNTER — TELEPHONE (OUTPATIENT)
Dept: NEPHROLOGY | Facility: CLINIC | Age: 63
End: 2023-07-07

## 2023-07-07 NOTE — TELEPHONE ENCOUNTER
New Patient Intake Form   Patient Details   Steve Seay     1960     [id number]     Insurance Information   Name of [address] [place]   Does the patient need an insurance referral? no   If patient has [employer], please ask if they will be using their [employer]. Appointment Information   Who is calling to schedule? If not patient, what is callers name? [address]   Referring Provider  Pavan Pena   Reason for Appt (Diagnosis) Microalbuminuria     Does Patient have labs/urine done at [facility]? If not, where do they go? List the date of last lab / urine  *Please try to get labs 2 years back if not at [address] Yes  June 2023   Has patient been hospitalized recently? If yes, list name and location of hospital they were in no   Has patient been seen by a Nephrologist before? If yes, list name, location and phone number no   Has the patient had renal imaging done? If so, list the most recent date and type of imaging yes US yearly   Does patient have a history of Kidney Stones? no   Appointment Details   Is there a referral on file?  yes    Appointment Date 9/5    [facility] [place]   Miscellaneous

## 2023-07-15 ENCOUNTER — HOSPITAL ENCOUNTER (OUTPATIENT)
Dept: ULTRASOUND IMAGING | Facility: HOSPITAL | Age: 63
Discharge: HOME | End: 2023-07-15
Attending: INTERNAL MEDICINE
Payer: COMMERCIAL

## 2023-07-15 PROCEDURE — 76705 ECHO EXAM OF ABDOMEN: CPT

## 2023-07-24 ENCOUNTER — APPOINTMENT (OUTPATIENT)
Dept: LAB | Facility: CLINIC | Age: 63
End: 2023-07-24
Payer: COMMERCIAL

## 2023-07-24 DIAGNOSIS — K74.69 OTHER CIRRHOSIS OF LIVER (HCC): ICD-10-CM

## 2023-07-24 LAB — AFP-TM SERPL-MCNC: 3.56 NG/ML (ref 0–9)

## 2023-07-24 PROCEDURE — 36415 COLL VENOUS BLD VENIPUNCTURE: CPT

## 2023-07-24 PROCEDURE — 82105 ALPHA-FETOPROTEIN SERUM: CPT

## 2023-08-08 ENCOUNTER — TELEPHONE (OUTPATIENT)
Dept: NEPHROLOGY | Facility: CLINIC | Age: 63
End: 2023-08-08

## 2023-08-09 ENCOUNTER — CONSULT (OUTPATIENT)
Dept: NEPHROLOGY | Facility: CLINIC | Age: 63
End: 2023-08-09

## 2023-08-09 VITALS
HEART RATE: 64 BPM | DIASTOLIC BLOOD PRESSURE: 80 MMHG | SYSTOLIC BLOOD PRESSURE: 130 MMHG | WEIGHT: 264.4 LBS | BODY MASS INDEX: 45.14 KG/M2 | HEIGHT: 64 IN

## 2023-08-09 DIAGNOSIS — I10 ESSENTIAL HYPERTENSION: ICD-10-CM

## 2023-08-09 DIAGNOSIS — R80.9 MICROALBUMINURIA: ICD-10-CM

## 2023-08-09 DIAGNOSIS — E66.9 TYPE 2 DIABETES MELLITUS WITH OBESITY (HCC): Primary | ICD-10-CM

## 2023-08-09 DIAGNOSIS — F41.9 ANXIETY: ICD-10-CM

## 2023-08-09 DIAGNOSIS — E11.69 TYPE 2 DIABETES MELLITUS WITH OBESITY (HCC): Primary | ICD-10-CM

## 2023-08-09 LAB
SL AMB  POCT GLUCOSE, UA: NORMAL
SL AMB LEUKOCYTE ESTERASE,UA: NORMAL
SL AMB POCT BILIRUBIN,UA: NORMAL
SL AMB POCT BLOOD,UA: NORMAL
SL AMB POCT CLARITY,UA: CLEAR
SL AMB POCT COLOR,UA: YELLOW
SL AMB POCT KETONES,UA: NORMAL
SL AMB POCT NITRITE,UA: NORMAL
SL AMB POCT PH,UA: 5
SL AMB POCT SPECIFIC GRAVITY,UA: 1.02
SL AMB POCT URINE PROTEIN: NORMAL
SL AMB POCT UROBILINOGEN: 0.2

## 2023-08-09 RX ORDER — ALPRAZOLAM 0.25 MG/1
0.25 TABLET ORAL
Qty: 30 TABLET | Refills: 0 | Status: SHIPPED | OUTPATIENT
Start: 2023-08-09

## 2023-08-09 RX ORDER — MULTIVITAMIN
1 CAPSULE ORAL DAILY
COMMUNITY

## 2023-08-09 NOTE — PROGRESS NOTES
NEPHROLOGY OUTPATIENT CONSULTATION   Casey Forbes 57 y.o. female MRN: [id number]  Date: 08/09/23  Reason for consultation: Evaluation for proteinuria/albuminuria    ASSESSMENT and PLAN:  77-year-old female was seen in nephrology office today for evaluation of albuminuria.     # Concern for albuminuria in a patient with diabetes  - Most recent urine albumin to creatinine ratio 7 mg/g 06/2023 not high enough to label as albuminuria   - Dipstick analysis in the office today showed trace proteinuria, urine sediment showed squamous epithelial cells and was essentially bland  - Check urinalysis with microscopy  - Repeat urine albumin to creatinine ratio  - Check urine protein to creatinine ratio  - Check 24-hour urine protein for sake of completeness  - She is currently on RAAS blockade which should help retard progression of albuminuria if present  - If she truly has albuminuria, which would be urine albumin to creatinine ratio of more than 30 mg/g, may consider addition of SGLT2 inhibitor in future  - No indication for addition of SGLT2 inhibitor at this time    # Kidney function  - She has normal kidney function with GFR more than 90  - Risk factors for development of kidney disease in future would include hypertension, diabetes and liver cirrhosis  - Discussed importance of good blood pressure control and diabetes control in preventing development of kidney disease  - Discussed avoidance of NSAIDs    # Type 2 diabetes and obesity   - Most recent HbA1c 6.3 06/2023 which is at goal  - Currently on metformin 500 mg twice daily  - Discussed importance of weight loss and management of diabetes    # Hypertension/Volume   - Target Goal: Less than 120/80 per KDIGO guidelines   - Volume status: Euvolemic  - Status: Blood pressure currently slightly above goal  - Current antihypertensive regimen: Coreg 12.5 mg twice daily, lisinopril-hydrochlorothiazide 10-25 mg daily  - Changes: No changes to antihypertensive regimen today, she was asked to maintain a blood pressure log at home  - We will make changes to her antihypertensive regimen based on results of home blood pressure monitoring  - Smoking cessation, low-salt diet, weight loss and exercise as nonpharmacologic methods of blood pressure control    HISTORY OF PRESENT ILLNESS:  Requesting Physician: SHAWNA Chappell    Cat Inman is a 57 y.o. female who has history of diabetes for last 6 to 7 years. She has history of hypertension for last 10 years. She has history of liver cirrhosis. She previously used to drink alcohol but has now quit completely. He has chronic dyspnea. She did leg swelling. She denies any trouble with urination. She does not take NSAIDs. She does not have any personal or family history of kidney disease. >> Medical history evaluation   - Diabetes: Yes for 6-7 years  - Diabetic retinopathy: No   - Hypertension: Yes for 10 years   - Age ?  52 years: Yes   - Family history of kidney disease: No  - Obesity or metabolic syndrome: Yes   - Prior kidney disease or dialysis: No   - Incidental hematuria in the past: No    - Urinary symptoms: No  - History of foamy or frothy urine: No  - History of nephrolithiasis: No   - Diseases that share risk factors with CKD: DM, HTN  - Systemic diseases that might affect kidney: No  - History of use of medications that might affect renal function: No     PAST MEDICAL HISTORY:  Past Medical History:   Diagnosis Date   • Cirrhosis ([address])    • Diabetes mellitus ([address])    • Ear problems    • GERD (gastroesophageal reflux disease)    • HL (hearing loss)    • Hypertension    • Seasonal allergies    • Tinnitus        PAST SURGICAL HISTORY:  Past Surgical History:   Procedure Laterality Date   • ACHILLES TENDON REPAIR Right    • COLONOSCOPY     • HYSTERECTOMY      age 41   • UPPER GASTROINTESTINAL ENDOSCOPY         ALLERGIES:  Allergies   Allergen Reactions   • Penicillins        SOCIAL HISTORY:  Social History Substance and Sexual Activity   Alcohol Use No    Comment: Sober for 20 years     Social History     Substance and Sexual Activity   Drug Use No     Social History     Tobacco Use   Smoking Status Every Day   • Packs/day: 0.25   • Years: 40.00   • Total pack years: 10.00   • Types: Cigarettes   Smokeless Tobacco Never       FAMILY HISTORY:  Family History   Problem Relation Age of Onset   • Lung cancer Sister    • Cancer Sister    • Hypertension Mother    • No Known Problems Father    • No Known Problems Maternal Grandmother    • No Known Problems Maternal Grandfather    • No Known Problems Paternal Grandmother    • No Known Problems Paternal Grandfather    • No Known Problems Brother    • No Known Problems Brother        MEDICATIONS:    Current Outpatient Medications:   •  Accu-Chek Guide test strip, USE TO CHECK BLOOD SUGAR ONCE DAILY AS DIRECTED, Disp: 100 strip, Rfl: 1  •  ALPRAZolam (XANAX) 0.25 mg tablet, Take 1 tablet (0.25 mg total) by mouth daily at bedtime as needed for anxiety, Disp: 30 tablet, Rfl: 0  •  atorvastatin (LIPITOR) 20 mg tablet, TAKE ONE TABLET BY MOUTH EVERY DAY, Disp: 90 tablet, Rfl: 1  •  carvedilol (COREG) 12.5 mg tablet, TAKE ONE TABLET BY MOUTH TWICE A DAY WITH MEALS, Disp: 60 tablet, Rfl: 5  •  cetirizine (ZyrTEC) 10 mg tablet, Take 10 mg by mouth daily, Disp: , Rfl:   •  lisinopril-hydrochlorothiazide (PRINZIDE,ZESTORETIC) 10-12.5 MG per tablet, TAKE ONE TABLET BY MOUTH EVERY DAY, Disp: 90 tablet, Rfl: 1  •  metFORMIN (GLUCOPHAGE) 500 mg tablet, TAKE ONE TABLET BY MOUTH TWICE A DAY WITH MEALS, Disp: 180 tablet, Rfl: 1  •  Multiple Vitamin (multivitamin) capsule, Take 1 capsule by mouth daily, Disp: , Rfl:     REVIEW OF SYSTEMS:  Review of Systems   Constitutional: Negative for chills and fever. HENT: Negative for ear pain and sore throat. Eyes: Negative for pain and visual disturbance. Respiratory: Negative for cough and shortness of breath.     Cardiovascular: Negative for chest pain and palpitations. Gastrointestinal: Negative for abdominal pain and vomiting. Genitourinary: Negative for dysuria and hematuria. Musculoskeletal: Negative for arthralgias and back pain. Skin: Negative for color change and rash. Neurological: Negative for seizures and syncope. All other systems reviewed and are negative. All the systems were reviewed and were negative except as documented on the HPI. PHYSICAL EXAMINATION:  /80   Pulse 64   Ht 5' 4" (1.626 m)   Wt 120 kg (264 lb 6.4 oz)   BMI 45.38 kg/m²   Current Weight: Weight - Scale: 120 kg (264 lb 6.4 oz) Body mass index is 45.38 kg/m². Physical Exam  Constitutional:       Appearance: Normal appearance. HENT:      Head: Normocephalic and atraumatic. Mouth/Throat:      Mouth: Mucous membranes are moist.      Pharynx: Oropharynx is clear. Cardiovascular:      Rate and Rhythm: Normal rate and regular rhythm. Pulses: Normal pulses. Heart sounds: Normal heart sounds. Pulmonary:      Effort: Pulmonary effort is normal.      Breath sounds: Normal breath sounds. Abdominal:      General: Bowel sounds are normal.      Palpations: Abdomen is soft. Musculoskeletal:         General: Normal range of motion. Right lower leg: No edema. Left lower leg: No edema. Skin:     General: Skin is warm and dry. Neurological:      General: No focal deficit present. Mental Status: She is alert and oriented to person, place, and time. Mental status is at baseline. Psychiatric:         Mood and Affect: Mood normal.         Behavior: Behavior normal.         Thought Content:  Thought content normal.         Judgment: Judgment normal.         LABORATORY RESULTS:  Lab Results   Component Value Date     05/08/2014    K 3.9 06/20/2023     06/20/2023    CO2 27 06/20/2023    ANIONGAP 14 (H) 05/08/2014    BUN 12 06/20/2023    CREATININE 0.68 06/20/2023    GLUCOSE 102 05/08/2014    GLUF 160 (H) 06/20/2023 CALCIUM 9.6 06/20/2023    AST 27 06/20/2023    ALT 36 06/20/2023    ALKPHOS 90 06/20/2023    PROT 7.2 05/08/2014    BILITOT 0.7 05/08/2014    EGFR 92 06/20/2023      Lab Results   Component Value Date    WBC 4.24 (L) 06/20/2023    HGB 15.1 06/20/2023    HCT 45.0 06/20/2023    MCV 96 06/20/2023     06/20/2023     Lab Results   Component Value Date    CALCIUM 9.6 06/20/2023

## 2023-08-09 NOTE — PATIENT INSTRUCTIONS
We have ordered blood work and urine test for further evaluation of kidney function. At this point, no changes are being made to your medications. Check your blood pressure at home and make a log. We may need to make adjustments to your blood pressure medications based on the blood pressure report from home.

## 2023-08-10 ENCOUNTER — APPOINTMENT (OUTPATIENT)
Dept: LAB | Facility: CLINIC | Age: 63
End: 2023-08-10
Payer: COMMERCIAL

## 2023-08-10 DIAGNOSIS — E11.69 TYPE 2 DIABETES MELLITUS WITH OBESITY (HCC): ICD-10-CM

## 2023-08-10 DIAGNOSIS — I10 ESSENTIAL HYPERTENSION: ICD-10-CM

## 2023-08-10 DIAGNOSIS — E66.9 TYPE 2 DIABETES MELLITUS WITH OBESITY (HCC): ICD-10-CM

## 2023-08-10 LAB
ALBUMIN SERPL BCP-MCNC: 3.6 G/DL (ref 3.5–5)
ANION GAP SERPL CALCULATED.3IONS-SCNC: 4 MMOL/L
BACTERIA UR QL AUTO: ABNORMAL /HPF
BILIRUB UR QL STRIP: NEGATIVE
BUN SERPL-MCNC: 11 MG/DL (ref 5–25)
CALCIUM SERPL-MCNC: 9.7 MG/DL (ref 8.3–10.1)
CHLORIDE SERPL-SCNC: 105 MMOL/L (ref 96–108)
CLARITY UR: CLEAR
CO2 SERPL-SCNC: 29 MMOL/L (ref 21–32)
COLOR UR: ABNORMAL
CREAT SERPL-MCNC: 0.72 MG/DL (ref 0.6–1.3)
CREAT UR-MCNC: 50.8 MG/DL
CREAT UR-MCNC: 50.8 MG/DL
GFR SERPL CREATININE-BSD FRML MDRD: 88 ML/MIN/1.73SQ M
GLUCOSE SERPL-MCNC: 126 MG/DL (ref 65–140)
GLUCOSE UR STRIP-MCNC: ABNORMAL MG/DL
HGB UR QL STRIP.AUTO: NEGATIVE
KETONES UR STRIP-MCNC: NEGATIVE MG/DL
LEUKOCYTE ESTERASE UR QL STRIP: NEGATIVE
MICROALBUMIN UR-MCNC: <5 MG/L (ref 0–20)
MICROALBUMIN/CREAT 24H UR: <10 MG/G CREATININE (ref 0–30)
NITRITE UR QL STRIP: NEGATIVE
NON-SQ EPI CELLS URNS QL MICRO: ABNORMAL /HPF
PH UR STRIP.AUTO: 5.5 [PH]
PHOSPHATE SERPL-MCNC: 3.5 MG/DL (ref 2.3–4.1)
POTASSIUM SERPL-SCNC: 4.4 MMOL/L (ref 3.5–5.3)
PROT UR STRIP-MCNC: NEGATIVE MG/DL
PROT UR-MCNC: <6 MG/DL
RBC #/AREA URNS AUTO: ABNORMAL /HPF
SODIUM SERPL-SCNC: 138 MMOL/L (ref 135–147)
SP GR UR STRIP.AUTO: 1.01 (ref 1–1.03)
UROBILINOGEN UR STRIP-ACNC: <2 MG/DL
WBC #/AREA URNS AUTO: ABNORMAL /HPF

## 2023-08-10 PROCEDURE — 81001 URINALYSIS AUTO W/SCOPE: CPT | Performed by: INTERNAL MEDICINE

## 2023-08-10 PROCEDURE — 84156 ASSAY OF PROTEIN URINE: CPT | Performed by: INTERNAL MEDICINE

## 2023-08-10 PROCEDURE — 82570 ASSAY OF URINE CREATININE: CPT | Performed by: INTERNAL MEDICINE

## 2023-08-10 PROCEDURE — 80069 RENAL FUNCTION PANEL: CPT

## 2023-08-10 PROCEDURE — 36415 COLL VENOUS BLD VENIPUNCTURE: CPT

## 2023-08-10 PROCEDURE — 82043 UR ALBUMIN QUANTITATIVE: CPT | Performed by: INTERNAL MEDICINE

## 2023-08-11 DIAGNOSIS — K74.69 OTHER CIRRHOSIS OF LIVER (HCC): Primary | ICD-10-CM

## 2023-08-14 ENCOUNTER — TELEPHONE (OUTPATIENT)
Dept: NEPHROLOGY | Facility: CLINIC | Age: 63
End: 2023-08-14

## 2023-08-14 DIAGNOSIS — I10 ESSENTIAL HYPERTENSION: Primary | ICD-10-CM

## 2023-08-14 DIAGNOSIS — R80.9 MICROALBUMINURIA: ICD-10-CM

## 2023-08-14 NOTE — TELEPHONE ENCOUNTER
Patient called back to verify receipt. States that she is completing 24 hour urine test currently. No further questions or concerns at this time.

## 2023-08-14 NOTE — TELEPHONE ENCOUNTER
Left voicemail for the patient relaying the message above. Requested patient call back with any questions or concerns, and to verify receipt.

## 2023-08-14 NOTE — TELEPHONE ENCOUNTER
----- Message from Joseph Macias MD sent at 8/14/2023 11:57 AM EDT -----  Please let the patient know that there is no protein in her urine by spot analysis. This is a very good sign. It would be best if she can complete the 24-hour urine protein test for confirmation. If that also remains negative for proteinuria, she would not need to see us for 1 year.

## 2023-08-15 ENCOUNTER — APPOINTMENT (OUTPATIENT)
Dept: LAB | Facility: CLINIC | Age: 63
End: 2023-08-15
Payer: COMMERCIAL

## 2023-08-15 DIAGNOSIS — I10 ESSENTIAL HYPERTENSION: ICD-10-CM

## 2023-08-15 DIAGNOSIS — E11.69 TYPE 2 DIABETES MELLITUS WITH OBESITY (HCC): ICD-10-CM

## 2023-08-15 DIAGNOSIS — E66.9 TYPE 2 DIABETES MELLITUS WITH OBESITY (HCC): ICD-10-CM

## 2023-08-15 LAB
CREAT 24H UR-MRATE: 1.4 G/24HR (ref 0.6–1.8)
PROT 24H UR-MCNC: <114 MG/24 HRS (ref 40–150)
SPECIMEN VOL UR: 1900 ML
SPECIMEN VOL UR: 1900 ML

## 2023-08-15 PROCEDURE — 82570 ASSAY OF URINE CREATININE: CPT

## 2023-08-15 PROCEDURE — 84156 ASSAY OF PROTEIN URINE: CPT

## 2023-08-16 NOTE — TELEPHONE ENCOUNTER
Bam Whittington MD [facility]   Please let the patient know that there is no significant protein in the urine which is a very good sign. She should repeat renal function panel/UACR/UPCR in 1 year and follow-up in 1 year.

## 2023-09-08 DIAGNOSIS — K74.69 OTHER CIRRHOSIS OF LIVER (HCC): ICD-10-CM

## 2023-09-08 RX ORDER — CARVEDILOL 12.5 MG/1
TABLET ORAL
Qty: 60 TABLET | Refills: 5 | Status: SHIPPED | OUTPATIENT
Start: 2023-09-08

## 2023-09-15 DIAGNOSIS — E78.2 MIXED HYPERLIPIDEMIA: ICD-10-CM

## 2023-09-15 DIAGNOSIS — I10 ESSENTIAL HYPERTENSION: ICD-10-CM

## 2023-09-15 NOTE — TELEPHONE ENCOUNTER
Pharmacy called patient wondering what was going on with these refills. Will route to clinical team please be advised. Reason for call:   [x] Refill   [] Prior Auth  [] Other:     Office:   [] PCP/Provider -   [x] Speciality/Provider -     Medication: Lipitor     Dose/Frequency: 20 mg/ 1 tab daily     Quantity: 90    Reason for call:   [x] Refill   [] Prior Auth  [] Other:     Office:   [] PCP/Provider -   [x] Speciality/Provider -     Medication: Lisinopril/hydrochlorothiazide    Dose/Frequency: 10-12.5 mg/ 1 tab daily     Quantity: 90    Pharmacy: [facility]     Does the patient have enough for 3 days?    [x] Yes   [] No - Send as HP to POD

## 2023-09-18 RX ORDER — LISINOPRIL AND HYDROCHLOROTHIAZIDE 12.5; 1 MG/1; MG/1
1 TABLET ORAL DAILY
Qty: 90 TABLET | Refills: 1 | Status: SHIPPED | OUTPATIENT
Start: 2023-09-18

## 2023-09-18 RX ORDER — ATORVASTATIN CALCIUM 20 MG/1
20 TABLET, FILM COATED ORAL DAILY
Qty: 90 TABLET | Refills: 1 | Status: SHIPPED | OUTPATIENT
Start: 2023-09-18

## 2023-10-19 ENCOUNTER — OFFICE VISIT (OUTPATIENT)
Dept: GASTROENTEROLOGY | Facility: CLINIC | Age: 63
End: 2023-10-19
Payer: COMMERCIAL

## 2023-10-19 VITALS
HEIGHT: 64 IN | DIASTOLIC BLOOD PRESSURE: 93 MMHG | BODY MASS INDEX: 44.39 KG/M2 | HEART RATE: 66 BPM | SYSTOLIC BLOOD PRESSURE: 128 MMHG | WEIGHT: 260 LBS

## 2023-10-19 DIAGNOSIS — K74.69 OTHER CIRRHOSIS OF LIVER (HCC): Primary | ICD-10-CM

## 2023-10-19 PROCEDURE — 99213 OFFICE O/P EST LOW 20 MIN: CPT | Performed by: INTERNAL MEDICINE

## 2023-10-19 NOTE — PROGRESS NOTES
[facility] - Outpatient Follow-up Note  Corina Noonan 57 y.o. female MRN: [id number]  Encounter: [id number]          ASSESSMENT AND PLAN:      1. Other cirrhosis of liver ([address] [facility])  Continue to work toward weight loss, alcohol abstinence, low-sodium diet. He will get a flu shot next week and was recommended to get COVID vaccine. Will be due for ultrasound in January, EGD next year and colonoscopy in 2027 for screening. Continue carvedilol. Check MELD labs    - CBC; Future  - Comprehensive metabolic panel; Future  - Protime-INR; Future    ______________________________________________________________________    SUBJECTIVE: Doing very well with no complaints. Has not managed to use 3-4 more pounds slowly through dietary discretion. No abdominal pain, nausea vomiting, fever chills, jaundice or rash, change in bowel habit. Most recent MELD 7 in July. Ultrasound with no hepatoma.       REVIEW OF SYSTEMS:    ROS       Historical Information   Past Medical History:   Diagnosis Date    Cirrhosis ([address] [facility])     Diabetes mellitus ([facility])     Ear problems     GERD (gastroesophageal reflux disease)     HL (hearing loss)     Hypertension     Seasonal allergies     Tinnitus      Past Surgical History:   Procedure Laterality Date    ACHILLES TENDON REPAIR Right     COLONOSCOPY      HYSTERECTOMY      age 41    UPPER GASTROINTESTINAL ENDOSCOPY       Social History   Social History     Substance and Sexual Activity   Alcohol Use No    Comment: Sober for 20 years     Social History     Substance and Sexual Activity   Drug Use No     Social History     Tobacco Use   Smoking Status Every Day    Packs/day: 0.25    Years: 40.00    Total pack years: 10.00    Types: Cigarettes   Smokeless Tobacco Never     Family History   Problem Relation Age of Onset    Lung cancer Sister     Cancer Sister     Hypertension Mother     No Known Problems Father     No Known Problems Maternal Grandmother     No Known Problems Maternal Grandfather     No Known Problems Paternal Grandmother     No Known Problems Paternal Grandfather     No Known Problems Brother     No Known Problems Brother        Meds/Allergies       Current Outpatient Medications:     ALPRAZolam (XANAX) 0.25 mg tablet    atorvastatin (LIPITOR) 20 mg tablet    carvedilol (COREG) 12.5 mg tablet    lisinopril-hydrochlorothiazide (PRINZIDE,ZESTORETIC) 10-12.5 MG per tablet    metFORMIN (GLUCOPHAGE) 500 mg tablet    Multiple Vitamin (multivitamin) capsule    Accu-Chek Guide test strip    cetirizine (ZyrTEC) 10 mg tablet    Allergies   Allergen Reactions    Penicillins            Objective     Blood pressure 128/93, pulse 66, height 5' 4" (1.626 m), weight 118 kg (260 lb). Body mass index is 44.63 kg/m². PHYSICAL EXAM:      Physical Exam  Vitals and nursing note reviewed. Constitutional:       General: She is not in acute distress. Appearance: She is not ill-appearing. HENT:      Head: Normocephalic and atraumatic. Eyes:      General: No scleral icterus. Extraocular Movements: Extraocular movements intact. Cardiovascular:      Rate and Rhythm: Normal rate. Pulmonary:      Effort: Pulmonary effort is normal. No respiratory distress. Abdominal:      General: There is no distension. Tenderness: There is no abdominal tenderness. There is no guarding or rebound. Musculoskeletal:      Right lower leg: No edema. Left lower leg: No edema. Skin:     General: Skin is warm and dry. Coloration: Skin is not cyanotic. Findings: No erythema. Neurological:      General: No focal deficit present. Mental Status: She is alert and oriented to person, place, and time. Psychiatric:         Mood and Affect: Mood normal.         Behavior: Behavior normal.          Lab Results:   No visits with results within 1 Day(s) from this visit.    Latest known visit with results is:   Appointment on 08/15/2023   Component Date Value    24H Urine Volume 08/15/2023 1,900     Protein, 24H Urine 08/15/2023 <114     Creatinine, 24H Ur 08/15/2023 1.4     TOTAL URINE VOLUME 08/15/2023 1,900          Radiology Results:   No results found.

## 2023-10-20 DIAGNOSIS — F41.9 ANXIETY: ICD-10-CM

## 2023-10-20 NOTE — TELEPHONE ENCOUNTER
Requested medication(s) are due for refill today: Yes  Patient has already received a courtesy refill: No  Other reason request has been forwarded to provider: Refill must be reviewed and completed by the office or provider.  The refill is unable to be approved by the medication management team.

## 2023-10-23 RX ORDER — ALPRAZOLAM 0.25 MG/1
0.25 TABLET ORAL
Qty: 30 TABLET | Refills: 0 | Status: SHIPPED | OUTPATIENT
Start: 2023-10-23

## 2023-11-03 ENCOUNTER — HOSPITAL ENCOUNTER (OUTPATIENT)
Dept: MAMMOGRAPHY | Facility: HOSPITAL | Age: 63
Discharge: HOME | End: 2023-11-03
Payer: MEDICARE

## 2023-11-03 VITALS — BODY MASS INDEX: 44.39 KG/M2 | HEIGHT: 64 IN | WEIGHT: 260 LBS

## 2023-11-03 DIAGNOSIS — Z12.31 ENCOUNTER FOR SCREENING MAMMOGRAM FOR BREAST CANCER: ICD-10-CM

## 2023-11-03 PROCEDURE — 77063 BREAST TOMOSYNTHESIS BI: CPT

## 2023-11-03 PROCEDURE — 77067 SCR MAMMO BI INCL CAD: CPT

## 2023-11-08 ENCOUNTER — TELEPHONE (OUTPATIENT)
Dept: FAMILY MEDICINE CLINIC | Facility: CLINIC | Age: 63
End: 2023-11-08

## 2023-11-08 NOTE — TELEPHONE ENCOUNTER
----- Message from Moriah Walker, [address] [place] [address] sent at 11/8/2023  8:47 AM EST -----  Please let the patient know that her mammogram showed no evidence of malignancy.

## 2023-11-22 ENCOUNTER — VBI (OUTPATIENT)
Dept: ADMINISTRATIVE | Facility: OTHER | Age: 63
End: 2023-11-22

## 2023-12-27 ENCOUNTER — VBI (OUTPATIENT)
Dept: ADMINISTRATIVE | Facility: OTHER | Age: 63
End: 2023-12-27

## 2024-01-05 ENCOUNTER — RA CDI HCC (OUTPATIENT)
Dept: OTHER | Facility: HOSPITAL | Age: 64
End: 2024-01-05

## 2024-01-10 ENCOUNTER — APPOINTMENT (OUTPATIENT)
Dept: LAB | Facility: CLINIC | Age: 64
End: 2024-01-10
Payer: MEDICARE

## 2024-01-10 DIAGNOSIS — E66.9 DIABETES MELLITUS TYPE 2 IN OBESE: ICD-10-CM

## 2024-01-10 DIAGNOSIS — E11.69 DIABETES MELLITUS TYPE 2 IN OBESE: ICD-10-CM

## 2024-01-10 DIAGNOSIS — E66.01 MORBID OBESITY (HCC): ICD-10-CM

## 2024-01-10 DIAGNOSIS — E78.2 MIXED HYPERLIPIDEMIA: ICD-10-CM

## 2024-01-10 DIAGNOSIS — I10 ESSENTIAL HYPERTENSION: ICD-10-CM

## 2024-01-10 DIAGNOSIS — K74.69 OTHER CIRRHOSIS OF LIVER (HCC): ICD-10-CM

## 2024-01-10 LAB
ALBUMIN SERPL BCP-MCNC: 4.3 G/DL (ref 3.5–5)
ALP SERPL-CCNC: 83 U/L (ref 34–104)
ALT SERPL W P-5'-P-CCNC: 27 U/L (ref 7–52)
ANION GAP SERPL CALCULATED.3IONS-SCNC: 11 MMOL/L
AST SERPL W P-5'-P-CCNC: 31 U/L (ref 13–39)
BASOPHILS # BLD AUTO: 0.05 THOUSANDS/ÂΜL (ref 0–0.1)
BASOPHILS NFR BLD AUTO: 1 % (ref 0–1)
BILIRUB SERPL-MCNC: 0.87 MG/DL (ref 0.2–1)
BUN SERPL-MCNC: 9 MG/DL (ref 5–25)
CALCIUM SERPL-MCNC: 9.5 MG/DL (ref 8.4–10.2)
CHLORIDE SERPL-SCNC: 103 MMOL/L (ref 96–108)
CHOLEST SERPL-MCNC: 131 MG/DL
CO2 SERPL-SCNC: 26 MMOL/L (ref 21–32)
CREAT SERPL-MCNC: 0.65 MG/DL (ref 0.6–1.3)
EOSINOPHIL # BLD AUTO: 0.13 THOUSAND/ÂΜL (ref 0–0.61)
EOSINOPHIL NFR BLD AUTO: 2 % (ref 0–6)
ERYTHROCYTE [DISTWIDTH] IN BLOOD BY AUTOMATED COUNT: 12.3 % (ref 11.6–15.1)
EST. AVERAGE GLUCOSE BLD GHB EST-MCNC: 163 MG/DL
GFR SERPL CREATININE-BSD FRML MDRD: 93 ML/MIN/1.73SQ M
GLUCOSE P FAST SERPL-MCNC: 156 MG/DL (ref 65–99)
HBA1C MFR BLD: 7.3 %
HCT VFR BLD AUTO: 47.3 % (ref 34.8–46.1)
HDLC SERPL-MCNC: 44 MG/DL
HGB BLD-MCNC: 15.3 G/DL (ref 11.5–15.4)
IMM GRANULOCYTES # BLD AUTO: 0.01 THOUSAND/UL (ref 0–0.2)
IMM GRANULOCYTES NFR BLD AUTO: 0 % (ref 0–2)
INR PPP: 1.09 (ref 0.84–1.19)
LDLC SERPL CALC-MCNC: 63 MG/DL (ref 0–100)
LYMPHOCYTES # BLD AUTO: 1.42 THOUSANDS/ÂΜL (ref 0.6–4.47)
LYMPHOCYTES NFR BLD AUTO: 23 % (ref 14–44)
MCH RBC QN AUTO: 31 PG (ref 26.8–34.3)
MCHC RBC AUTO-ENTMCNC: 32.3 G/DL (ref 31.4–37.4)
MCV RBC AUTO: 96 FL (ref 82–98)
MONOCYTES # BLD AUTO: 0.4 THOUSAND/ÂΜL (ref 0.17–1.22)
MONOCYTES NFR BLD AUTO: 7 % (ref 4–12)
NEUTROPHILS # BLD AUTO: 4.07 THOUSANDS/ÂΜL (ref 1.85–7.62)
NEUTS SEG NFR BLD AUTO: 67 % (ref 43–75)
NRBC BLD AUTO-RTO: 0 /100 WBCS
PLATELET # BLD AUTO: 160 THOUSANDS/UL (ref 149–390)
PMV BLD AUTO: 10.7 FL (ref 8.9–12.7)
POTASSIUM SERPL-SCNC: 3.8 MMOL/L (ref 3.5–5.3)
PROT SERPL-MCNC: 7.1 G/DL (ref 6.4–8.4)
PROTHROMBIN TIME: 14 SECONDS (ref 11.6–14.5)
RBC # BLD AUTO: 4.94 MILLION/UL (ref 3.81–5.12)
SODIUM SERPL-SCNC: 140 MMOL/L (ref 135–147)
TRIGL SERPL-MCNC: 121 MG/DL
WBC # BLD AUTO: 6.08 THOUSAND/UL (ref 4.31–10.16)

## 2024-01-10 PROCEDURE — 80053 COMPREHEN METABOLIC PANEL: CPT

## 2024-01-10 PROCEDURE — 83036 HEMOGLOBIN GLYCOSYLATED A1C: CPT

## 2024-01-10 PROCEDURE — 85610 PROTHROMBIN TIME: CPT

## 2024-01-10 PROCEDURE — 36415 COLL VENOUS BLD VENIPUNCTURE: CPT

## 2024-01-10 PROCEDURE — 85025 COMPLETE CBC W/AUTO DIFF WBC: CPT

## 2024-01-10 PROCEDURE — 80061 LIPID PANEL: CPT

## 2024-01-11 ENCOUNTER — OFFICE VISIT (OUTPATIENT)
Dept: FAMILY MEDICINE CLINIC | Facility: CLINIC | Age: 64
End: 2024-01-11

## 2024-01-11 VITALS
SYSTOLIC BLOOD PRESSURE: 130 MMHG | HEIGHT: 64 IN | OXYGEN SATURATION: 96 % | BODY MASS INDEX: 44.9 KG/M2 | DIASTOLIC BLOOD PRESSURE: 72 MMHG | HEART RATE: 65 BPM | WEIGHT: 263 LBS | RESPIRATION RATE: 16 BRPM

## 2024-01-11 DIAGNOSIS — E78.2 MIXED HYPERLIPIDEMIA: ICD-10-CM

## 2024-01-11 DIAGNOSIS — E11.65 TYPE 2 DIABETES MELLITUS WITH HYPERGLYCEMIA, WITHOUT LONG-TERM CURRENT USE OF INSULIN (HCC): ICD-10-CM

## 2024-01-11 DIAGNOSIS — K74.69 OTHER CIRRHOSIS OF LIVER (HCC): ICD-10-CM

## 2024-01-11 DIAGNOSIS — F41.9 ANXIETY: ICD-10-CM

## 2024-01-11 DIAGNOSIS — Z00.00 WELCOME TO MEDICARE PREVENTIVE VISIT: ICD-10-CM

## 2024-01-11 DIAGNOSIS — I10 ESSENTIAL HYPERTENSION: ICD-10-CM

## 2024-01-11 DIAGNOSIS — J01.90 ACUTE SINUSITIS, RECURRENCE NOT SPECIFIED, UNSPECIFIED LOCATION: Primary | ICD-10-CM

## 2024-01-11 DIAGNOSIS — E66.01 MORBID OBESITY (HCC): ICD-10-CM

## 2024-01-11 PROBLEM — D69.6 THROMBOCYTOPENIA, UNSPECIFIED (HCC): Status: RESOLVED | Noted: 2020-12-21 | Resolved: 2024-01-11

## 2024-01-11 PROBLEM — E11.9 DIABETES MELLITUS (HCC): Status: ACTIVE | Noted: 2024-01-11

## 2024-01-11 PROBLEM — Z23 ENCOUNTER FOR IMMUNIZATION: Status: ACTIVE | Noted: 2024-01-11

## 2024-01-11 RX ORDER — CEFDINIR 300 MG/1
300 CAPSULE ORAL EVERY 12 HOURS SCHEDULED
Qty: 14 CAPSULE | Refills: 0 | Status: SHIPPED | OUTPATIENT
Start: 2024-01-11 | End: 2024-01-18

## 2024-01-11 NOTE — PATIENT INSTRUCTIONS
Medicare Preventive Visit Patient Instructions  Thank you for completing your Welcome to Medicare Visit or Medicare Annual Wellness Visit today. Your next wellness visit will be due in one year (1/11/2025).  The screening/preventive services that you may require over the next 5-10 years are detailed below. Some tests may not apply to you based off risk factors and/or age. Screening tests ordered at today's visit but not completed yet may show as past due. Also, please note that scanned in results may not display below.  Preventive Screenings:  Service Recommendations Previous Testing/Comments   Colorectal Cancer Screening  * Colonoscopy    * Fecal Occult Blood Test (FOBT)/Fecal Immunochemical Test (FIT)  * Fecal DNA/Cologuard Test  * Flexible Sigmoidoscopy Age: 45-75 years old   Colonoscopy: every 10 years (may be performed more frequently if at higher risk)  OR  FOBT/FIT: every 1 year  OR  Cologuard: every 3 years  OR  Sigmoidoscopy: every 5 years  Screening may be recommended earlier than age 45 if at higher risk for colorectal cancer. Also, an individualized decision between you and your healthcare provider will decide whether screening between the ages of 76-85 would be appropriate. Colonoscopy: 10/09/2017  FOBT/FIT: Not on file  Cologuard: Not on file  Sigmoidoscopy: Not on file    Screening Current     Breast Cancer Screening Age: 40+ years old  Frequency: every 1-2 years  Not required if history of left and right mastectomy Mammogram: 11/03/2023    Screening Current   Cervical Cancer Screening Between the ages of 21-29, pap smear recommended once every 3 years.   Between the ages of 30-65, can perform pap smear with HPV co-testing every 5 years.   Recommendations may differ for women with a history of total hysterectomy, cervical cancer, or abnormal pap smears in past. Pap Smear: 05/21/2018    Screening Not Indicated   Hepatitis C Screening Once for adults born between 1945 and 1965  More frequently in  patients at high risk for Hepatitis C Hep C Antibody: 05/29/2019    Screening Current   Diabetes Screening 1-2 times per year if you're at risk for diabetes or have pre-diabetes Fasting glucose: 156 mg/dL (1/10/2024)  A1C: 7.3 % (1/10/2024)  Screening Not Indicated  History Diabetes   Cholesterol Screening Once every 5 years if you don't have a lipid disorder. May order more often based on risk factors. Lipid panel: 01/10/2024    Screening Not Indicated  History Lipid Disorder     Other Preventive Screenings Covered by Medicare:  Abdominal Aortic Aneurysm (AAA) Screening: covered once if your at risk. You're considered to be at risk if you have a family history of AAA.  Lung Cancer Screening: covers low dose CT scan once per year if you meet all of the following conditions: (1) Age 55-77; (2) No signs or symptoms of lung cancer; (3) Current smoker or have quit smoking within the last 15 years; (4) You have a tobacco smoking history of at least 20 pack years (packs per day multiplied by number of years you smoked); (5) You get a written order from a healthcare provider.  Glaucoma Screening: covered annually if you're considered high risk: (1) You have diabetes OR (2) Family history of glaucoma OR (3)  aged 50 and older OR (4)  American aged 65 and older  Osteoporosis Screening: covered every 2 years if you meet one of the following conditions: (1) You're estrogen deficient and at risk for osteoporosis based off medical history and other findings; (2) Have a vertebral abnormality; (3) On glucocorticoid therapy for more than 3 months; (4) Have primary hyperparathyroidism; (5) On osteoporosis medications and need to assess response to drug therapy.   Last bone density test (DXA Scan): Not on file.  HIV Screening: covered annually if you're between the age of 15-65. Also covered annually if you are younger than 15 and older than 65 with risk factors for HIV infection. For pregnant patients, it is  covered up to 3 times per pregnancy.    Immunizations:  Immunization Recommendations   Influenza Vaccine Annual influenza vaccination during flu season is recommended for all persons aged >= 6 months who do not have contraindications   Pneumococcal Vaccine   * Pneumococcal conjugate vaccine = PCV13 (Prevnar 13), PCV15 (Vaxneuvance), PCV20 (Prevnar 20)  * Pneumococcal polysaccharide vaccine = PPSV23 (Pneumovax) Adults 19-63 yo with certain risk factors or if 65+ yo  If never received any pneumonia vaccine: recommend Prevnar 20 (PCV20)  Give PCV20 if previously received 1 dose of PCV13 or PPSV23   Hepatitis B Vaccine 3 dose series if at intermediate or high risk (ex: diabetes, end stage renal disease, liver disease)   Respiratory syncytial virus (RSV) Vaccine - COVERED BY MEDICARE PART D  * RSVPreF3 (Arexvy) CDC recommends that adults 60 years of age and older may receive a single dose of RSV vaccine using shared clinical decision-making (SCDM)   Tetanus (Td) Vaccine - COST NOT COVERED BY MEDICARE PART B Following completion of primary series, a booster dose should be given every 10 years to maintain immunity against tetanus. Td may also be given as tetanus wound prophylaxis.   Tdap Vaccine - COST NOT COVERED BY MEDICARE PART B Recommended at least once for all adults. For pregnant patients, recommended with each pregnancy.   Shingles Vaccine (Shingrix) - COST NOT COVERED BY MEDICARE PART B  2 shot series recommended in those 19 years and older who have or will have weakened immune systems or those 50 years and older     Health Maintenance Due:      Topic Date Due   • HIV Screening  Never done   • Breast Cancer Screening: Mammogram  11/03/2024   • Colorectal Cancer Screening  10/09/2027   • Hepatitis C Screening  Completed     Immunizations Due:      Topic Date Due   • Pneumococcal Vaccine: 65+ Years (2 - PPSV23 or PCV20) 08/06/2019   • Influenza Vaccine (1) 09/01/2023   • COVID-19 Vaccine (5 - 2023-24 season)  09/01/2023     Advance Directives   What are advance directives?  Advance directives are legal documents that state your wishes and plans for medical care. These plans are made ahead of time in case you lose your ability to make decisions for yourself. Advance directives can apply to any medical decision, such as the treatments you want, and if you want to donate organs.   What are the types of advance directives?  There are many types of advance directives, and each state has rules about how to use them. You may choose a combination of any of the following:  Living will:  This is a written record of the treatment you want. You can also choose which treatments you do not want, which to limit, and which to stop at a certain time. This includes surgery, medicine, IV fluid, and tube feedings.   Durable power of  for healthcare (DPAHC):  This is a written record that states who you want to make healthcare choices for you when you are unable to make them for yourself. This person, called a proxy, is usually a family member or a friend. You may choose more than 1 proxy.  Do not resuscitate (DNR) order:  A DNR order is used in case your heart stops beating or you stop breathing. It is a request not to have certain forms of treatment, such as CPR. A DNR order may be included in other types of advance directives.  Medical directive:  This covers the care that you want if you are in a coma, near death, or unable to make decisions for yourself. You can list the treatments you want for each condition. Treatment may include pain medicine, surgery, blood transfusions, dialysis, IV or tube feedings, and a ventilator (breathing machine).  Values history:  This document has questions about your views, beliefs, and how you feel and think about life. This information can help others choose the care that you would choose.  Why are advance directives important?  An advance directive helps you control your care. Although spoken  wishes may be used, it is better to have your wishes written down. Spoken wishes can be misunderstood, or not followed. Treatments may be given even if you do not want them. An advance directive may make it easier for your family to make difficult choices about your care.   Cigarette Smoking and Your Health   Risks to your health if you smoke:  Nicotine and other chemicals found in tobacco damage every cell in your body. Even if you are a light smoker, you have an increased risk for cancer, heart disease, and lung disease. If you are pregnant or have diabetes, smoking increases your risk for complications.   Benefits to your health if you stop smoking:   You decrease respiratory symptoms such as coughing, wheezing, and shortness of breath.   You reduce your risk for cancers of the lung, mouth, throat, kidney, bladder, pancreas, stomach, and cervix. If you already have cancer, you increase the benefits of chemotherapy. You also reduce your risk for cancer returning or a second cancer from developing.   You reduce your risk for heart disease, blood clots, heart attack, and stroke.   You reduce your risk for lung infections, and diseases such as pneumonia, asthma, chronic bronchitis, and emphysema.  Your circulation improves. More oxygen can be delivered to your body. If you have diabetes, you lower your risk for complications, such as kidney, artery, and eye diseases. You also lower your risk for nerve damage. Nerve damage can lead to amputations, poor vision, and blindness.  You improve your body's ability to heal and to fight infections.  For more information and support to stop smoking:   [employer]  Phone: 0- 719 - 970-1092  Web Address: www.[employer]  Weight Management   Why it is important to manage your weight:  Being overweight increases your risk of health conditions such as heart disease, high blood pressure, type 2 diabetes, and certain types of cancer. It can also increase your risk for  osteoarthritis, sleep apnea, and other respiratory problems. Aim for a slow, steady weight loss. Even a small amount of weight loss can lower your risk of health problems.  How to lose weight safely:  A safe and healthy way to lose weight is to eat fewer calories and get regular exercise. You can lose up about 1 pound a week by decreasing the number of calories you eat by 500 calories each day.   Healthy meal plan for weight management:  A healthy meal plan includes a variety of foods, contains fewer calories, and helps you stay healthy. A healthy meal plan includes the following:  Eat whole-grain foods more often.  A healthy meal plan should contain fiber. Fiber is the part of grains, fruits, and vegetables that is not broken down by your body. Whole-grain foods are healthy and provide extra fiber in your diet. Some examples of whole-grain foods are whole-wheat breads and pastas, oatmeal, brown rice, and bulgur.  Eat a variety of vegetables every day.  Include dark, leafy greens such as spinach, kale, linsey greens, and mustard greens. Eat yellow and orange vegetables such as carrots, sweet potatoes, and winter squash.   Eat a variety of fruits every day.  Choose fresh or canned fruit (canned in its own juice or light syrup) instead of juice. Fruit juice has very little or no fiber.  Eat low-fat dairy foods.  Drink fat-free (skim) milk or 1% milk. Eat fat-free yogurt and low-fat cottage cheese. Try low-fat cheeses such as mozzarella and other reduced-fat cheeses.  Choose meat and other protein foods that are low in fat.  Choose beans or other legumes such as split peas or lentils. Choose fish, skinless poultry (chicken or turkey), or lean cuts of red meat (beef or pork). Before you cook meat or poultry, cut off any visible fat.   Use less fat and oil.  Try baking foods instead of frying them. Add less fat, such as margarine, sour cream, regular salad dressing and mayonnaise to foods. Eat fewer high-fat foods. Some  examples of high-fat foods include french fries, doughnuts, ice cream, and cakes.  Eat fewer sweets.  Limit foods and drinks that are high in sugar. This includes candy, cookies, regular soda, and sweetened drinks.  Exercise:  Exercise at least 30 minutes per day on most days of the week. Some examples of exercise include walking, biking, dancing, and swimming. You can also fit in more physical activity by taking the stairs instead of the elevator or parking farther away from stores. Ask your healthcare provider about the best exercise plan for you.      © Copyright [employer] 2018 Information is for End User's use only and may not be sold, redistributed or otherwise used for commercial purposes. All illustrations and images included in CareNotes® are the copyrighted property of [employer] or [employer]

## 2024-01-11 NOTE — PROGRESS NOTES
Assessment and Plan:     Problem List Items Addressed This Visit          Digestive    Other cirrhosis of liver (HCC)  Patient follows up with gastroenterology.        Endocrine    Diabetes mellitus (HCC)    Relevant Orders    Hemoglobin A1C    Comprehensive metabolic panel    CBC and differential    Lipid Panel with Direct LDL reflex    Hemoglobin A1c is 7.3.   Patient would like to work on her diet more.   Patient encouraged to eat a healthy low fat/ diabetic diet.   Continue metformin as prescribed.   Repeat hemoglobin A1c in 3 months         Respiratory    Acute sinusitis - Primary    Relevant Medications    cefdinir (OMNICEF) 300 mg capsule    Patient reports nasal congestion and sinus pressure for the past week.   Patient reports a little sore throat.   Denies any earache, cough, or vomiting.   Patient appears ill.   Nasal congestion noted.   Cefdinir prescribed for acute sinusitis. Medication information and side effects reviewed.   Proper hydration reviewed.   Patient instructed to follow-up if symptoms get worse or do not get better.          Cardiovascular and Mediastinum    Essential hypertension  Stable.   Continue current medications.        Other    Mixed hyperlipidemia    Relevant Orders    Comprehensive metabolic panel    CBC and differential    Lipid Panel with Direct LDL reflex    Total cholesterol 131, LDL 63.   Continue atorvastatin as prescribed.       Anxiety  Continue xanax prn.       Morbid obesity (HCC)    Relevant Orders    Comprehensive metabolic panel    CBC and differential    Lipid Panel with Direct LDL reflex    Patient encouraged to eat a healthy low fat/diabetic diet.       Welcome to Medicare preventive visit  Patient encouraged to eat a healthy low fat/diabetic diet.     Reviewed lab results with the patient.   Patient instructed to follow-up in 3 months or sooner prn.        Depression Screening and Follow-up Plan: Patient was screened for depression during today's encounter.  They screened negative with a PHQ-9 score of 0.    Tobacco Cessation Counseling: Tobacco cessation counseling was provided. The patient is sincerely urged to quit consumption of tobacco. She is not ready to quit tobacco.       Preventive health issues were discussed with patient, and age appropriate screening tests were ordered as noted in patient's After Visit Summary.  Personalized health advice and appropriate referrals for health education or preventive services given if needed, as noted in patient's After Visit Summary.     History of Present Illness:     Patient presents for a Medicare Wellness Visit    Patient is here for a follow-up for chronic medical conditions.   Patient reports nasal congestion and sinus pressure for the past week.   Patient reports that she felt feverish in the beginning.   Patient reports a little sore throat.   Denies any earache, cough, or vomiting.   Patient reports that she took tums OTC.   Patient reports that her symptoms are not going away.     Patient is here for a follow-up for type 2 DM.   Patient reports that she takes metformin as prescribed.     Patient follows up with gastroenterology for cirrhosis.   Patient takes atorvastatin for hyperlipidemia.   Patient takes lisinopril-HCTZ for HTN. Denies any chest pain or SOB.     Patient takes xanax prn for anxiety and it helps.   Patient reports that she rarely takes it.   Denies any depression or suicidal thoughts.          Patient Care Team:  SHAWNA Diallo as PCP - General (Family Medicine)  Seth Jensen PA-C as PCP - PCP-[facility] Magdy-Margie Cramer MD (Gastroenterology)     Review of Systems:     Review of Systems   Constitutional:  Negative for chills and fever.   HENT:  Positive for congestion, sinus pressure and sore throat. Negative for ear pain.    Respiratory:  Negative for chest tightness, shortness of breath and wheezing.    Cardiovascular:  Negative for chest pain and  palpitations.   Gastrointestinal:  Negative for abdominal pain, blood in stool, diarrhea, nausea and vomiting.   Genitourinary:  Negative for dysuria, frequency and hematuria.   Skin:  Negative for rash.   Neurological:  Positive for headaches (sinus HAs). Negative for seizures and syncope.        Problem List:     Patient Active Problem List   Diagnosis    Class 3 severe obesity due to excess calories without serious comorbidity with body mass index (BMI) of 45.0 to 49.9 in adult (HCC)    Tobacco use    Other cirrhosis of liver (HCC)    Diabetes mellitus type 2 in obese     Essential hypertension    Mixed hyperlipidemia    Anxiety    Sciatica of left side    Diverticulosis    Leukopenia    Encounter for screening mammogram for breast cancer    Gastroesophageal reflux disease with esophagitis without hemorrhage    Grief    Morbid obesity (HCC)    Smoking    Sensation of fullness in right ear    Onychomycosis    Dependent rubor    Microalbuminuria    Medicare annual wellness visit, subsequent    Diabetes mellitus (HCC)    Encounter for immunization    Welcome to Medicare preventive visit    Acute sinusitis      Past Medical and Surgical History:     Past Medical History:   Diagnosis Date    Cirrhosis (HCC)     Diabetes mellitus (HCC)     Ear problems     GERD (gastroesophageal reflux disease)     HL (hearing loss)     Hypertension     Seasonal allergies     Thrombocytopenia, unspecified (HCC)     Per CMS ICD 10 Guidelines--Per Phyisician    Tinnitus      Past Surgical History:   Procedure Laterality Date    ACHILLES TENDON REPAIR Right     COLONOSCOPY      HYSTERECTOMY      age 40    UPPER GASTROINTESTINAL ENDOSCOPY        Family History:     Family History   Problem Relation Age of Onset    Lung cancer Sister     Cancer Sister     Hypertension Mother     No Known Problems Father     No Known Problems Maternal Grandmother     No Known Problems Maternal Grandfather     No Known Problems Paternal Grandmother     No  Known Problems Paternal Grandfather     No Known Problems Brother     No Known Problems Brother       Social History:     Social History     Socioeconomic History    Marital status:      Spouse name: None    Number of children: None    Years of education: None    Highest education level: None   Occupational History    None   Tobacco Use    Smoking status: Every Day     Current packs/day: 0.25     Average packs/day: 0.3 packs/day for 40.0 years (10.0 ttl pk-yrs)     Types: Cigarettes    Smokeless tobacco: Never   Vaping Use    Vaping status: Never Used   Substance and Sexual Activity    Alcohol use: No     Comment: Sober for 20 years    Drug use: No    Sexual activity: Not Currently     Partners: Male     Birth control/protection: None   Other Topics Concern    None   Social History Narrative    Work - [employer]     Social Determinants of Health     Financial Resource Strain: Low Risk  (1/11/2024)    Overall Financial Resource Strain (CARDIA)     Difficulty of Paying Living Expenses: Not hard at all   Food Insecurity: Not on file   Transportation Needs: No Transportation Needs (1/11/2024)    PRAPARE - Transportation     Lack of Transportation (Medical): No     Lack of Transportation (Non-Medical): No   Physical Activity: Not on file   Stress: Not on file   Social Connections: Not on file   Intimate Partner Violence: Not on file   Housing Stability: Not on file      Medications and Allergies:     Current Outpatient Medications   Medication Sig Dispense Refill    cefdinir (OMNICEF) 300 mg capsule Take 1 capsule (300 mg total) by mouth every 12 (twelve) hours for 7 days 14 capsule 0    Accu-Chek Guide test strip USE TO CHECK BLOOD SUGAR ONCE DAILY AS DIRECTED (Patient not taking: Reported on 8/21/2023) 100 strip 1    ALPRAZolam (XANAX) 0.25 mg tablet Take 1 tablet (0.25 mg total) by mouth daily at bedtime as needed for anxiety 30 tablet 0    atorvastatin (LIPITOR) 20 mg tablet Take 1 tablet (20 mg  total) by mouth daily 90 tablet 1    carvedilol (COREG) 12.5 mg tablet TAKE ONE TABLET BY MOUTH TWICE A DAY WITH MEALS 60 tablet 5    cetirizine (ZyrTEC) 10 mg tablet Take 10 mg by mouth daily (Patient not taking: Reported on 10/19/2023)      lisinopril-hydrochlorothiazide (PRINZIDE,ZESTORETIC) 10-12.5 MG per tablet Take 1 tablet by mouth daily 90 tablet 1    metFORMIN (GLUCOPHAGE) 500 mg tablet TAKE ONE TABLET BY MOUTH TWICE A DAY WITH MEALS 180 tablet 1    Multiple Vitamin (multivitamin) capsule Take 1 capsule by mouth daily       No current facility-administered medications for this visit.     Allergies   Allergen Reactions    Penicillins       Immunizations:     Immunization History   Administered Date(s) Administered    COVID-19 PFIZER VACCINE 0.3 ML IM 03/03/2021, 03/24/2021    COVID-19 Pfizer vac (Sachin-sucrose, gray cap) 12 yr+ IM 12/07/2021, 09/19/2022    INFLUENZA 12/22/2015, 10/24/2017, 11/20/2018, 09/27/2019, 10/22/2021, 09/26/2022    Influenza, recombinant, quadrivalent,injectable, preservative free 11/20/2018    Influenza, seasonal, injectable, preservative free 09/09/2020    Pneumococcal Conjugate 13-Valent 06/11/2019    TD (adult) Preservative Free 04/12/2018    Tdap 04/18/2018      Health Maintenance:         Topic Date Due    HIV Screening  Never done    Breast Cancer Screening: Mammogram  11/03/2024    Colorectal Cancer Screening  10/09/2027    Hepatitis C Screening  Completed         Topic Date Due    Pneumococcal Vaccine: 65+ Years (2 - PPSV23 or PCV20) 08/06/2019    Influenza Vaccine (1) 09/01/2023    COVID-19 Vaccine (5 - 2023-24 season) 09/01/2023      Medicare Screening Tests and Risk Assessments:     Kristy is here for her Welcome to Medicare visit.     Health Risk Assessment:   Patient rates overall health as good. Patient feels that their physical health rating is same. Patient is satisfied with their life. Eyesight was rated as same. Hearing was rated as same. Patient feels that their  emotional and mental health rating is same. Patients states they are never, rarely angry. Patient states they are never, rarely unusually tired/fatigued. Pain experienced in the last 7 days has been none. Patient states that she has experienced no weight loss or gain in last 6 months.     Depression Screening:   PHQ-9 Score: 0      Fall Risk Screening:   In the past year, patient has experienced: no history of falling in past year      Urinary Incontinence Screening:   Patient has not leaked urine accidently in the last six months.     Home Safety:  Patient does not have trouble with stairs inside or outside of their home. Patient has working smoke alarms Home safety hazards include: none.     Nutrition:   Current diet is Diabetic.     Medications:   Patient is currently taking over-the-counter supplements. OTC medications include: see medication list. Patient is able to manage medications.     Activities of Daily Living (ADLs)/Instrumental Activities of Daily Living (IADLs):   Walk and transfer into and out of bed and chair?: Yes  Dress and groom yourself?: Yes    Bathe or shower yourself?: Yes    Feed yourself? Yes  Do your laundry/housekeeping?: Yes  Manage your money, pay your bills and track your expenses?: Yes  Make your own meals?: Yes    Do your own shopping?: Yes    Previous Hospitalizations:   Any hospitalizations or ED visits within the last 12 months?: No      Advance Care Planning:   Living will: No    Durable POA for healthcare: No    Advanced directive: No    Advanced directive counseling given: Yes      Cognitive Screening:   Provider or family/friend/caregiver concerned regarding cognition?: No    PREVENTIVE SCREENINGS      Cardiovascular Screening:    General: History Lipid Disorder and Screening Current      Diabetes Screening:     General: History Diabetes and Screening Current      Colorectal Cancer Screening:     General: Screening Current      Breast Cancer Screening:     General: Screening  "Current      Cervical Cancer Screening:    General: Screening Not Indicated      Osteoporosis Screening:    General: Risks and Benefits Discussed and Patient Declines      Abdominal Aortic Aneurysm (AAA) Screening:        General: Screening Not Indicated      Lung Cancer Screening:     General: Screening Not Indicated      Hepatitis C Screening:    General: Screening Current    Screening, Brief Intervention, and Referral to Treatment (SBIRT)    Screening  Typical number of drinks in a day: 0  Typical number of drinks in a week: 0  Interpretation: Low risk drinking behavior.    AUDIT-C Screenin) How often did you have a drink containing alcohol in the past year? never  2) How many drinks did you have on a typical day when you were drinking in the past year? 0  3) How often did you have 6 or more drinks on one occasion in the past year? never    AUDIT-C Score: 0  Interpretation: Score 0-2 (female): Negative screen for alcohol misuse    Single Item Drug Screening:  How often have you used an illegal drug (including marijuana) or a prescription medication for non-medical reasons in the past year? never    Single Item Drug Screen Score: 0  Interpretation: Negative screen for possible drug use disorder    Brief Intervention  Alcohol & drug use screenings were reviewed. No concerns regarding substance use disorder identified.     Other Counseling Topics:   Car/seat belt/driving safety, skin self-exam, sunscreen and calcium and vitamin D intake and regular weightbearing exercise.     Vision Screening    Right eye Left eye Both eyes   Without correction      With correction   20/30        Physical Exam:     /72   Pulse 65   Resp 16   Ht 5' 4\" (1.626 m)   Wt 119 kg (263 lb)   SpO2 96%   BMI 45.14 kg/m²     Physical Exam  Vitals reviewed.   Constitutional:       General: She is not in acute distress.     Appearance: She is obese. She is ill-appearing. She is not diaphoretic.   HENT:      Right Ear: Ear canal " and external ear normal.      Left Ear: Tympanic membrane, ear canal and external ear normal.      Nose: Congestion present.      Right Sinus: Maxillary sinus tenderness present.      Left Sinus: Maxillary sinus tenderness present.      Mouth/Throat:      Mouth: Mucous membranes are moist.      Pharynx: Oropharynx is clear. No oropharyngeal exudate or posterior oropharyngeal erythema.   Eyes:      Conjunctiva/sclera: Conjunctivae normal.      Pupils: Pupils are equal, round, and reactive to light.   Cardiovascular:      Rate and Rhythm: Normal rate and regular rhythm.      Pulses: Normal pulses.      Heart sounds: Normal heart sounds.   Pulmonary:      Effort: Pulmonary effort is normal. No respiratory distress.      Breath sounds: Normal breath sounds. No wheezing.   Abdominal:      General: There is no distension.      Palpations: Abdomen is soft. There is no mass.      Tenderness: There is no abdominal tenderness.   Musculoskeletal:      Comments: Gait wnl.    Lymphadenopathy:      Cervical: No cervical adenopathy.   Skin:     Findings: No rash.   Neurological:      Mental Status: She is alert and oriented to person, place, and time.      Cranial Nerves: No cranial nerve deficit.      Coordination: Coordination normal.      Gait: Gait normal.   Psychiatric:         Mood and Affect: Mood normal.          SHAWNA Diallo

## 2024-01-22 DIAGNOSIS — F41.9 ANXIETY: ICD-10-CM

## 2024-01-24 RX ORDER — ALPRAZOLAM 0.25 MG/1
0.25 TABLET ORAL
Qty: 30 TABLET | Refills: 0 | Status: SHIPPED | OUTPATIENT
Start: 2024-01-24

## 2024-02-12 ENCOUNTER — HOSPITAL ENCOUNTER (OUTPATIENT)
Dept: ULTRASOUND IMAGING | Facility: HOSPITAL | Age: 64
Discharge: HOME | End: 2024-02-12
Payer: MEDICARE

## 2024-02-12 DIAGNOSIS — K74.69 OTHER CIRRHOSIS OF LIVER (HCC): ICD-10-CM

## 2024-02-12 PROCEDURE — 76705 ECHO EXAM OF ABDOMEN: CPT

## 2024-02-21 PROBLEM — J01.90 ACUTE SINUSITIS: Status: RESOLVED | Noted: 2024-01-11 | Resolved: 2024-02-21

## 2024-02-21 PROBLEM — Z00.00 WELCOME TO MEDICARE PREVENTIVE VISIT: Status: RESOLVED | Noted: 2024-01-11 | Resolved: 2024-02-21

## 2024-02-21 PROBLEM — Z00.00 MEDICARE ANNUAL WELLNESS VISIT, SUBSEQUENT: Status: RESOLVED | Noted: 2024-01-11 | Resolved: 2024-02-21

## 2024-02-25 ENCOUNTER — OFFICE VISIT (OUTPATIENT)
Dept: URGENT CARE | Facility: CLINIC | Age: 64
End: 2024-02-25
Payer: MEDICARE

## 2024-02-25 VITALS
SYSTOLIC BLOOD PRESSURE: 132 MMHG | DIASTOLIC BLOOD PRESSURE: 70 MMHG | TEMPERATURE: 97.4 F | HEART RATE: 73 BPM | RESPIRATION RATE: 12 BRPM | BODY MASS INDEX: 44.9 KG/M2 | OXYGEN SATURATION: 96 % | WEIGHT: 263 LBS | HEIGHT: 64 IN

## 2024-02-25 DIAGNOSIS — M65.9 TENOSYNOVITIS OF FINGER: Primary | ICD-10-CM

## 2024-02-25 PROCEDURE — G0463 HOSPITAL OUTPT CLINIC VISIT: HCPCS | Performed by: PHYSICIAN ASSISTANT

## 2024-02-25 PROCEDURE — 99213 OFFICE O/P EST LOW 20 MIN: CPT | Performed by: PHYSICIAN ASSISTANT

## 2024-02-25 RX ORDER — IBUPROFEN 800 MG/1
800 TABLET ORAL EVERY 8 HOURS PRN
Qty: 30 TABLET | Refills: 0 | Status: SHIPPED | OUTPATIENT
Start: 2024-02-25

## 2024-02-25 NOTE — PROGRESS NOTES
[facility]        NAME: Kristy Brown is a 63 y.o. female  : 1960    MRN: [id number]  DATE: 2024  TIME: 12:01 PM    Assessment and Plan   Tenosynovitis of finger [M65.9]  1. Tenosynovitis of finger  ibuprofen (MOTRIN) 800 mg tablet            Patient Instructions       Follow up with PCP in 3-5 days.  Proceed to  ER if symptoms worsen.    Chief Complaint     Chief Complaint   Patient presents with    Hand Pain     Right hand pain and swelling started approx 1.5 weeks.          History of Present Illness       Patient is a 63 year old female coming in with right thumb and wrist pain for the last week and a half. Patient states it was sore prior to this but the past week and a half it has gotten much worse. Patient denies any known injury to that hand. Patient states she did break that wrist when she was 8 years old. Patient states she tried heat on it with minimal relief. Patient denies any numbness or tingling.     Hand Pain   The incident occurred more than 1 week ago. There was no injury mechanism. The pain is present in the right wrist and right fingers. The quality of the pain is described as aching. The pain radiates to the right arm. The pain has been Constant since the incident. Pertinent negatives include no chest pain, muscle weakness, numbness or tingling. The symptoms are aggravated by movement. She has tried heat for the symptoms. The treatment provided mild relief.       Review of Systems   Review of Systems   Constitutional:  Negative for chills and fever.   HENT:  Negative for ear pain and sore throat.    Eyes:  Negative for pain and visual disturbance.   Respiratory:  Negative for cough and shortness of breath.    Cardiovascular:  Negative for chest pain and palpitations.   Gastrointestinal:  Negative for abdominal pain and vomiting.   Genitourinary:  Negative for dysuria and hematuria.   Musculoskeletal:  Positive for arthralgias and myalgias. Negative for back  pain and joint swelling.   Skin:  Negative for color change and rash.   Neurological:  Negative for tingling, seizures, syncope and numbness.   All other systems reviewed and are negative.        Current Medications       Current Outpatient Medications:     ibuprofen (MOTRIN) 800 mg tablet, Take 1 tablet (800 mg total) by mouth every 8 (eight) hours as needed for mild pain, Disp: 30 tablet, Rfl: 0    Accu-Chek Guide test strip, USE TO CHECK BLOOD SUGAR ONCE DAILY AS DIRECTED (Patient not taking: Reported on 8/21/2023), Disp: 100 strip, Rfl: 1    ALPRAZolam (XANAX) 0.25 mg tablet, Take 1 tablet (0.25 mg total) by mouth daily at bedtime as needed for anxiety, Disp: 30 tablet, Rfl: 0    atorvastatin (LIPITOR) 20 mg tablet, Take 1 tablet (20 mg total) by mouth daily, Disp: 90 tablet, Rfl: 1    carvedilol (COREG) 12.5 mg tablet, TAKE ONE TABLET BY MOUTH TWICE A DAY WITH MEALS, Disp: 60 tablet, Rfl: 5    cetirizine (ZyrTEC) 10 mg tablet, Take 10 mg by mouth daily (Patient not taking: Reported on 10/19/2023), Disp: , Rfl:     lisinopril-hydrochlorothiazide (PRINZIDE,ZESTORETIC) 10-12.5 MG per tablet, Take 1 tablet by mouth daily, Disp: 90 tablet, Rfl: 1    metFORMIN (GLUCOPHAGE) 500 mg tablet, TAKE ONE TABLET BY MOUTH TWICE A DAY WITH MEALS, Disp: 180 tablet, Rfl: 1    Multiple Vitamin (multivitamin) capsule, Take 1 capsule by mouth daily, Disp: , Rfl:     Current Allergies     Allergies as of 02/25/2024 - Reviewed 02/25/2024   Allergen Reaction Noted    Penicillins  04/12/2018            The following portions of the patient's history were reviewed and updated as appropriate: allergies, current medications, past family history, past medical history, past social history, past surgical history and problem list.     Past Medical History:   Diagnosis Date    Cirrhosis (HCC)     Diabetes mellitus (HCC)     Ear problems     GERD (gastroesophageal reflux disease)     HL (hearing loss)     Hypertension     Seasonal allergies      "Thrombocytopenia, unspecified (HCC)     Per CMS ICD 10 Guidelines--Per Phyisician    Tinnitus        Past Surgical History:   Procedure Laterality Date    ACHILLES TENDON REPAIR Right     COLONOSCOPY      HYSTERECTOMY      age 40    UPPER GASTROINTESTINAL ENDOSCOPY         Family History   Problem Relation Age of Onset    Lung cancer Sister     Cancer Sister     Hypertension Mother     No Known Problems Father     No Known Problems Maternal Grandmother     No Known Problems Maternal Grandfather     No Known Problems Paternal Grandmother     No Known Problems Paternal Grandfather     No Known Problems Brother     No Known Problems Brother          Medications have been verified.        Objective   /70   Pulse 73   Temp (!) 97.4 °F (36.3 °C)   Resp 12   Ht 5' 4\" (1.626 m)   Wt 119 kg (263 lb)   SpO2 96%   BMI 45.14 kg/m²   No LMP recorded. Patient has had a hysterectomy.       Physical Exam     Physical Exam  Constitutional:       Appearance: Normal appearance.   HENT:      Head: Normocephalic and atraumatic.      Nose: Nose normal.      Mouth/Throat:      Mouth: Mucous membranes are dry.   Eyes:      Extraocular Movements: Extraocular movements intact.      Conjunctiva/sclera: Conjunctivae normal.      Pupils: Pupils are equal, round, and reactive to light.   Cardiovascular:      Rate and Rhythm: Normal rate and regular rhythm.      Pulses: Normal pulses.   Pulmonary:      Effort: Pulmonary effort is normal.      Breath sounds: Normal breath sounds. No wheezing, rhonchi or rales.   Musculoskeletal:         General: Tenderness present. No swelling, deformity or signs of injury.      Right wrist: Tenderness present. No swelling, deformity or bony tenderness. Decreased range of motion. Normal pulse.      Left wrist: Normal.      Right hand: Tenderness and bony tenderness present. No swelling. Decreased range of motion. Normal strength. Normal sensation.      Left hand: Normal.        Arms:       Cervical " back: Normal range of motion and neck supple.   Skin:     General: Skin is warm and dry.      Capillary Refill: Capillary refill takes less than 2 seconds.   Neurological:      General: No focal deficit present.      Mental Status: She is alert and oriented to person, place, and time.   Psychiatric:         Mood and Affect: Mood normal.         Behavior: Behavior normal.

## 2024-02-25 NOTE — PATIENT INSTRUCTIONS
Tenosynovitis   WHAT YOU NEED TO KNOW:   Tenosynovitis is inflammation of a tendon and its synovium. Tendons are cords of tissue that connect muscles to bones. The synovium is the lining of the sheath around the tendon. The tendons may become thickened and not slide smoothly through the swollen lining. The cause of tenosynovitis is not known.  DISCHARGE INSTRUCTIONS:   Medicines:   Pain medicines  may be given. Ask how to take this medicine safely.    NSAIDs , such as ibuprofen, help decrease swelling, pain, and fever. This medicine is available with or without a doctor's order. NSAIDs can cause stomach bleeding or kidney problems in certain people. If you take blood thinner medicine, always ask your healthcare provider if NSAIDs are safe for you. Always read the medicine label and follow directions.    Antibiotics  help treat a bacterial infection.    Antifungals  help treat a fungal infection.    Take your medicine as directed.  Contact your healthcare provider if you think your medicine is not helping or if you have side effects. Tell your provider if you are allergic to any medicine. Keep a list of the medicines, vitamins, and herbs you take. Include the amounts, and when and why you take them. Bring the list or the pill bottles to follow-up visits. Carry your medicine list with you in case of an emergency.    Follow up with your healthcare provider as directed:  Write down your questions so you remember to ask them during your visits.   Self-care:   Rest  your inflamed area as directed.    Apply ice  on your inflamed area for 15 to 20 minutes every hour or as directed. Use an ice pack, or put crushed ice in a plastic bag. Cover it with a towel. Ice helps prevent tissue damage and decreases swelling and pain.    Elevate  your inflamed area above the level of your heart as often as you can. This will help decrease swelling and pain. Prop your inflamed area on pillows or blankets to keep it elevated comfortably.      Use your assistive device as directed.  You may need a brace, splint, walking boot, or cast. You may also need to use crutches or orthotics. These devices prevent movement, decrease pain, and help your tendon heal.         Go to physical or occupational therapy.  A physical therapist teaches you exercises to help improve movement and strength and decrease pain. An occupational therapist teaches you skills to help with your daily activities.    Contact your healthcare provider if:   You have a fever.    A joint near your inflamed area is red and swollen.    Your symptoms do not go away or they get worse, even after treatment.    You have questions or concerns about your condition or care.    Return to the emergency department if:   You have sudden trouble breathing or chest pain.    Your arm, leg, fingers, or toes are numb, tingle, or are pale.    You hear or feel a pop.    You have severe pain.    © Copyright [employer] 2023 Information is for End User's use only and may not be sold, redistributed or otherwise used for commercial purposes.  The above information is an  only. It is not intended as medical advice for individual conditions or treatments. Talk to your doctor, nurse or pharmacist before following any medical regimen to see if it is safe and effective for you.  DeQuervain Release   AMBULATORY CARE:   What do I need to know about DeQuervain release?  DeQuervain release is surgery to cut the tendon sheath around your inflamed tendon. The tendon sheath forms a smooth tunnel that your tendons slide through when you move your thumb.  How do I prepare for surgery?  Your healthcare provider will talk to you about how to prepare for surgery. He or she will tell you not to eat or drink anything after midnight on the day of your surgery. He or she will tell you what medicines to take or not take on the day of surgery.  What will happen during surgery?  You may be given anesthesia to numb the  surgery area. You may still feel pressure and pushing during surgery, but you should not feel any pain. You may instead be given general anesthesia to keep you asleep during surgery. Your surgeon will make an incision over the wrist near the base of your thumb. He or she will cut the tendon sheath so your tendon can move more freely. He or she will close your incision with stitches or medical tape. Your surgeon may place a bandage over the incision for 24 to 48 hours.  What are the risks of surgery?  You may develop numbness from nerve damage during surgery. Your symptoms may not go away completely. You may develop an infection. The tendons may slip or catch. A large scar may develop. You may have long-term tenderness at or near the surgery area.  Call 911 for any of the following:   You have trouble breathing.    You have chest pain.    Seek care immediately if:   Blood soaks through your bandage.    Your incision comes apart.    Contact your healthcare provider if:   You have a fever or chills.    Your wound is red, swollen, or draining pus.    You have nausea or vomiting.    You feel dizzy or lightheaded.    You have questions or concerns about your condition or care.    Medicines:  You may need any of the following:  NSAIDs , such as ibuprofen, help decrease swelling, pain, and fever. NSAIDs can cause stomach bleeding or kidney problems in certain people. If you take blood thinner medicine, always ask your healthcare provider if NSAIDs are safe for you. Always read the medicine label and follow directions.    Prescription pain medicine  may be given. Ask how to take this medicine safely.    Take your medicine as directed.  Contact your healthcare provider if you think your medicine is not helping or if you have side effects. Tell your provider if you are allergic to any medicine. Keep a list of the medicines, vitamins, and herbs you take. Include the amounts, and when and why you take them. Bring the list or the  pill bottles to follow-up visits. Carry your medicine list with you in case of an emergency.    Wound care:  Care for your wound as directed. You may need to carefully wash the wound with soap and water. Dry the area and put on new, clean bandages as directed.  Self-care:   Ice  helps decrease swelling and pain. Ice may also help prevent tissue damage. Use an ice pack, or put crushed ice in a plastic bag. Cover it with a towel and place it on your wrist for 15 to 20 minutes 5 to 6 times a day or as directed.    Elevate  your wrist above the level of your heart as often as you can. This will help decrease swelling and pain. Prop your wrist on pillows or blankets to keep it elevated comfortably.    Move your thumb and hand as directed.  This helps prevent stiffness and improves function.    Go to hand therapy if directed by your healthcare provider.  Hand therapists can teach you exercises to help improve movement and strength, and to decrease pain.    Follow up with your healthcare provider as directed:  You will need to return to have your wound checked. Write down your questions so you remember to ask them during your visits.  © Copyright [employer] 2023 Information is for End User's use only and may not be sold, redistributed or otherwise used for commercial purposes.  The above information is an  only. It is not intended as medical advice for individual conditions or treatments. Talk to your doctor, nurse or pharmacist before following any medical regimen to see if it is safe and effective for you.

## 2024-02-28 ENCOUNTER — TELEPHONE (OUTPATIENT)
Dept: FAMILY MEDICINE CLINIC | Facility: CLINIC | Age: 64
End: 2024-02-28

## 2024-02-28 ENCOUNTER — OFFICE VISIT (OUTPATIENT)
Dept: FAMILY MEDICINE CLINIC | Facility: CLINIC | Age: 64
End: 2024-02-28
Payer: MEDICARE

## 2024-02-28 ENCOUNTER — APPOINTMENT (OUTPATIENT)
Dept: RADIOLOGY | Facility: CLINIC | Age: 64
End: 2024-02-28
Payer: MEDICARE

## 2024-02-28 VITALS
RESPIRATION RATE: 16 BRPM | DIASTOLIC BLOOD PRESSURE: 78 MMHG | HEIGHT: 64 IN | SYSTOLIC BLOOD PRESSURE: 122 MMHG | HEART RATE: 66 BPM | OXYGEN SATURATION: 97 % | WEIGHT: 263 LBS | BODY MASS INDEX: 44.9 KG/M2

## 2024-02-28 DIAGNOSIS — M25.431 SWELLING OF RIGHT WRIST: ICD-10-CM

## 2024-02-28 DIAGNOSIS — M25.531 RIGHT WRIST PAIN: ICD-10-CM

## 2024-02-28 DIAGNOSIS — M25.531 RIGHT WRIST PAIN: Primary | ICD-10-CM

## 2024-02-28 DIAGNOSIS — E11.9 TYPE 2 DIABETES MELLITUS WITHOUT COMPLICATION, WITHOUT LONG-TERM CURRENT USE OF INSULIN (HCC): ICD-10-CM

## 2024-02-28 PROCEDURE — 73110 X-RAY EXAM OF WRIST: CPT

## 2024-02-28 PROCEDURE — 99213 OFFICE O/P EST LOW 20 MIN: CPT | Performed by: NURSE PRACTITIONER

## 2024-02-28 NOTE — TELEPHONE ENCOUNTER
----- Message from SHAWNA Diallo sent at 2/28/2024 11:30 AM EST -----  Please let the patient know that her xray did show degenerative changes but no fracture or dislocation.

## 2024-02-28 NOTE — PROGRESS NOTES
Name: Kristy Brown      : 1960      MRN: [id number]  Encounter Provider: SHAWNA Diallo  Encounter Date: 2024   Encounter department: [facility]    Assessment & Plan     1. Right wrist pain  -     XR wrist 3+ vw right; Future; Expected date: 2024  -     Ambulatory Referral to Physical Therapy; Future  -     Ambulatory Referral to Hand Surgery; Future    Patient reports occasional right wrist pain with movement for the past few weeks.   Denies any injury.   Patient reports that she woke up on  and her right wrist and thumb were swollen.   Patient went to urgent care and was prescribed ibuprofen for pain and swelling.   Patient reports that the swelling has improved.   Patient reports that the pain is fairly constant.   Tenderness noted on palpation of the right wrist.   Right wrist is slightly swollen.   Xray ordered. Will follow-up results with the patient.   Patient referred to physical therapy and the hand specialist for further evaluation and treatment.   Patient instructed to follow-up if symptoms get worse or do not get better.     2. Swelling of right wrist  -     XR wrist 3+ vw right; Future; Expected date: 2024  -     Ambulatory Referral to Physical Therapy; Future  -     Ambulatory Referral to Hand Surgery; Future    3. Type 2 diabetes mellitus without complication, without long-term current use of insulin (HCC)  Patient takes metformin as prescribed.   Follow-up for regularly scheduled follow-up visit or sooner prn.            Subjective      Patient reports occasional right wrist pain with certain movements for the past few weeks.   Patient reports that she woke up on  and her right wrist and thumb her swollen.   Denies any injury.   Patient reports that her right wrist pain has been fairly constant since .   Patient reports that she went to Urgent Care and was prescribed ibuprofen OTC prn for pain.   Patient reports that  "the wrist pain is not going away.   Patient reports that the swelling has improved.     Patient takes metformin for type 2 DM. Denies any Has, dizziness, nausea, or vomiting.       Review of Systems   Constitutional:  Negative for chills and fever.   HENT:  Negative for congestion, ear pain and sore throat.    Respiratory:  Negative for cough, chest tightness and shortness of breath.    Cardiovascular:  Negative for chest pain.   Gastrointestinal:  Negative for abdominal pain, diarrhea and vomiting.   Musculoskeletal:         As noted in HPI.    Skin:  Negative for rash.   Neurological:  Negative for dizziness, light-headedness and headaches.       Current Outpatient Medications on File Prior to Visit   Medication Sig    ALPRAZolam (XANAX) 0.25 mg tablet Take 1 tablet (0.25 mg total) by mouth daily at bedtime as needed for anxiety    atorvastatin (LIPITOR) 20 mg tablet Take 1 tablet (20 mg total) by mouth daily    carvedilol (COREG) 12.5 mg tablet TAKE ONE TABLET BY MOUTH TWICE A DAY WITH MEALS    ibuprofen (MOTRIN) 800 mg tablet Take 1 tablet (800 mg total) by mouth every 8 (eight) hours as needed for mild pain    lisinopril-hydrochlorothiazide (PRINZIDE,ZESTORETIC) 10-12.5 MG per tablet Take 1 tablet by mouth daily    metFORMIN (GLUCOPHAGE) 500 mg tablet TAKE ONE TABLET BY MOUTH TWICE A DAY WITH MEALS    Multiple Vitamin (multivitamin) capsule Take 1 capsule by mouth daily    Accu-Chek Guide test strip USE TO CHECK BLOOD SUGAR ONCE DAILY AS DIRECTED (Patient not taking: Reported on 8/21/2023)    cetirizine (ZyrTEC) 10 mg tablet Take 10 mg by mouth daily (Patient not taking: Reported on 2/28/2024)       Objective     /78 (BP Location: Left arm, Patient Position: Sitting, Cuff Size: Large)   Pulse 66   Resp 16   Ht 5' 4\" (1.626 m)   Wt 119 kg (263 lb)   SpO2 97%   BMI 45.14 kg/m²     Physical Exam  Vitals reviewed.   Constitutional:       General: She is not in acute distress.     Appearance: She is not " ill-appearing or diaphoretic.   Cardiovascular:      Rate and Rhythm: Normal rate and regular rhythm.      Pulses: Normal pulses.      Heart sounds: Normal heart sounds.   Pulmonary:      Effort: Pulmonary effort is normal. No respiratory distress.      Breath sounds: Normal breath sounds. No wheezing.   Musculoskeletal:      Comments: Tenderness noted on palpation of the right wrist.   Right wrist is slightly swollen.    Skin:     Findings: No rash.   Neurological:      Mental Status: She is alert and oriented to person, place, and time.   Psychiatric:         Mood and Affect: Mood normal.       SHAWNA Diallo

## 2024-03-03 DIAGNOSIS — I10 ESSENTIAL HYPERTENSION: ICD-10-CM

## 2024-03-03 DIAGNOSIS — K74.69 OTHER CIRRHOSIS OF LIVER (HCC): ICD-10-CM

## 2024-03-03 DIAGNOSIS — E78.2 MIXED HYPERLIPIDEMIA: ICD-10-CM

## 2024-03-03 RX ORDER — ATORVASTATIN CALCIUM 20 MG/1
20 TABLET, FILM COATED ORAL DAILY
Qty: 90 TABLET | Refills: 1 | Status: SHIPPED | OUTPATIENT
Start: 2024-03-03

## 2024-03-03 RX ORDER — LISINOPRIL AND HYDROCHLOROTHIAZIDE 12.5; 1 MG/1; MG/1
1 TABLET ORAL DAILY
Qty: 90 TABLET | Refills: 1 | Status: SHIPPED | OUTPATIENT
Start: 2024-03-03

## 2024-03-04 RX ORDER — CARVEDILOL 12.5 MG/1
TABLET ORAL
Qty: 60 TABLET | Refills: 5 | Status: SHIPPED | OUTPATIENT
Start: 2024-03-04

## 2024-03-06 DIAGNOSIS — K74.69 OTHER CIRRHOSIS OF LIVER (HCC): Primary | ICD-10-CM

## 2024-03-12 ENCOUNTER — EVALUATION (OUTPATIENT)
Dept: PHYSICAL THERAPY | Facility: CLINIC | Age: 64
End: 2024-03-12
Payer: MEDICARE

## 2024-03-12 DIAGNOSIS — M25.531 RIGHT WRIST PAIN: ICD-10-CM

## 2024-03-12 DIAGNOSIS — M25.431 SWELLING OF RIGHT WRIST: ICD-10-CM

## 2024-03-12 PROCEDURE — 97162 PT EVAL MOD COMPLEX 30 MIN: CPT

## 2024-03-12 NOTE — PROGRESS NOTES
PT Evaluation     Today's date: 3/12/2024  Patient name: Kristy Brown  : 1960  MRN: [id number]  Referring provider: Kristy Singh, *  Dx:   Encounter Diagnosis     ICD-10-CM    1. Right wrist pain  M25.531 Ambulatory Referral to Physical Therapy      2. Swelling of right wrist  M25.431 Ambulatory Referral to Physical Therapy          Start Time: 1045  Stop Time: 1130  Total time in clinic (min): 45 minutes    Assessment  Assessment details: Kristy Brown is a pleasant 63 y.o. female who presents with R wrist and thumb pain.  The patient's greatest concerns are worry over not knowing what's wrong, concern at no signs of improvement, wanting to avoid painkillers, fear of not being able to keep active, and future ill health (and wanting to prevent it).      No further referral appears necessary at this time based upon examination results.    Primary movement impairment diagnosis of muscular tightness resulting in pathoanatomical symptoms of decreased muscular endurance and limiting her ability to care for self, carry, drive, eat, exercise or recreation, lift, perform household chores, perform yard work, and hold items.    Primary Impairments:  1) wrist/elbow muscular tightness  2) wrist muscular endurance    Etiologic factors include repetitive poor body mechanics.    Impairments: abnormal coordination, abnormal muscle firing, activity intolerance, difficulty understanding, impaired physical strength, lacks appropriate home exercise program and pain with function    Symptom irritability: moderateUnderstanding of Dx/Px/POC: good   Prognosis: good  Prognosis details: Positive prognostic indicators include positive attitude toward recovery and good understanding of diagnosis and treatment plan options.  Negative prognostic indicators include chronicity of symptoms, high symptom irritability, minimal changes in pain with movement, and multiple concurrent orthopedic problems.      Goals  STG: to  be achieved in 4-6 weeks  1)Reduce pain by to 0/10 at rest  2)Improve pain free  strength by symmetrical bilaterally  3)Improve flexibility to wrist muscles to WNL     LTG: to be achieved at discharge (6-8 weeks)  1)Improve FOTO to greater than or equal to predicted outcome  2)Improve ADL's in related activities to maximal level of function  3)Improve  strength to pain free with maximal force   4)Patient is able to return to driving activities without increased pain and discomfort.    Plan  Patient would benefit from: skilled physical therapy  Planned modality interventions: Modalities PRN.  Planned therapy interventions: activity modification, manual therapy, neuromuscular re-education, patient education, therapeutic activities, therapeutic exercise, graded activity, home exercise program, behavior modification, self care, IASTM, joint mobilization and kinesiology taping  Frequency: 2x week  Duration in weeks: 8  Treatment plan discussed with: patient      Subjective Evaluation    History of Present Illness  Mechanism of injury: WORK/SCHOOL: retired  HOME LIFE: with others  HOBBIES/EXERCISE: embroidery, gardening, walking, plays, spending time with friends, dog,   PLOF:  R wrist break greater than 20 years ago  HISTORY OF CURRENT INJURY:  Pt reports that she has R wrist and thumb pain.  She feels as though it is getting worse. She reports that it began about 3 or more months ago, beginning with achiness.  Sharp pain began about 2-3 months ago which keep increasing. Pt is R hand dominant  PAIN LOCATION/DESCRIPTORS: posterior lateral wrist pain, radiates down thumb, can include first finger   AGGRAVATING FACTORS:  opening jars, opening cans, embroidery, movement, washing hands, lifting things, holding things for periods of time, writing, driving,   EASES: ice, resting, rubbing it,   DAY PATTERN: no day pattern, just with movement  IMAGING: x-ray no significant findings,   Kristy denies a new onset of  Bladder incontinence, Bowel dysfunction, Dizziness, Dysphagia, Dysarthria, Drop attacks, Diplopia, Nausea, Ataxia, Recent unexplained weight loss, Clumsy or unsteadiness, Constant night pain, History of cancer, Tingling, Numbness, and Saddle anesthesia .  PATIENT GOALS: decrease pain, hold things in hand,     Patient Goals  Patient goals for therapy: decreased edema, decreased pain, increased motion, independence with ADLs/IADLs and increased strength    Pain  Current pain ratin  At best pain ratin  At worst pain rating: 10  Quality: dull ache, discomfort, sharp and radiating  Relieving factors: ice and rest  Aggravating factors: eating, keyboarding, overhead activity and lifting        Objective     Palpation     Additional Palpation Details  No TTP of wrist, hand, or elbow musculature though noted sensitivity to the area.      Neurological Testing     Additional Neurological Details  No noted neurological changes to note    Active Range of Motion     Left Wrist   Wrist flexion: WFL  Wrist extension: WFL  Radial deviation: WFL  Ulnar deviation: WFL      Right Wrist   Wrist flexion: WFL  Wrist extension: WFl  Radial deviation: WFL  Ulnar deviation: WFL    Additional Active Range of Motion Details  All motions WFL    Strength/Myotome Testing     Left Wrist/Hand   Wrist extension: 4  Wrist flexion: 4  Radial deviation: 3+  Ulnar deviation: 4-     (2nd hand position)     Trial 1: 20    Trial 2: 18    Trial 3: 15    Average: 17.67    Thumb Strength  Key/Lateral Pinch     Trial 1: 10    Trial 2: 9    Trial 3: 10    Average: 9.67  Tip/Two-Point Pinch     Trial 1: 11    Trial 2: 12    Trial 3: 11    Average: 11.33    Right Wrist/Hand   Wrist extension: 4  Wrist flexion: 4  Radial deviation: 4-  Ulnar deviation: 3+     (2nd hand position)     Trial 1: 1    Trial 2: 1    Trial 3: 1    Average: 1    Thumb Strength   Key/Lateral Pinch     Trial 1: 11    Trial 2: 10    Trial 3: 11    Average:  10.67  Tip/Two-Point Pinch     Trial 1: 1    Trial 2: 4    Trial 3: 5    Average: 3.33    Additional Strength Details  Painful extension and AB strength R thumb    Tests     Left Wrist/Hand   Negative Finkelstein's.     Right Wrist/Hand   Positive Finkelstein's.     Swelling     Right Wrist/Hand     Additional Swelling Details  No noted swelling today              POC Expires Auth Status Start Date Expiration Date PT Visit Limit    4/12 N/A   As needed   Date 3/12       Used 1       Remaining          Diagnosis: R wrist   Precautions:    Primary Goals:  strength, wrist endurance, flexibility   *asterisks by exercise = given for HEP   Manuals 3/12       IASTM        PROM                                There Ex        Wrist flexion stretch elbow bent        Wrist flexion stretch elbow bent        Wrist flexion stretch elbow straight        Wrist flexion stretch elbow straight                                        UBE        Neuro Re-Ed        Wrist extension        Wrist flexion        Supination        Pronation                                                                         Re-evaluation              Ther Act              Education                           Modalities

## 2024-03-13 ENCOUNTER — OFFICE VISIT (OUTPATIENT)
Dept: PHYSICAL THERAPY | Facility: CLINIC | Age: 64
End: 2024-03-13
Payer: MEDICARE

## 2024-03-13 DIAGNOSIS — M25.531 RIGHT WRIST PAIN: Primary | ICD-10-CM

## 2024-03-13 DIAGNOSIS — M25.431 SWELLING OF RIGHT WRIST: ICD-10-CM

## 2024-03-13 PROCEDURE — 97110 THERAPEUTIC EXERCISES: CPT

## 2024-03-13 PROCEDURE — 97140 MANUAL THERAPY 1/> REGIONS: CPT

## 2024-03-13 NOTE — PROGRESS NOTES
"Daily Note     Today's date: 3/13/2024  Patient name: Kristy Brown  : 1960  MRN: [id number]  Referring provider: Kristy Singh, *  Dx:   Encounter Diagnosis     ICD-10-CM    1. Right wrist pain  M25.531       2. Swelling of right wrist  M25.431           Start Time: 1445  Stop Time: 1530  Total time in clinic (min): 45 minutes    Subjective: Pt reports that she had some increased soreness after her initial evaluation, though nothing she couldn't tolerate.        Objective: See treatment diary below      Assessment: Tolerated treatment well. Treatment today began with manual interventions including IASTM with good tolerance.  Pt presented with trigger points throughout wrist extensor musculature.  Moved into stretching for wrist flexors and extensors.  Continued with mobilizations to allow decreased tightness and irritation in wrist as well as thumb areas with good tolerance.  Ended with ice with pt presenting with decreased discomfort afterward.  Patient would benefit from continued PT      Plan: Continue per plan of care.       POC Expires Auth Status Start Date Expiration Date PT Visit Limit     N/A   As needed   Date 3/12 3/13      Used 1 2      Remaining          Diagnosis: R wrist   Precautions:    Primary Goals:  strength, wrist endurance, flexibility   *asterisks by exercise = given for HEP   Manuals 3/12 3/13      IASTM  AK      PROM  AK      Distraction mobs  AK                      There Ex        Wrist flexion stretch elbow bent  10x10\"      Wrist flexion stretch elbow bent  10x10\"      Wrist flexion stretch elbow straight  10x10\"      Wrist flexion stretch elbow straight  10x10\"      Wrist flexion elbow straight with fingers  10x10\"                              UBE        Neuro Re-Ed        Wrist extension        Wrist flexion        Supination        Pronation                                                                         Re-evaluation              Ther Act         "      Education                           Modalities

## 2024-03-18 ENCOUNTER — OFFICE VISIT (OUTPATIENT)
Dept: OBGYN CLINIC | Facility: CLINIC | Age: 64
End: 2024-03-18
Payer: MEDICARE

## 2024-03-18 VITALS — HEIGHT: 64 IN | BODY MASS INDEX: 44.9 KG/M2 | WEIGHT: 263 LBS

## 2024-03-18 DIAGNOSIS — M18.11 ARTHRITIS OF CARPOMETACARPAL (CMC) JOINT OF RIGHT THUMB: ICD-10-CM

## 2024-03-18 DIAGNOSIS — M25.431 SWELLING OF RIGHT WRIST: ICD-10-CM

## 2024-03-18 PROCEDURE — 99204 OFFICE O/P NEW MOD 45 MIN: CPT | Performed by: STUDENT IN AN ORGANIZED HEALTH CARE EDUCATION/TRAINING PROGRAM

## 2024-03-18 NOTE — PROGRESS NOTES
ORTHOPAEDIC HAND, WRIST, AND ELBOW OFFICE  VISIT      ASSESSMENT/PLAN:      Diagnoses and all orders for this visit:    Arthritis of carpometacarpal (CMC) joint of right thumb  -     Ambulatory Referral to Hand Surgery  -     Ambulatory Referral to PT/OT Hand Therapy; Future  -     Diclofenac Sodium (VOLTAREN) 1 %; Apply 2 g topically 4 (four) times a day  -     Thumb Cude comf/Cool    Swelling of right wrist  -     Ambulatory Referral to Hand Surgery          63 y.o. female with right thumb CMC OA.  Xrays reviewed.  Treatment options and expected outcomes were discussed.  The patient verbalized understanding of exam findings and treatment plan.   The patient was given the opportunity to ask questions.  Questions were answered to the patient's satisfaction.  The patient decided to move forward with comfort cool brace, therapy and Voltaren Gel.      Follow Up:  PRN       To Do Next Visit:  Re-evaluation of current issue      Discussions:  Thumb CMC Arthritis: The anatomy and physiology of carpometacarpal joint arthritis was discussed with the patient today in the office.  Deterioration of the articular cartilage eventually leads to hypermobility at the thumb CMC joint, resulting in joint subluxation, osteophyte formation, cystic changes within the trapezium and base of the first metacarpal, as well as subchondral sclerosis.  Eventually, pain, limited mobility, and compensatory hyperextension at the metacarpophalangeal joint may develop.  While normal activity and usage of the thumb joint may provide a painful experience to the patient, this typically does not result in damage to the thumb or hand.  Treatment options include resting thumb spica splints to decreased joint edema, pain, and inflammation.  Therapy exercises to strengthen the thenar musculature may relieve pain, but do not alter the overall continued development of osteoarthritis.  Oral medications, topical medications, corticosteroid injections may  decrease pain and increase overall function.  Eventually, approximately 5% of patients may require surgical intervention.       Venancio Townsend MD  Attending, Orthopaedic Surgery  Hand, Wrist, and Elbow Surgery  [facility]    ______________________________________________________________________________________________    CHIEF COMPLAINT:  Chief Complaint   Patient presents with   • Right Wrist - Pain       SUBJECTIVE:  Patient is a 63 y.o. RHD female who presents today for evaluation and treatment of right thumb pain. This has been gradually coming on the past couple of months. Describes pain along the base of the thumb, worse with gripping.   Referred for evaluation by SHAWNA Diallo.  Occupation: Retired     I have personally reviewed all the relevant PMH, PSH, SH, FH, Medications and allergies      PAST MEDICAL HISTORY:  Past Medical History:   Diagnosis Date   • Cirrhosis (HCC)    • Diabetes mellitus (HCC)    • Ear problems    • GERD (gastroesophageal reflux disease)    • HL (hearing loss)    • Hypertension    • Seasonal allergies    • Thrombocytopenia, unspecified (HCC)     Per CMS ICD 10 Guidelines--Per Phyisician   • Tinnitus        PAST SURGICAL HISTORY:  Past Surgical History:   Procedure Laterality Date   • ACHILLES TENDON REPAIR Right    • COLONOSCOPY     • HYSTERECTOMY      age 40   • UPPER GASTROINTESTINAL ENDOSCOPY         FAMILY HISTORY:  Family History   Problem Relation Age of Onset   • Lung cancer Sister    • Cancer Sister    • Hypertension Mother    • No Known Problems Father    • No Known Problems Maternal Grandmother    • No Known Problems Maternal Grandfather    • No Known Problems Paternal Grandmother    • No Known Problems Paternal Grandfather    • No Known Problems Brother    • No Known Problems Brother        SOCIAL HISTORY:  Social History     Tobacco Use   • Smoking status: Every Day     Current packs/day: 0.25     Average packs/day: 0.3 packs/day for  40.0 years (10.0 ttl pk-yrs)     Types: Cigarettes   • Smokeless tobacco: Never   Vaping Use   • Vaping status: Never Used   Substance Use Topics   • Alcohol use: No     Comment: Sober for 20 years   • Drug use: No       MEDICATIONS:    Current Outpatient Medications:   •  ALPRAZolam (XANAX) 0.25 mg tablet, Take 1 tablet (0.25 mg total) by mouth daily at bedtime as needed for anxiety, Disp: 30 tablet, Rfl: 0  •  atorvastatin (LIPITOR) 20 mg tablet, TAKE ONE TABLET BY MOUTH EVERY DAY, Disp: 90 tablet, Rfl: 1  •  carvedilol (COREG) 12.5 mg tablet, TAKE ONE TABLET BY MOUTH TWICE A DAY WITH MEALS, Disp: 60 tablet, Rfl: 5  •  Diclofenac Sodium (VOLTAREN) 1 %, Apply 2 g topically 4 (four) times a day, Disp: 100 g, Rfl: 2  •  ibuprofen (MOTRIN) 800 mg tablet, Take 1 tablet (800 mg total) by mouth every 8 (eight) hours as needed for mild pain, Disp: 30 tablet, Rfl: 0  •  lisinopril-hydrochlorothiazide (PRINZIDE,ZESTORETIC) 10-12.5 MG per tablet, TAKE ONE TABLET BY MOUTH EVERY DAY, Disp: 90 tablet, Rfl: 1  •  metFORMIN (GLUCOPHAGE) 500 mg tablet, TAKE ONE TABLET BY MOUTH TWICE A DAY WITH MEALS, Disp: 180 tablet, Rfl: 1  •  Multiple Vitamin (multivitamin) capsule, Take 1 capsule by mouth daily, Disp: , Rfl:   •  Accu-Chek Guide test strip, USE TO CHECK BLOOD SUGAR ONCE DAILY AS DIRECTED (Patient not taking: Reported on 8/21/2023), Disp: 100 strip, Rfl: 1  •  cetirizine (ZyrTEC) 10 mg tablet, Take 10 mg by mouth daily (Patient not taking: Reported on 2/28/2024), Disp: , Rfl:     ALLERGIES:  Allergies   Allergen Reactions   • Penicillins            REVIEW OF SYSTEMS:  Musculoskeletal:        As noted in HPI.   All other systems reviewed and are negative.    VITALS:  There were no vitals filed for this visit.    LABS:  HgA1c:   Lab Results   Component Value Date    HGBA1C 7.3 (H) 01/10/2024     BMP:   Lab Results   Component Value Date    GLUCOSE 102 05/08/2014    CALCIUM 9.5 01/10/2024     05/08/2014    K 3.8 01/10/2024     CO2 26 01/10/2024     01/10/2024    BUN 9 01/10/2024    CREATININE 0.65 01/10/2024       _____________________________________________________  PHYSICAL EXAMINATION:  General: Well developed and well nourished, alert & oriented x 3, appears comfortable  Psychiatric: Normal  HEENT: Normocephalic, Atraumatic Trachea Midline, No torticollis  Pulmonary: No audible wheezing or respiratory distress   Abdomen/GI: Non tender, non distended   Cardiovascular: No pitting edema, 2+ radial pulse   Skin: No masses, erythema, lacerations, fluctation, ulcerations  Neurovascular: Sensation Intact to the Median, Ulnar, Radial Nerve, Motor Intact to the Median, Ulnar, Radial Nerve, and Pulses Intact  Musculoskeletal: Normal, except as noted in detailed exam and in HPI.      MUSCULOSKELETAL EXAMINATION:  Right Wrist:  No obvious edema today.  Nontender 1st dorsal compartment.  + CMC ttp,  + CMC circumduction.  No concerning LROM of the wrist flexion/extension.  Compartments soft.  Brisk capillary refill.     ___________________________________________________  STUDIES REVIEWED:  Xrays of the right wrist were reviewed and independently interpreted in PACS by Dr. Townsend and demonstrate significant degenerative changes about the thumb CMC joint.           PROCEDURES PERFORMED:  Procedures  No Procedures performed today    _____________________________________________________      Scribe Attestation    I,:  Fay Oglesby PA-C am acting as a scribe while in the presence of the attending physician.:       I,:  Venancio Townsend MD personally performed the services described in this documentation    as scribed in my presence.:

## 2024-03-20 ENCOUNTER — OFFICE VISIT (OUTPATIENT)
Dept: PHYSICAL THERAPY | Facility: CLINIC | Age: 64
End: 2024-03-20
Payer: MEDICARE

## 2024-03-20 DIAGNOSIS — M25.531 RIGHT WRIST PAIN: Primary | ICD-10-CM

## 2024-03-20 DIAGNOSIS — M25.431 SWELLING OF RIGHT WRIST: ICD-10-CM

## 2024-03-20 PROCEDURE — 97110 THERAPEUTIC EXERCISES: CPT

## 2024-03-20 PROCEDURE — 97112 NEUROMUSCULAR REEDUCATION: CPT

## 2024-03-20 PROCEDURE — 97140 MANUAL THERAPY 1/> REGIONS: CPT

## 2024-03-20 NOTE — PROGRESS NOTES
"Daily Note     Today's date: 3/20/2024  Patient name: Kristy Brown  : 1960  MRN: [id number]  Referring provider: Kristy Singh, *  Dx:   Encounter Diagnosis     ICD-10-CM    1. Right wrist pain  M25.531       2. Swelling of right wrist  M25.431           Start Time: 1445  Stop Time: 1530  Total time in clinic (min): 45 minutes    Subjective: Pt reports that she had some muscle soreness after last treatment session.       Objective: See treatment diary below      Assessment: Tolerated treatment well. Treatment today began with manual interventions including IASTM to decrease tightness as well as TPR to individual musculature.  Pt presented with tightness through wrist extensor musculature.  Added finger extension to dissociate between muscle activation during extension activities. Pt presented with muscular fatigue by end of treatment session today.  Patient would benefit from continued PT      Plan: Continue per plan of care.       POC Expires Auth Status Start Date Expiration Date PT Visit Limit     N/A   As needed   Date 3/12 3/13 3/20     Used 1 2 3     Remaining          Diagnosis: R wrist   Precautions:    Primary Goals:  strength, wrist endurance, flexibility   *asterisks by exercise = given for HEP   Manuals 3/12 3/13 3/20     IASTM  AK AK     PROM  AK      Distraction mobs  AK                      There Ex        Wrist flexion stretch elbow bent  10x10\" 10x10\"     Wrist flexion stretch elbow bent  10x10\" 10x10\"     Wrist flexion stretch elbow straight  10x10\" 10x10\"     Wrist flexion stretch elbow straight  10x10\" 10x10\"     Wrist flexion elbow straight with fingers  10x10\" 10x10\"                             UBE        Neuro Re-Ed        Wrist extension        Wrist flexion        Supination        Pronation        Finger extension   2x10     Thumb AB/AD   2x10     Thumb flex/ext   2x10                                              Re-evaluation              Ther Act              " Education                           Modalities

## 2024-03-22 ENCOUNTER — OFFICE VISIT (OUTPATIENT)
Dept: PHYSICAL THERAPY | Facility: CLINIC | Age: 64
End: 2024-03-22
Payer: MEDICARE

## 2024-03-22 DIAGNOSIS — M25.531 RIGHT WRIST PAIN: Primary | ICD-10-CM

## 2024-03-22 DIAGNOSIS — M25.431 SWELLING OF RIGHT WRIST: ICD-10-CM

## 2024-03-22 PROCEDURE — 97140 MANUAL THERAPY 1/> REGIONS: CPT

## 2024-03-22 PROCEDURE — 97110 THERAPEUTIC EXERCISES: CPT

## 2024-03-22 PROCEDURE — 97112 NEUROMUSCULAR REEDUCATION: CPT

## 2024-03-22 NOTE — PROGRESS NOTES
"Daily Note     Today's date: 3/22/2024  Patient name: Kristy Brown  : 1960  MRN: [id number]  Referring provider: Kristy Singh, *  Dx:   Encounter Diagnosis     ICD-10-CM    1. Right wrist pain  M25.531       2. Swelling of right wrist  M25.431           Start Time: 1345  Stop Time: 1430  Total time in clinic (min): 45 minutes    Subjective: Pt reports that she had an exacerbation of pain when she was putting on her bag today.       Objective: See treatment diary below      Assessment: Tolerated treatment well. Treatment today began with manual interventions to address tightness within wrist extensor musculature.  Pt tolerated manual interventions well today.  Added stretching as well as motor control exercises with good tolerance. Trialed taping to assist thumb extension activities.    Patient would benefit from continued PT      Plan: Continue per plan of care.       POC Expires Auth Status Start Date Expiration Date PT Visit Limit     N/A   As needed   Date 3/12 3/13 3/20 3/22    Used 1 2 3 4    Remaining          Diagnosis: R wrist   Precautions:    Primary Goals:  strength, wrist endurance, flexibility   *asterisks by exercise = given for HEP   Manuals 3/12 3/13 3/20 3/22    IASTM  AK AK AK    PROM  AK      Distraction mobs  AK      Taping    AK thumb abduction            There Ex        Wrist flexion stretch elbow bent  10x10\" 10x10\" 10x10\"    Wrist flexion stretch elbow bent  10x10\" 10x10\" 10x10\"    Wrist flexion stretch elbow straight  10x10\" 10x10\" 10x10\"    Wrist flexion stretch elbow straight  10x10\" 10x10\" 10x10\"    Wrist flexion elbow straight with fingers  10x10\" 10x10\" 10x10\"                            UBE        Neuro Re-Ed        Wrist extension    1x10    Wrist flexion        Supination    20x5\" hammer    Pronation    20x5\" hammer    Finger extension   2x10 2x10    Thumb AB/AD   2x10     Thumb flex/ext   2x10 2x10                                             " Re-evaluation              Ther Act              Education                           Modalities

## 2024-03-27 ENCOUNTER — OFFICE VISIT (OUTPATIENT)
Dept: PHYSICAL THERAPY | Facility: CLINIC | Age: 64
End: 2024-03-27
Payer: MEDICARE

## 2024-03-27 DIAGNOSIS — M25.431 SWELLING OF RIGHT WRIST: ICD-10-CM

## 2024-03-27 DIAGNOSIS — M25.531 RIGHT WRIST PAIN: Primary | ICD-10-CM

## 2024-03-27 PROCEDURE — 97112 NEUROMUSCULAR REEDUCATION: CPT

## 2024-03-27 PROCEDURE — 97110 THERAPEUTIC EXERCISES: CPT

## 2024-03-27 PROCEDURE — 97140 MANUAL THERAPY 1/> REGIONS: CPT

## 2024-03-27 NOTE — PROGRESS NOTES
"Daily Note     Today's date: 3/27/2024  Patient name: Kristy Brown  : 1960  MRN: [id number]  Referring provider: Kristy Singh, *  Dx:   Encounter Diagnosis     ICD-10-CM    1. Right wrist pain  M25.531       2. Swelling of right wrist  M25.431           Start Time: 1400  Stop Time: 1445  Total time in clinic (min): 45 minutes    Subjective: Pt reports that she felt the tape had helped her for the next few days after her treatment session.       Objective: See treatment diary below      Assessment: Tolerated treatment well. Treatment today began with epat with good tolerance.  Pt was able to tolerate 2 rounds today.  Continued with her stretching activities with no pain noted. Pt was able to perform her exercises well.  She had some pain during thumb AB though decreased pain noted after adding overpressure.  Finished with taping into AB with good tolerance.   Patient would benefit from continued PT      Plan: Continue per plan of care.       POC Expires Auth Status Start Date Expiration Date PT Visit Limit     N/A   As needed   Date 3/12 3/13 3/20 3/22 3/27   Used 1 2 3 4 5   Remaining          Diagnosis: R wrist   Precautions:    Primary Goals:  strength, wrist endurance, flexibility   *asterisks by exercise = given for HEP   Manuals 3/12 3/13 3/20 3/22    IASTM  AK AK AK    PROM  AK      Distraction mobs  AK   AK   Taping    AK thumb abduction AK thumb abduction           There Ex        Wrist flexion stretch elbow bent  10x10\" 10x10\" 10x10\"    Wrist flexion stretch elbow bent  10x10\" 10x10\" 10x10\"    Wrist flexion stretch elbow straight  10x10\" 10x10\" 10x10\"    Wrist flexion stretch elbow straight  10x10\" 10x10\" 10x10\"    Wrist flexion elbow straight with fingers  10x10\" 10x10\" 10x10\"                            UBE        Neuro Re-Ed        Wrist extension    1x10 1x10   Wrist flexion        Supination    20x5\" hammer 20x5\" hammer   Pronation    20x5\" hammer 20x5\" hammer   Finger " extension   2x10 2x10 2x10   Thumb AB/AD   2x10  2x10 c overpressure   Thumb flex/ext   2x10 2x10 2x10                                            Re-evaluation              Ther Act              Education          AK                 Modalities

## 2024-03-29 ENCOUNTER — OFFICE VISIT (OUTPATIENT)
Dept: PHYSICAL THERAPY | Facility: CLINIC | Age: 64
End: 2024-03-29
Payer: MEDICARE

## 2024-03-29 DIAGNOSIS — M25.531 RIGHT WRIST PAIN: Primary | ICD-10-CM

## 2024-03-29 DIAGNOSIS — M25.431 SWELLING OF RIGHT WRIST: ICD-10-CM

## 2024-03-29 PROCEDURE — 97140 MANUAL THERAPY 1/> REGIONS: CPT

## 2024-03-29 PROCEDURE — 97110 THERAPEUTIC EXERCISES: CPT

## 2024-03-29 PROCEDURE — 97112 NEUROMUSCULAR REEDUCATION: CPT

## 2024-03-29 NOTE — PROGRESS NOTES
"Daily Note     Today's date: 3/29/2024  Patient name: Kristy Brown  : 1960  MRN: [id number]  Referring provider: Kristy Singh, *  Dx:   Encounter Diagnosis     ICD-10-CM    1. Right wrist pain  M25.531       2. Swelling of right wrist  M25.431           Start Time: 1345  Stop Time: 1430  Total time in clinic (min): 45 minutes    Subjective: Pt reports that her general soreness has decreased, reporting less frequency.        Objective: See treatment diary below      Assessment: Tolerated treatment well. Began today with stretching to decrease tightness noted up arrival to treatment today.  Added IASTM as well as distraction to decrease tightness and discomfort noted.  Continued strengthening as well as AROM activities to pt's tolerance.  She had intermittent occurrence of pain with gripping today though decreased with rest breaks.  Patient would benefit from continued PT      Plan: Continue per plan of care.       POC Expires Auth Status Start Date Expiration Date PT Visit Limit     N/A   As needed   Date 3/29 3/13 3/20 3/22 3/27   Used 6 2 3 4 5   Remaining          Diagnosis: R wrist   Precautions:    Primary Goals:  strength, wrist endurance, flexibility   *asterisks by exercise = given for HEP   Manuals 3/29 3/13 3/20 3/22 3/27   IASTM AK AK AK AK    PROM  AK      Distraction mobs AK AK   AK   Taping AK   AK thumb abduction AK thumb abduction           There Ex        Wrist flexion stretch elbow bent 10x10\" 10x10\" 10x10\" 10x10\"    Wrist flexion stretch elbow bent 10x10\" 10x10\" 10x10\" 10x10\"    Wrist flexion stretch elbow straight 10x10\" 10x10\" 10x10\" 10x10\"    Wrist flexion stretch elbow straight 10x10\" 10x10\" 10x10\" 10x10\"    Wrist flexion elbow straight with fingers 10x10\" 10x10\" 10x10\" 10x10\"                            UBE        Neuro Re-Ed        Wrist extension 2x10   1x10 1x10   Wrist flexion        Supination    20x5\" hammer 20x5\" hammer   Pronation    20x5\" hammer 20x5\" " hammer   Finger extension 2x10  2x10 2x10 2x10   Thumb AB/AD 2x10 on table  2x10  2x10 c overpressure   Thumb flex/ext   2x10 2x10 2x10    2s10 red                                        Re-evaluation             Ther Act             Education         AK                Modalities

## 2024-04-03 ENCOUNTER — OFFICE VISIT (OUTPATIENT)
Dept: PHYSICAL THERAPY | Facility: CLINIC | Age: 64
End: 2024-04-03
Payer: MEDICARE

## 2024-04-03 DIAGNOSIS — M25.531 RIGHT WRIST PAIN: Primary | ICD-10-CM

## 2024-04-03 DIAGNOSIS — M25.431 SWELLING OF RIGHT WRIST: ICD-10-CM

## 2024-04-03 PROCEDURE — 97140 MANUAL THERAPY 1/> REGIONS: CPT

## 2024-04-03 PROCEDURE — 97112 NEUROMUSCULAR REEDUCATION: CPT

## 2024-04-03 PROCEDURE — 97110 THERAPEUTIC EXERCISES: CPT

## 2024-04-03 NOTE — PROGRESS NOTES
"Daily Note     Today's date: 4/3/2024  Patient name: Kristy Brown  : 1960  MRN: [id number]  Referring provider: Kristy Singh, *  Dx:   Encounter Diagnosis     ICD-10-CM    1. Right wrist pain  M25.531       2. Swelling of right wrist  M25.431           Start Time: 1400  Stop Time: 1445  Total time in clinic (min): 45 minutes    Subjective: Pt reports that her wrist is hurting less than it was last treatment session.       Objective: See treatment diary below      Assessment: Tolerated treatment well. Treatment today began with epat with good tolerance.  Pt was able to tolerate more distal treatment as well with some discomfort noted.  Followed up with stretching for wrist musculature. Added strengthening to pt tolerance with pt able to performs some thumb adduction without intense pain today.   Patient would benefit from continued PT      Plan: Continue per plan of care.       POC Expires Auth Status Start Date Expiration Date PT Visit Limit     N/A   As needed   Date 3/29 4/3 3/20 3/22 3/27   Used 6 7 3 4 5   Remaining          Diagnosis: R wrist   Precautions:    Primary Goals:  strength, wrist endurance, flexibility   *asterisks by exercise = given for HEP   Manuals 3/29 4/3 3/20 3/22 3/27   IASTM AK  AK AK    PROM        Distraction mobs AK    AK   Taping AK AK  AK thumb abduction AK thumb abduction           There Ex        Wrist flexion stretch elbow bent 10x10\" 10x10\" 10x10\" 10x10\"    Wrist flexion stretch elbow bent 10x10\" 10x10\" 10x10\" 10x10\"    Wrist flexion stretch elbow straight 10x10\" 10x10\" 10x10\" 10x10\"    Wrist flexion stretch elbow straight 10x10\" 10x10\" 10x10\" 10x10\"    Wrist flexion elbow straight with fingers 10x10\" 10x10\" 10x10\" 10x10\"                            UBE        Neuro Re-Ed        Wrist extension 2x10 2x10 1#  1x10 1x10   Wrist flexion  1x10 red flexbar      Supination  20x5\" hammer  20x5\" hammer 20x5\" hammer   Pronation  20x5\" hammer  20x5\" hammer 20x5\" " hammer   Finger extension 2x10 2x10 2x10 2x10 2x10   Thumb AB/AD 2x10 on table  2x10  2x10 c overpressure   Thumb flex/ext   2x10 2x10 2x10    2s10 red       Thumb adduction  1x10                               Re-evaluation            Ther Act            Education        AK               Modalities

## 2024-04-05 ENCOUNTER — OFFICE VISIT (OUTPATIENT)
Dept: PHYSICAL THERAPY | Facility: CLINIC | Age: 64
End: 2024-04-05
Payer: MEDICARE

## 2024-04-05 DIAGNOSIS — M25.531 RIGHT WRIST PAIN: Primary | ICD-10-CM

## 2024-04-05 DIAGNOSIS — M25.431 SWELLING OF RIGHT WRIST: ICD-10-CM

## 2024-04-05 PROCEDURE — 97110 THERAPEUTIC EXERCISES: CPT

## 2024-04-05 PROCEDURE — 97112 NEUROMUSCULAR REEDUCATION: CPT

## 2024-04-05 PROCEDURE — 97140 MANUAL THERAPY 1/> REGIONS: CPT

## 2024-04-05 NOTE — PROGRESS NOTES
"Daily Note     Today's date: 2024  Patient name: Kristy Brown  : 1960  MRN: [id number]  Referring provider: Kristy Singh, *  Dx:   Encounter Diagnosis     ICD-10-CM    1. Right wrist pain  M25.531       2. Swelling of right wrist  M25.431           Start Time: 1330  Stop Time: 1410  Total time in clinic (min): 40 minutes    Subjective: Pt reports that even with the rain she hasn't been having a ton of soreness lingering, just the sharp pain.       Objective: See treatment diary below      Assessment: Tolerated treatment well. Today began with IASTM to wrist extensor musculature.  Pt has decreased tightness noted upon palpation.  Continued to stretching for wrist musculature including fingers as well with good tolerance. Added in AROM activities with good tolerance.  Trialed strengthening for thumb extension though pt presented with pain.  Patient would benefit from continued PT      Plan: Continue per plan of care.       POC Expires Auth Status Start Date Expiration Date PT Visit Limit     N/A   As needed   Date 3/29 4/3 4/5 3/22 3/27   Used 6 7 8 4 5   Remaining          Diagnosis: R wrist   Precautions:    Primary Goals:  strength, wrist endurance, flexibility   *asterisks by exercise = given for HEP   Manuals 3/29 4/3 4/5 3/22 3/27   IASTM AK   AK    PROM        Distraction mobs AK    AK   Taping AK AK AK AK thumb abduction AK thumb abduction           There Ex        Wrist flexion stretch elbow bent 10x10\" 10x10\" 10x10\" 10x10\"    Wrist flexion stretch elbow bent 10x10\" 10x10\" 10x10\" 10x10\"    Wrist flexion stretch elbow straight 10x10\" 10x10\" 10x10\" 10x10\"    Wrist flexion stretch elbow straight 10x10\" 10x10\" 10x10\" 10x10\"    Wrist flexion elbow straight with fingers 10x10\" 10x10\" 10x10\" 10x10\"                            UBE        Neuro Re-Ed        Wrist extension 2x10 2x10 1#  1x10 1x10   Wrist flexion  1x10 red flexbar      Supination  20x5\" hammer painful 20x5\" hammer " "20x5\" hammer   Pronation  20x5\" hammer painful 20x5\" hammer 20x5\" hammer   Finger extension 2x10 2x10  2x10 2x10   Thumb AB/AD 2x10 on table  2x10 on table  2x10 c overpressure   Thumb flex/ext   2x10 on table 2x10 2x10    2s10 red       Thumb adduction  1x10                               Re-evaluation           Ther Act           Education       AK              Modalities                                                                                             "

## 2024-04-10 ENCOUNTER — OFFICE VISIT (OUTPATIENT)
Dept: PHYSICAL THERAPY | Facility: CLINIC | Age: 64
End: 2024-04-10
Payer: MEDICARE

## 2024-04-10 DIAGNOSIS — M25.431 SWELLING OF RIGHT WRIST: ICD-10-CM

## 2024-04-10 DIAGNOSIS — M25.531 RIGHT WRIST PAIN: Primary | ICD-10-CM

## 2024-04-10 PROCEDURE — 97112 NEUROMUSCULAR REEDUCATION: CPT

## 2024-04-10 PROCEDURE — 97140 MANUAL THERAPY 1/> REGIONS: CPT

## 2024-04-10 PROCEDURE — 97110 THERAPEUTIC EXERCISES: CPT

## 2024-04-10 NOTE — PROGRESS NOTES
"Daily Note     Today's date: 4/10/2024  Patient name: Kristy Brown  : 1960  MRN: [id number]  Referring provider: Kristy Singh, *  Dx:   Encounter Diagnosis     ICD-10-CM    1. Right wrist pain  M25.531       2. Swelling of right wrist  M25.431                      Subjective: Patient reports 5/10 pain.       Objective: See treatment diary below      Assessment: Tolerated treatment fair. Cont with EPAT this visit. Attempted to add resistance to finger abd, was able to perform 5 reps with small ROM with minimal discomfort, it did get progressively more uncomfortable but no sharp pain, held at 5 reps and performed last 5 with no band and better tolerance. Able to perform pronation supination actively but held at one set due to discomfort. She cont to feel relief and support with KT tape. Patient demonstrated fatigue post treatment and would benefit from continued PT      Plan: Progress treatment as tolerated.        POC Expires Auth Status Start Date Expiration Date PT Visit Limit     N/A   As needed   Date 3/29 4/3 4/5 4/10    Used 6 7 8 9    Remaining          Diagnosis: R wrist   Precautions:    Primary Goals:  strength, wrist endurance, flexibility   *asterisks by exercise = given for HEP   Manuals 3/29 4/3 4/5 4/10    IASTM AK       PROM        Distraction mobs AK       Taping AK AK AK ALEJANDRO             There Ex        Wrist flexion stretch elbow bent 10x10\" 10x10\" 10x10\" 10\"x10    Wrist flexion stretch elbow bent 10x10\" 10x10\" 10x10\" 10\"x10    Wrist flexion stretch elbow straight 10x10\" 10x10\" 10x10\" 10\"x10    Wrist flexion stretch elbow straight 10x10\" 10x10\" 10x10\" 10\"x10    Wrist flexion elbow straight with fingers 10x10\" 10x10\" 10x10\" 10\"x10                            UBE        Neuro Re-Ed        Wrist extension 2x10 2x10 1#      Wrist flexion  1x10 red flexbar      Supination  20x5\" hammer painful 10x 5\" active    Pronation  20x5\" hammer painful 10x 5\" active     Finger extension " 2x10 2x10      Thumb AB/AD 2x10 on table  2x10 on table 15x no band  5x rubber band   On table    Thumb flex/ext   2x10 on table 2x10 on table      2s10 red       Thumb adduction  1x10                               Re-evaluation         Ther Act         Education                 Modalities        EPAT  Wrist extensors     2 rounds bar 1.5  D20-S 1st  D20-T 2nd

## 2024-04-11 ENCOUNTER — RA CDI HCC (OUTPATIENT)
Dept: OTHER | Facility: HOSPITAL | Age: 64
End: 2024-04-11

## 2024-04-12 ENCOUNTER — APPOINTMENT (OUTPATIENT)
Dept: LAB | Facility: CLINIC | Age: 64
End: 2024-04-12
Payer: MEDICARE

## 2024-04-12 DIAGNOSIS — E78.2 MIXED HYPERLIPIDEMIA: ICD-10-CM

## 2024-04-12 DIAGNOSIS — E11.65 TYPE 2 DIABETES MELLITUS WITH HYPERGLYCEMIA, WITHOUT LONG-TERM CURRENT USE OF INSULIN (HCC): ICD-10-CM

## 2024-04-12 DIAGNOSIS — E66.01 MORBID OBESITY (HCC): ICD-10-CM

## 2024-04-12 LAB
ALBUMIN SERPL BCP-MCNC: 4 G/DL (ref 3.5–5)
ALP SERPL-CCNC: 87 U/L (ref 34–104)
ALT SERPL W P-5'-P-CCNC: 36 U/L (ref 7–52)
ANION GAP SERPL CALCULATED.3IONS-SCNC: 11 MMOL/L (ref 4–13)
AST SERPL W P-5'-P-CCNC: 34 U/L (ref 13–39)
BASOPHILS # BLD AUTO: 0.04 THOUSANDS/ÂΜL (ref 0–0.1)
BASOPHILS NFR BLD AUTO: 1 % (ref 0–1)
BILIRUB SERPL-MCNC: 0.67 MG/DL (ref 0.2–1)
BUN SERPL-MCNC: 10 MG/DL (ref 5–25)
CALCIUM SERPL-MCNC: 9.1 MG/DL (ref 8.4–10.2)
CHLORIDE SERPL-SCNC: 102 MMOL/L (ref 96–108)
CHOLEST SERPL-MCNC: 141 MG/DL
CO2 SERPL-SCNC: 25 MMOL/L (ref 21–32)
CREAT SERPL-MCNC: 0.59 MG/DL (ref 0.6–1.3)
EOSINOPHIL # BLD AUTO: 0.14 THOUSAND/ÂΜL (ref 0–0.61)
EOSINOPHIL NFR BLD AUTO: 3 % (ref 0–6)
ERYTHROCYTE [DISTWIDTH] IN BLOOD BY AUTOMATED COUNT: 12.6 % (ref 11.6–15.1)
EST. AVERAGE GLUCOSE BLD GHB EST-MCNC: 166 MG/DL
GFR SERPL CREATININE-BSD FRML MDRD: 96 ML/MIN/1.73SQ M
GLUCOSE P FAST SERPL-MCNC: 171 MG/DL (ref 65–99)
HBA1C MFR BLD: 7.4 %
HCT VFR BLD AUTO: 45.7 % (ref 34.8–46.1)
HDLC SERPL-MCNC: 37 MG/DL
HGB BLD-MCNC: 15 G/DL (ref 11.5–15.4)
IMM GRANULOCYTES # BLD AUTO: 0.02 THOUSAND/UL (ref 0–0.2)
IMM GRANULOCYTES NFR BLD AUTO: 0 % (ref 0–2)
LDLC SERPL CALC-MCNC: 80 MG/DL (ref 0–100)
LYMPHOCYTES # BLD AUTO: 1.51 THOUSANDS/ÂΜL (ref 0.6–4.47)
LYMPHOCYTES NFR BLD AUTO: 29 % (ref 14–44)
MCH RBC QN AUTO: 31.4 PG (ref 26.8–34.3)
MCHC RBC AUTO-ENTMCNC: 32.8 G/DL (ref 31.4–37.4)
MCV RBC AUTO: 96 FL (ref 82–98)
MONOCYTES # BLD AUTO: 0.31 THOUSAND/ÂΜL (ref 0.17–1.22)
MONOCYTES NFR BLD AUTO: 6 % (ref 4–12)
NEUTROPHILS # BLD AUTO: 3.2 THOUSANDS/ÂΜL (ref 1.85–7.62)
NEUTS SEG NFR BLD AUTO: 61 % (ref 43–75)
NRBC BLD AUTO-RTO: 0 /100 WBCS
PLATELET # BLD AUTO: 172 THOUSANDS/UL (ref 149–390)
PMV BLD AUTO: 10.8 FL (ref 8.9–12.7)
POTASSIUM SERPL-SCNC: 4.2 MMOL/L (ref 3.5–5.3)
PROT SERPL-MCNC: 6.6 G/DL (ref 6.4–8.4)
RBC # BLD AUTO: 4.78 MILLION/UL (ref 3.81–5.12)
SODIUM SERPL-SCNC: 138 MMOL/L (ref 135–147)
TRIGL SERPL-MCNC: 118 MG/DL
WBC # BLD AUTO: 5.22 THOUSAND/UL (ref 4.31–10.16)

## 2024-04-12 PROCEDURE — 80053 COMPREHEN METABOLIC PANEL: CPT

## 2024-04-12 PROCEDURE — 36415 COLL VENOUS BLD VENIPUNCTURE: CPT

## 2024-04-12 PROCEDURE — 83036 HEMOGLOBIN GLYCOSYLATED A1C: CPT

## 2024-04-12 PROCEDURE — 80061 LIPID PANEL: CPT

## 2024-04-12 PROCEDURE — 85025 COMPLETE CBC W/AUTO DIFF WBC: CPT

## 2024-04-17 ENCOUNTER — OFFICE VISIT (OUTPATIENT)
Dept: FAMILY MEDICINE CLINIC | Facility: CLINIC | Age: 64
End: 2024-04-17
Payer: MEDICARE

## 2024-04-17 ENCOUNTER — OFFICE VISIT (OUTPATIENT)
Dept: PHYSICAL THERAPY | Facility: CLINIC | Age: 64
End: 2024-04-17
Payer: MEDICARE

## 2024-04-17 VITALS
WEIGHT: 264 LBS | HEART RATE: 67 BPM | DIASTOLIC BLOOD PRESSURE: 70 MMHG | OXYGEN SATURATION: 98 % | HEIGHT: 64 IN | SYSTOLIC BLOOD PRESSURE: 120 MMHG | RESPIRATION RATE: 16 BRPM | BODY MASS INDEX: 45.07 KG/M2

## 2024-04-17 DIAGNOSIS — M25.431 SWELLING OF RIGHT WRIST: ICD-10-CM

## 2024-04-17 DIAGNOSIS — K74.69 OTHER CIRRHOSIS OF LIVER (HCC): ICD-10-CM

## 2024-04-17 DIAGNOSIS — E66.9 TYPE 2 DIABETES MELLITUS WITH OBESITY  (HCC): Primary | ICD-10-CM

## 2024-04-17 DIAGNOSIS — M25.531 RIGHT WRIST PAIN: Primary | ICD-10-CM

## 2024-04-17 DIAGNOSIS — I10 ESSENTIAL HYPERTENSION: ICD-10-CM

## 2024-04-17 DIAGNOSIS — F41.9 ANXIETY: ICD-10-CM

## 2024-04-17 DIAGNOSIS — E11.69 TYPE 2 DIABETES MELLITUS WITH OBESITY  (HCC): Primary | ICD-10-CM

## 2024-04-17 PROCEDURE — G2211 COMPLEX E/M VISIT ADD ON: HCPCS | Performed by: NURSE PRACTITIONER

## 2024-04-17 PROCEDURE — 99214 OFFICE O/P EST MOD 30 MIN: CPT | Performed by: NURSE PRACTITIONER

## 2024-04-17 PROCEDURE — 97112 NEUROMUSCULAR REEDUCATION: CPT

## 2024-04-17 PROCEDURE — 97110 THERAPEUTIC EXERCISES: CPT

## 2024-04-17 NOTE — PROGRESS NOTES
Name: Kristy Brown      : 1960      MRN: [id number]  Encounter Provider: SHAWNA Diallo  Encounter Date: 2024   Encounter department: [facility]    Assessment & Plan     1. Type 2 diabetes mellitus with obesity  (HCC)  -     semaglutide, 0.25 or 0.5 mg/dose, (Ozempic, 0.25 or 0.5 MG/DOSE,) 2 mg/3 mL injection pen; Inject 0.375 mL (0.25 mg total) under the skin every 7 days  -     Comprehensive metabolic panel; Future; Expected date: 2024  -     Lipid Panel with Direct LDL reflex; Future; Expected date: 2024  -     Hemoglobin A1C; Future; Expected date: 2024  -     Comprehensive metabolic panel; Future; Expected date: 2024  -     Diabetic foot exam    Hemoglobin A1c is 7.4.   Patient instructed to eat a healthy low fat/ diabetic diet.   Continue metformin as prescribed.   Ozempic prescribed. Medication information and side effects reviewed.   Repeat CMP in 1 month.   Repeat hemoglobin A1c in 3 months.     2. Other cirrhosis of liver (HCC)  Continue to follow-up with gastroenterology.     3. Essential hypertension  /70.   Continue current medications.     4. Anxiety  Patient takes xanax prn.   Patient reports that she rarely takes it.   Continue xanax prn.     Reviewed lab results with the patient.   Patient instructed to follow-up in 3 months or sooner prn.            Subjective      Patient is here for a follow-up for chronic medical conditions.   Patient takes metformin for type 2 DM. Denies any dizziness, Has, nausea, or vomiting.   Patient is here for a follow-up for HTN. Patient takes her medications as prescribed.   Denies any chest pain, SOB, or palpitations.   Patient is here for a follow-up for anxiety. Patient reports that she rarely takes the xanax.   Patient follows up with gastroenterology for liver cirrhosis.       Review of Systems   Constitutional:  Negative for chills and fever.   HENT:  Negative for ear pain, sinus  "pressure and sore throat.    Respiratory:  Negative for cough, chest tightness, shortness of breath and wheezing.    Cardiovascular:  Negative for chest pain, palpitations and leg swelling.   Gastrointestinal:  Negative for abdominal pain, blood in stool, diarrhea, nausea and vomiting.   Genitourinary:  Negative for dysuria, frequency, hematuria, pelvic pain and urgency.   Skin:  Negative for rash.   Neurological:  Negative for dizziness, seizures, syncope, light-headedness and headaches.   Psychiatric/Behavioral:  Negative for suicidal ideas.         As noted in HPI.        Current Outpatient Medications on File Prior to Visit   Medication Sig    Accu-Chek Guide test strip USE TO CHECK BLOOD SUGAR ONCE DAILY AS DIRECTED (Patient not taking: Reported on 8/21/2023)    ALPRAZolam (XANAX) 0.25 mg tablet Take 1 tablet (0.25 mg total) by mouth daily at bedtime as needed for anxiety    atorvastatin (LIPITOR) 20 mg tablet TAKE ONE TABLET BY MOUTH EVERY DAY    carvedilol (COREG) 12.5 mg tablet TAKE ONE TABLET BY MOUTH TWICE A DAY WITH MEALS    cetirizine (ZyrTEC) 10 mg tablet Take 10 mg by mouth daily (Patient not taking: Reported on 2/28/2024)    Diclofenac Sodium (VOLTAREN) 1 % Apply 2 g topically 4 (four) times a day    ibuprofen (MOTRIN) 800 mg tablet Take 1 tablet (800 mg total) by mouth every 8 (eight) hours as needed for mild pain    lisinopril-hydrochlorothiazide (PRINZIDE,ZESTORETIC) 10-12.5 MG per tablet TAKE ONE TABLET BY MOUTH EVERY DAY    metFORMIN (GLUCOPHAGE) 500 mg tablet TAKE ONE TABLET BY MOUTH TWICE A DAY WITH MEALS    Multiple Vitamin (multivitamin) capsule Take 1 capsule by mouth daily       Objective     /70   Pulse 67   Resp 16   Ht 5' 4\" (1.626 m)   Wt 120 kg (264 lb)   SpO2 98%   BMI 45.32 kg/m²     Physical Exam  Vitals reviewed.   Constitutional:       General: She is not in acute distress.     Appearance: She is obese. She is not ill-appearing or diaphoretic.   HENT:      Right Ear: " External ear normal.      Left Ear: External ear normal.      Nose: Nose normal.      Mouth/Throat:      Mouth: Mucous membranes are moist.      Pharynx: Oropharynx is clear. No oropharyngeal exudate or posterior oropharyngeal erythema.   Eyes:      Conjunctiva/sclera: Conjunctivae normal.      Pupils: Pupils are equal, round, and reactive to light.   Cardiovascular:      Rate and Rhythm: Normal rate and regular rhythm.      Pulses: Normal pulses. no weak pulses.           Dorsalis pedis pulses are 2+ on the right side and 2+ on the left side.      Heart sounds: Normal heart sounds.      Comments: No edema noted.   Pulmonary:      Effort: Pulmonary effort is normal. No respiratory distress.      Breath sounds: Normal breath sounds. No wheezing.   Feet:      Right foot:      Skin integrity: No ulcer, skin breakdown, warmth, callus or dry skin.      Left foot:      Skin integrity: No ulcer, skin breakdown, warmth, callus or dry skin.   Skin:     Findings: No rash.   Neurological:      Mental Status: She is alert and oriented to person, place, and time.   Psychiatric:         Mood and Affect: Mood normal.         Diabetic Foot Exam    Patient's shoes and socks removed.    Right Foot/Ankle   Right Foot Inspection  Skin Exam: skin normal and skin intact. No dry skin, no warmth, no callus, no maceration, no pre-ulcer, no ulcer and no callus.     Toe Exam: ROM and strength within normal limits. No swelling, no tenderness and  no right toe deformity    Sensory   Monofilament testing: intact    Vascular  Capillary refills: < 3 seconds  The right DP pulse is 2+.     Left Foot/Ankle  Left Foot Inspection  Skin Exam: skin normal and skin intact. No dry skin, no warmth, no maceration, no pre-ulcer, no ulcer and no callus.     Toe Exam: ROM and strength within normal limits. No swelling, no tenderness and no left toe deformity.     Sensory   Monofilament testing: intact    Vascular  Capillary refills: < 3 seconds  The left DP  pulse is 2+.     Assign Risk Category  No deformity present  No loss of protective sensation  No weak pulses  Risk: 0    SHAWNA Diallo

## 2024-04-17 NOTE — PROGRESS NOTES
PT Re-Evaluation     Today's date: 2024  Patient name: Kristy Brown  : 1960  MRN: [id number]  Referring provider: Kristy Singh, *  Dx:   Encounter Diagnosis     ICD-10-CM    1. Right wrist pain  M25.531       2. Swelling of right wrist  M25.431           Start Time: 1400  Stop Time: 1445  Total time in clinic (min): 45 minutes    Assessment  Assessment details: Kristy Brown has been compliant with attending PT and home exercise program since initial eval.  Kristy  has made improvements in objective data since initial eval but continues to have limitations compared to prior level of function. She presents with decreased frequency of sharp pain as well as no longer reporting the deep ache that she began with.  She has been able to perform more activities such as putting dishes away an driving, though will have sharp pain when she over exerts herself.  She presents with improving  strength as well as wrist strength, though continues with pain during those activities. Kristy continues to have deficits in the above listed impairments and would benefit from additional skilled PT to address these deficits to return to prior level of function.      Impairments: abnormal coordination, abnormal muscle firing, activity intolerance, difficulty understanding, impaired physical strength, lacks appropriate home exercise program and pain with function    Symptom irritability: moderateUnderstanding of Dx/Px/POC: good   Prognosis: good  Prognosis details: Positive prognostic indicators include positive attitude toward recovery and good understanding of diagnosis and treatment plan options.  Negative prognostic indicators include chronicity of symptoms, high symptom irritability, minimal changes in pain with movement, and multiple concurrent orthopedic problems.      Goals  STG: to be achieved in 4-6 weeks  1)Reduce pain by to 0/10 at rest- progressing  2)Improve pain free  strength by  symmetrical bilaterally- progressing  3)Improve flexibility to wrist muscles to WNL - progressing    LTG: to be achieved at discharge (6-8 weeks)  1)Improve FOTO to greater than or equal to predicted outcome- progressing  2)Improve ADL's in related activities to maximal level of function- progressing  3)Improve  strength to pain free with maximal force - progressing  4)Patient is able to return to driving activities without increased pain and discomfort.- progressing    Plan  Patient would benefit from: skilled physical therapy  Planned modality interventions: Modalities PRN.  Planned therapy interventions: activity modification, manual therapy, neuromuscular re-education, patient education, therapeutic activities, therapeutic exercise, graded activity, home exercise program, behavior modification, self care, IASTM, joint mobilization and kinesiology taping  Frequency: 2x week  Duration in weeks: 4  Treatment plan discussed with: patient      Subjective Evaluation    History of Present Illness  Mechanism of injury: Re-Eval 4/17:  Pt reports that she feels as though she is about 70% better since starting physical therapy.  She reports that she has less frequency of sharp pain as well as reporting she no longer has the deep ache she began treatment with.  She reports that she continues with some sensitivity, though less frequency.  She continuing discomfort during driving activities, it can also return with hand washing, or the wrong movements.  She reports that she is now able to eat and hold her utensils with that hand.     IE:  WORK/SCHOOL: retired  HOME LIFE: with others  HOBBIES/EXERCISE: embroidery, gardening, walking, plays, spending time with friends, dog,   PLOF:  R wrist break greater than 20 years ago  HISTORY OF CURRENT INJURY:  Pt reports that she has R wrist and thumb pain.  She feels as though it is getting worse. She reports that it began about 3 or more months ago, beginning with achiness.  Sharp  pain began about 2-3 months ago which keep increasing. Pt is R hand dominant  PAIN LOCATION/DESCRIPTORS: posterior lateral wrist pain, radiates down thumb, can include first finger   AGGRAVATING FACTORS:  opening jars, opening cans, embroidery, movement, washing hands, lifting things, holding things for periods of time, writing, driving,   EASES: ice, resting, rubbing it,   DAY PATTERN: no day pattern, just with movement  IMAGING: x-ray no significant findings,   Kristy denies a new onset of Bladder incontinence, Bowel dysfunction, Dizziness, Dysphagia, Dysarthria, Drop attacks, Diplopia, Nausea, Ataxia, Recent unexplained weight loss, Clumsy or unsteadiness, Constant night pain, History of cancer, Tingling, Numbness, and Saddle anesthesia .  PATIENT GOALS: decrease pain, hold things in hand,     Patient Goals  Patient goals for therapy: decreased edema, decreased pain, increased motion, independence with ADLs/IADLs and increased strength    Pain  Current pain ratin  At best pain ratin  At worst pain rating: 10  Quality: discomfort, sharp and radiating  Relieving factors: rest  Aggravating factors: keyboarding, overhead activity and lifting        Objective     Palpation     Additional Palpation Details  No TTP of wrist, hand, or elbow musculature though noted sensitivity to the area.      Neurological Testing     Additional Neurological Details  No noted neurological changes to note    Active Range of Motion     Left Wrist   Wrist flexion: WFL  Wrist extension: WFL  Radial deviation: WFL  Ulnar deviation: WFL      Right Wrist   Wrist flexion: WFL  Wrist extension: WFl  Radial deviation: WFL  Ulnar deviation: WFL    Additional Active Range of Motion Details  All motions WFL    Strength/Myotome Testing     Left Wrist/Hand   Wrist extension: 4  Wrist flexion: 4  Radial deviation: 4-  Ulnar deviation: 4-     (2nd hand position)     Trial 1: 20    Trial 2: 18    Trial 3: 15    Average: 17.67    Thumb  "Strength  Key/Lateral Pinch     Trial 1: 10    Trial 2: 9    Trial 3: 10    Average: 9.67  Tip/Two-Point Pinch     Trial 1: 11    Trial 2: 12    Trial 3: 11    Average: 11.33    Right Wrist/Hand   Wrist extension: 4+  Wrist flexion: 4  Radial deviation: 4-  Ulnar deviation: 4-     (2nd hand position)     Trial 1: 3    Trial 2: 3    Trial 3: 3    Average: 3    Thumb Strength   Key/Lateral Pinch     Trial 1: 9    Trial 2: 10    Trial 3: 11    Average: 10  Tip/Two-Point Pinch     Trial 1: 4    Trial 2: 4    Trial 3: 5    Average: 4.33    Additional Strength Details  Painful extension and AB strength R thumb    Tests     Left Wrist/Hand   Negative Finkelstein's.     Right Wrist/Hand   Positive Finkelstein's.     Swelling     Right Wrist/Hand     Additional Swelling Details  No noted swelling today      Flowsheet Rows      Flowsheet Row Most Recent Value   PT/OT G-Codes    Current Score 48   Projected Score 66                POC Expires Auth Status Start Date Expiration Date PT Visit Limit    4/12 N/A   As needed   Date 3/29 4/3 4/5 4/10 4/17- RE   Used 6 7 8 9 10   Remaining          Diagnosis: R wrist   Precautions:    Primary Goals:  strength, wrist endurance, flexibility   *asterisks by exercise = given for HEP   Manuals 3/29 4/3 4/5 4/10 4/17   IASTM AK       PROM        Distraction mobs AK       Taping AK AK AK ALEJANDRO             There Ex        Wrist flexion stretch elbow bent 10x10\" 10x10\" 10x10\" 10\"x10 10\"x10   Wrist flexion stretch elbow bent 10x10\" 10x10\" 10x10\" 10\"x10 10\"x10   Wrist flexion stretch elbow straight 10x10\" 10x10\" 10x10\" 10\"x10 10\"x10   Wrist flexion stretch elbow straight 10x10\" 10x10\" 10x10\" 10\"x10 10\"x10   Wrist flexion elbow straight with fingers 10x10\" 10x10\" 10x10\" 10\"x10 10\"x10                           UBE        Neuro Re-Ed        Wrist extension 2x10 2x10 1#      Wrist flexion  1x10 red flexbar      Supination  20x5\" hammer painful 10x 5\" active    Pronation  20x5\" hammer painful " "10x 5\" active     Finger extension 2x10 2x10      Thumb AB/AD 2x10 on table  2x10 on table 15x no band  5x rubber band   On table    Thumb flex/ext   2x10 on table 2x10 on table      2s10 red       Thumb adduction  1x10                               Re-evaluation         Ther Act         Education                 Modalities        EPAT  Wrist extensors     2 rounds bar 1.5  D20-S 1st  D20-T 2nd  AK 2 rounds 2.0                                                              "

## 2024-04-17 NOTE — PROGRESS NOTES
"Daily Note     Today's date: 2024  Patient name: Kristy Brown  : 1960  MRN: [id number]  Referring provider: Kristy Singh, *  Dx:   Encounter Diagnosis     ICD-10-CM    1. Right wrist pain  M25.531       2. Swelling of right wrist  M25.431                      Subjective: ***      Objective: See treatment diary below      Assessment: Tolerated treatment {Tolerated treatment :[id number]}. Patient {assessment:3191626044}      Plan: {PLAN:[id number]}      POC Expires Auth Status Start Date Expiration Date PT Visit Limit     N/A   As needed   Date 3/29 4/3 4/5 4/10    Used 6 7 8 9    Remaining          Diagnosis: R wrist   Precautions:    Primary Goals:  strength, wrist endurance, flexibility   *asterisks by exercise = given for HEP   Manuals 3/29 4/3 4/5 4/10    IASTM AK       PROM        Distraction mobs AK       Taping AK AK AK ALEJANDRO             There Ex        Wrist flexion stretch elbow bent 10x10\" 10x10\" 10x10\" 10\"x10    Wrist flexion stretch elbow bent 10x10\" 10x10\" 10x10\" 10\"x10    Wrist flexion stretch elbow straight 10x10\" 10x10\" 10x10\" 10\"x10    Wrist flexion stretch elbow straight 10x10\" 10x10\" 10x10\" 10\"x10    Wrist flexion elbow straight with fingers 10x10\" 10x10\" 10x10\" 10\"x10                            UBE        Neuro Re-Ed        Wrist extension 2x10 2x10 1#      Wrist flexion  1x10 red flexbar      Supination  20x5\" hammer painful 10x 5\" active    Pronation  20x5\" hammer painful 10x 5\" active     Finger extension 2x10 2x10      Thumb AB/AD 2x10 on table  2x10 on table 15x no band  5x rubber band   On table    Thumb flex/ext   2x10 on table 2x10 on table      2s10 red       Thumb adduction  1x10                               Re-evaluation         Ther Act         Education                 Modalities        EPAT  Wrist extensors     2 rounds bar 1.5  D20-S 1st  D20-T 2nd                                                                                "

## 2024-04-19 ENCOUNTER — OFFICE VISIT (OUTPATIENT)
Dept: GASTROENTEROLOGY | Facility: CLINIC | Age: 64
End: 2024-04-19
Payer: MEDICARE

## 2024-04-19 ENCOUNTER — OFFICE VISIT (OUTPATIENT)
Dept: PHYSICAL THERAPY | Facility: CLINIC | Age: 64
End: 2024-04-19
Payer: MEDICARE

## 2024-04-19 ENCOUNTER — PREP FOR PROCEDURE (OUTPATIENT)
Dept: GASTROENTEROLOGY | Facility: CLINIC | Age: 64
End: 2024-04-19

## 2024-04-19 ENCOUNTER — TELEPHONE (OUTPATIENT)
Dept: GASTROENTEROLOGY | Facility: CLINIC | Age: 64
End: 2024-04-19

## 2024-04-19 VITALS
DIASTOLIC BLOOD PRESSURE: 83 MMHG | WEIGHT: 264 LBS | HEART RATE: 62 BPM | SYSTOLIC BLOOD PRESSURE: 132 MMHG | BODY MASS INDEX: 45.07 KG/M2 | HEIGHT: 64 IN

## 2024-04-19 DIAGNOSIS — M25.431 SWELLING OF RIGHT WRIST: ICD-10-CM

## 2024-04-19 DIAGNOSIS — K74.69 OTHER CIRRHOSIS OF LIVER (HCC): Primary | ICD-10-CM

## 2024-04-19 DIAGNOSIS — M25.531 RIGHT WRIST PAIN: Primary | ICD-10-CM

## 2024-04-19 PROCEDURE — 97140 MANUAL THERAPY 1/> REGIONS: CPT

## 2024-04-19 PROCEDURE — 97110 THERAPEUTIC EXERCISES: CPT

## 2024-04-19 PROCEDURE — 99213 OFFICE O/P EST LOW 20 MIN: CPT | Performed by: INTERNAL MEDICINE

## 2024-04-19 NOTE — TELEPHONE ENCOUNTER
Scheduled date of EGD(as of today): 7/24/24  Physician performing EGD: Dr. Cramer  Location of EGD:   Instructions reviewed with patient by: tl given at appt  Clearances:     Diabetic-  Metformin  Ozempic- 7 day hold

## 2024-04-19 NOTE — PROGRESS NOTES
[facility] - Outpatient Follow-up Note  Kristy Brown 63 y.o. female MRN: [id number]  Encounter: [id number]          ASSESSMENT AND PLAN:      1. Other cirrhosis of liver (HCC)  Doing well.  MELD 3.0  7.  Repeat ultrasound in August.  Will plan EGD to screen for varices.  Continue carvedilol.  Will continue to work toward weight loss.  Hopefully starting Ozempic will be helpful in this regard.  Will follow MELD labs    - EGD; Future    ______________________________________________________________________    SUBJECTIVE: Patient with history of MASH cirrhosis.  Has been doing very well with no abdominal pain, edema, jaundice, confusion or forgetfulness, bruising or bleeding.  Most recent labs and ultrasound reviewed.  MELD 7.  Weight has been essentially stable.  Will be starting Ozempic for treatment of her diabetes      REVIEW OF SYSTEMS:    ROS       Historical Information   Past Medical History:   Diagnosis Date    Cirrhosis (HCC)     Diabetes mellitus (HCC)     Ear problems     GERD (gastroesophageal reflux disease)     HL (hearing loss)     Hypertension     Seasonal allergies     Thrombocytopenia, unspecified (HCC)     Per CMS ICD 10 Guidelines--Per Phyisician    Tinnitus      Past Surgical History:   Procedure Laterality Date    ACHILLES TENDON REPAIR Right     COLONOSCOPY      HYSTERECTOMY      age 40    UPPER GASTROINTESTINAL ENDOSCOPY       Social History   Social History     Substance and Sexual Activity   Alcohol Use No    Comment: Sober for 20 years     Social History     Substance and Sexual Activity   Drug Use No     Social History     Tobacco Use   Smoking Status Every Day    Current packs/day: 0.25    Average packs/day: 0.3 packs/day for 40.0 years (10.0 ttl pk-yrs)    Types: Cigarettes   Smokeless Tobacco Never     Family History   Problem Relation Age of Onset    Lung cancer Sister     Cancer Sister     Hypertension Mother     No Known Problems Father     No Known  "Problems Maternal Grandmother     No Known Problems Maternal Grandfather     No Known Problems Paternal Grandmother     No Known Problems Paternal Grandfather     No Known Problems Brother     No Known Problems Brother        Meds/Allergies       Current Outpatient Medications:     ALPRAZolam (XANAX) 0.25 mg tablet    atorvastatin (LIPITOR) 20 mg tablet    carvedilol (COREG) 12.5 mg tablet    Diclofenac Sodium (VOLTAREN) 1 %    ibuprofen (MOTRIN) 800 mg tablet    lisinopril-hydrochlorothiazide (PRINZIDE,ZESTORETIC) 10-12.5 MG per tablet    metFORMIN (GLUCOPHAGE) 500 mg tablet    Multiple Vitamin (multivitamin) capsule    semaglutide, 0.25 or 0.5 mg/dose, (Ozempic, 0.25 or 0.5 MG/DOSE,) 2 mg/3 mL injection pen    Accu-Chek Guide test strip    cetirizine (ZyrTEC) 10 mg tablet    Allergies   Allergen Reactions    Penicillins            Objective     Blood pressure 132/83, pulse 62, height 5' 4\" (1.626 m), weight 120 kg (264 lb). Body mass index is 45.32 kg/m².      PHYSICAL EXAM:      Physical Exam  Vitals and nursing note reviewed.   Constitutional:       General: She is not in acute distress.     Appearance: She is obese. She is not ill-appearing.   HENT:      Head: Normocephalic and atraumatic.   Eyes:      General: No scleral icterus.     Extraocular Movements: Extraocular movements intact.   Cardiovascular:      Rate and Rhythm: Normal rate.   Pulmonary:      Effort: Pulmonary effort is normal. No respiratory distress.   Abdominal:      General: There is no distension.      Palpations: Abdomen is soft.      Tenderness: There is no abdominal tenderness. There is no guarding or rebound.   Musculoskeletal:      Right lower leg: No edema.      Left lower leg: No edema.   Skin:     General: Skin is warm and dry.      Coloration: Skin is not cyanotic.      Findings: No erythema.   Neurological:      General: No focal deficit present.      Mental Status: She is alert and oriented to person, place, and time. "   Psychiatric:         Mood and Affect: Mood normal.         Behavior: Behavior normal.          Lab Results:   No visits with results within 1 Day(s) from this visit.   Latest known visit with results is:   Appointment on 04/12/2024   Component Date Value    Hemoglobin A1C 04/12/2024 7.4 (H)     EAG 04/12/2024 166     Sodium 04/12/2024 138     Potassium 04/12/2024 4.2     Chloride 04/12/2024 102     CO2 04/12/2024 25     ANION GAP 04/12/2024 11     BUN 04/12/2024 10     Creatinine 04/12/2024 0.59 (L)     Glucose, Fasting 04/12/2024 171 (H)     Calcium 04/12/2024 9.1     AST 04/12/2024 34     ALT 04/12/2024 36     Alkaline Phosphatase 04/12/2024 87     Total Protein 04/12/2024 6.6     Albumin 04/12/2024 4.0     Total Bilirubin 04/12/2024 0.67     eGFR 04/12/2024 96     WBC 04/12/2024 5.22     RBC 04/12/2024 4.78     Hemoglobin 04/12/2024 15.0     Hematocrit 04/12/2024 45.7     MCV 04/12/2024 96     MCH 04/12/2024 31.4     MCHC 04/12/2024 32.8     RDW 04/12/2024 12.6     MPV 04/12/2024 10.8     Platelets 04/12/2024 172     nRBC 04/12/2024 0     Segmented % 04/12/2024 61     Immature Grans % 04/12/2024 0     Lymphocytes % 04/12/2024 29     Monocytes % 04/12/2024 6     Eosinophils Relative 04/12/2024 3     Basophils Relative 04/12/2024 1     Absolute Neutrophils 04/12/2024 3.20     Absolute Immature Grans 04/12/2024 0.02     Absolute Lymphocytes 04/12/2024 1.51     Absolute Monocytes 04/12/2024 0.31     Eosinophils Absolute 04/12/2024 0.14     Basophils Absolute 04/12/2024 0.04     Cholesterol 04/12/2024 141     Triglycerides 04/12/2024 118     HDL, Direct 04/12/2024 37 (L)     LDL Calculated 04/12/2024 80          Radiology Results:   No results found.

## 2024-04-19 NOTE — PROGRESS NOTES
"Daily Note     Today's date: 2024  Patient name: Kristy Brown  : 1960  MRN: [id number]  Referring provider: Kristy Singh, *  Dx:   Encounter Diagnosis     ICD-10-CM    1. Right wrist pain  M25.531       2. Swelling of right wrist  M25.431                      Subjective: Pt reports that she continues with the sharp pain at random times, though the typical achiness has significantly decreased.       Objective: See treatment diary below      Assessment: Tolerated treatment well. Focused today on decreasing trigger points and tightness within wrist extensor musculature.  Followed up with stretching and attempted flexbar strengthening.  Pt had sharp pain occur with gripping today.  Added taping with good tolerance.  Patient would benefit from continued PT      Plan: Continue per plan of care.       POC Expires Auth Status Start Date Expiration Date PT Visit Limit     N/A   As needed   Date 4/19 4/3 4/5 4/10 4/17- RE   Used 11 7 8 9 10   Remaining          Diagnosis: R wrist   Precautions:    Primary Goals:  strength, wrist endurance, flexibility   *asterisks by exercise = given for HEP   Manuals  4/3 4/5 4/10 4/17   IASTM        PROM        Distraction mobs        Taping AK AK AK ALEJANDRO             There Ex        Wrist flexion stretch elbow bent 10x10\" 10x10\" 10x10\" 10\"x10 10\"x10   Wrist flexion stretch elbow bent 10x10\" 10x10\" 10x10\" 10\"x10 10\"x10   Wrist flexion stretch elbow straight 10x10\" 10x10\" 10x10\" 10\"x10 10\"x10   Wrist flexion stretch elbow straight 10x10\" 10x10\" 10x10\" 10\"x10 10\"x10   Wrist flexion elbow straight with fingers 10x10\" 10x10\" 10x10\" 10\"x10 10\"x10                           UBE        Neuro Re-Ed        Wrist extension painful 2x10 1#      Wrist flexion painful 1x10 red flexbar      Supination painful 20x5\" hammer painful 10x 5\" active    Pronation painful 20x5\" hammer painful 10x 5\" active     Finger extension  2x10      Thumb AB/AD   2x10 on table 15x no band  5x " rubber band   On table    Thumb flex/ext   2x10 on table 2x10 on table             Thumb adduction  1x10                               Re-evaluation         Ther Act         Education                 Modalities        EPAT  Wrist extensors     2 rounds bar 1.5  D20-S 1st  D20-T 2nd  AK 2 rounds 2.0

## 2024-04-24 ENCOUNTER — OFFICE VISIT (OUTPATIENT)
Dept: PHYSICAL THERAPY | Facility: CLINIC | Age: 64
End: 2024-04-24
Payer: MEDICARE

## 2024-04-24 DIAGNOSIS — M25.431 SWELLING OF RIGHT WRIST: ICD-10-CM

## 2024-04-24 DIAGNOSIS — M25.531 RIGHT WRIST PAIN: Primary | ICD-10-CM

## 2024-04-24 PROCEDURE — 97140 MANUAL THERAPY 1/> REGIONS: CPT

## 2024-04-24 PROCEDURE — 97110 THERAPEUTIC EXERCISES: CPT

## 2024-04-24 NOTE — PROGRESS NOTES
"Daily Note     Today's date: 2024  Patient name: Kristy Brown  : 1960  MRN: [id number]  Referring provider: Kristy Singh, *  Dx:   Encounter Diagnosis     ICD-10-CM    1. Right wrist pain  M25.531       2. Swelling of right wrist  M25.431           Start Time: 1400  Stop Time: 1445  Total time in clinic (min): 45 minutes    Subjective: Pt reports that she had been embroidering over the weekend which irritated it.        Objective: See treatment diary below      Assessment: Tolerated treatment well. Treatment today began with epat machine with pt having most discomfort in distal forearm.  Continued with IASTM with good tolerance, though trigger points noted throughout primarily in distal extensor musculature.  Moved onto stretching.  Pt was able to perform strengthening activities today with decreased discomfort. Patient would benefit from continued PT      Plan: Continue per plan of care.       POC Expires Auth Status Start Date Expiration Date PT Visit Limit     N/A   As needed   Date 4/19 4/3 4/5 4/10 4/17- RE   Used 11 7 8 9 10   Remaining          Diagnosis: R wrist   Precautions:    Primary Goals:  strength, wrist endurance, flexibility   *asterisks by exercise = given for HEP   Manuals  4/24 4/5 4/10 4/17   IASTM        PROM        Distraction mobs        Taping AK AK AK ALEJANDRO             There Ex        Wrist flexion stretch elbow bent 10x10\" 10x10\" 10x10\" 10\"x10 10\"x10   Wrist flexion stretch elbow bent 10x10\" 10x10\" 10x10\" 10\"x10 10\"x10   Wrist flexion stretch elbow straight 10x10\" 10x10\" 10x10\" 10\"x10 10\"x10   Wrist flexion stretch elbow straight 10x10\" 10x10\" 10x10\" 10\"x10 10\"x10   Wrist flexion elbow straight with fingers 10x10\" 10x10\" 10x10\" 10\"x10 10\"x10                           UBE        Neuro Re-Ed        Wrist extension painful 2x10 2#      Wrist flexion painful 2x10 2#      Supination painful  painful 10x 5\" active    Pronation painful  painful 10x 5\" active   "   Finger extension        Thumb AB/AD   2x10 on table 15x no band  5x rubber band   On table    Thumb flex/ext   2x10 on table 2x10 on table             Thumb adduction        Radial deviation  1x10 2#                       Re-evaluation         Ther Act         Education                 Modalities        EPAT  Wrist extensors     2 rounds bar 1.5  D20-S 1st  D20-T 2nd  AK 2 rounds 2.0

## 2024-04-26 ENCOUNTER — OFFICE VISIT (OUTPATIENT)
Dept: PHYSICAL THERAPY | Facility: CLINIC | Age: 64
End: 2024-04-26
Payer: MEDICARE

## 2024-04-26 DIAGNOSIS — M25.431 SWELLING OF RIGHT WRIST: ICD-10-CM

## 2024-04-26 DIAGNOSIS — M25.531 RIGHT WRIST PAIN: Primary | ICD-10-CM

## 2024-04-26 PROCEDURE — 97140 MANUAL THERAPY 1/> REGIONS: CPT

## 2024-04-26 PROCEDURE — 97112 NEUROMUSCULAR REEDUCATION: CPT

## 2024-04-26 PROCEDURE — 97110 THERAPEUTIC EXERCISES: CPT

## 2024-04-26 NOTE — PROGRESS NOTES
"Daily Note     Today's date: 2024  Patient name: Kristy Brown  : 1960  MRN: [id number]  Referring provider: Kristy Singh, *  Dx:   Encounter Diagnosis     ICD-10-CM    1. Right wrist pain  M25.531       2. Swelling of right wrist  M25.431           Start Time: 1300  Stop Time: 1345  Total time in clinic (min): 45 minutes    Subjective: Pt reports that she didn't have any increased pain after increased strengthening activities last treatment session.        Objective: See treatment diary below      Assessment: Tolerated treatment well. Began today with manual interventions beginning with TPR followed by graston.  Pt presented with increased tolerance to pressure and decreased trigger points with continuation.  Pt tolerated 2# weight well today, having some sharp pint intermittently with first or second rep, but was able to continue with pain decreasing.  Finished with taping to promote AB at thumb. Patient would benefit from continued PT      Plan: Continue per plan of care.       POC Expires Auth Status Start Date Expiration Date PT Visit Limit     N/A   As needed   Date 4/19 4/3 4/5 4/10 4/17- RE   Used 11 7 8 9 10   Remaining          Diagnosis: R wrist   Precautions:    Primary Goals:  strength, wrist endurance, flexibility   *asterisks by exercise = given for HEP   Manuals     IASTM        PROM        Distraction mobs        Taping AK AK              There Ex        Wrist flexion stretch elbow bent 10x10\" 10x10\" 10x10\"  10\"x10   Wrist flexion stretch elbow bent 10x10\" 10x10\" 10x10\"  10\"x10   Wrist flexion stretch elbow straight 10x10\" 10x10\" 10x10\"  10\"x10   Wrist flexion stretch elbow straight 10x10\" 10x10\" 10x10\"  10\"x10   Wrist flexion elbow straight with fingers 10x10\" 10x10\" 10x10\"  10\"x10                           UBE        Neuro Re-Ed        Wrist extension painful 2x10 2# 1x10, 1x10x3\" 2#     Wrist flexion painful 2x10 2# 1x10, 1x10x3\" 2#     Supination " painful       Pronation painful       Finger extension        Thumb AB/AD        Thumb flex/ext                Thumb adduction        Radial deviation  1x10 2#      Hammer curl   2x10 2#              Re-evaluation         Ther Act         Education                 Modalities        EPAT  Wrist extensors      AK 2 rounds 2.0

## 2024-05-01 ENCOUNTER — OFFICE VISIT (OUTPATIENT)
Dept: PHYSICAL THERAPY | Facility: CLINIC | Age: 64
End: 2024-05-01
Payer: MEDICARE

## 2024-05-01 DIAGNOSIS — M25.431 SWELLING OF RIGHT WRIST: ICD-10-CM

## 2024-05-01 DIAGNOSIS — M25.531 RIGHT WRIST PAIN: Primary | ICD-10-CM

## 2024-05-01 PROCEDURE — 97110 THERAPEUTIC EXERCISES: CPT

## 2024-05-01 PROCEDURE — 97112 NEUROMUSCULAR REEDUCATION: CPT

## 2024-05-01 PROCEDURE — 97140 MANUAL THERAPY 1/> REGIONS: CPT

## 2024-05-01 NOTE — PROGRESS NOTES
"Daily Note     Today's date: 2024  Patient name: Kristy Brown  : 1960  MRN: [id number]  Referring provider: Kristy Singh, *  Dx:   Encounter Diagnosis     ICD-10-CM    1. Right wrist pain  M25.531       2. Swelling of right wrist  M25.431           Start Time: 1400  Stop Time: 1445  Total time in clinic (min): 45 minutes    Subjective: Pt reports that she feels as though things are getting better and that her sharp pain is not as intense when she does get it.       Objective: See treatment diary below      Assessment: Tolerated treatment well. Today began with manual interventions including TPR as well as IASTM.  Pt had more tightness and trigger points in distal forearm that decreased with manual interventions.  Continued to strengthening.  Pt was able to perform AROM of the wrist without sharp pain occurring.  Added stretching and exercises to continue to promote wrist strength as well as elbow strength. Patient demonstrated fatigue post treatment, exhibited good technique with therapeutic exercises, and would benefit from continued PT      Plan: Continue per plan of care.       POC Expires Auth Status Start Date Expiration Date PT Visit Limit     N/A   As needed   Date 4/19 5/1 4/5 4/10 4/17- RE   Used 11 12 8 9 10   Remaining          Diagnosis: R wrist   Precautions:    Primary Goals:  strength, wrist endurance, flexibility   *asterisks by exercise = given for HEP   Manuals      IASTM        PROM        Distraction mobs        Taping AK AK              There Ex        Wrist flexion stretch elbow bent 10x10\" 10x10\" 10x10\" 10x10\"    Wrist flexion stretch elbow bent 10x10\" 10x10\" 10x10\" 10x10\"    Wrist flexion stretch elbow straight 10x10\" 10x10\" 10x10\" 10x10\"    Wrist flexion stretch elbow straight 10x10\" 10x10\" 10x10\" 10x10\"    Wrist flexion elbow straight with fingers 10x10\" 10x10\" 10x10\" 10x10\"                            UBE        Neuro Re-Ed        Wrist " "extension painful 2x10 2# 1x10, 1x10x3\" 2# 2x10 AROM hold and slow eccentric    Wrist flexion painful 2x10 2# 1x10, 1x10x3\" 2#     Supination painful       Pronation painful       Finger extension        Thumb AB/AD        Thumb flex/ext                Thumb adduction        Radial deviation  1x10 2#      Hammer curl   2x10 2# 2x10 3#             Re-evaluation         Ther Act         Education                 Modalities        EPAT  Wrist extensors                                                                          "

## 2024-05-03 ENCOUNTER — OFFICE VISIT (OUTPATIENT)
Dept: PHYSICAL THERAPY | Facility: CLINIC | Age: 64
End: 2024-05-03
Payer: MEDICARE

## 2024-05-03 DIAGNOSIS — M25.431 SWELLING OF RIGHT WRIST: ICD-10-CM

## 2024-05-03 DIAGNOSIS — M25.531 RIGHT WRIST PAIN: Primary | ICD-10-CM

## 2024-05-03 PROCEDURE — 97112 NEUROMUSCULAR REEDUCATION: CPT

## 2024-05-03 PROCEDURE — 97110 THERAPEUTIC EXERCISES: CPT

## 2024-05-03 PROCEDURE — 97140 MANUAL THERAPY 1/> REGIONS: CPT

## 2024-05-03 NOTE — PROGRESS NOTES
"Daily Note     Today's date: 5/3/2024  Patient name: Kristy Brown  : 1960  MRN: [id number]  Referring provider: Kristy Singh, *  Dx:   Encounter Diagnosis     ICD-10-CM    1. Right wrist pain  M25.531       2. Swelling of right wrist  M25.431           Start Time: 1345  Stop Time: 1430  Total time in clinic (min): 45 minutes    Subjective: Pt reports that she was doing laundry today and had no instance of sharp pain.       Objective: See treatment diary below      Assessment: Tolerated treatment well. Began with manual interventions to continued to decrease tightness and trigger points within wrist extensor musculature.  Moved onto stretching with pt able to add addition of fingers as well.  Pt was able to tolerate flexbar strengthening today without increase in pain and discomfort within thumb. Patient demonstrated fatigue post treatment, exhibited good technique with therapeutic exercises, and would benefit from continued PT      Plan: Continue per plan of care.       POC Expires Auth Status Start Date Expiration Date PT Visit Limit     N/A   As needed   Date 4/19 5/1 5/3 4/10 4/17- RE   Used 11 12 13 9 10   Remaining          Diagnosis: R wrist   Precautions:    Primary Goals:  strength, wrist endurance, flexibility   *asterisks by exercise = given for HEP   Manuals  4/24 4/26 5/1 5/3   IASTM     AK   PROM        Distraction mobs        Taping AK AK              There Ex        Wrist flexion stretch elbow bent 10x10\" 10x10\" 10x10\" 10x10\" 10x10\"   Wrist flexion stretch elbow bent 10x10\" 10x10\" 10x10\" 10x10\" 10x10\"   Wrist flexion stretch elbow straight 10x10\" 10x10\" 10x10\" 10x10\" 10x10\"   Wrist flexion stretch elbow straight 10x10\" 10x10\" 10x10\" 10x10\" 10x10\"   Wrist flexion elbow straight with fingers 10x10\" 10x10\" 10x10\" 10x10\" 10x10\"                           UBE        Neuro Re-Ed        Wrist extension painful 2x10 2# 1x10, 1x10x3\" 2# 2x10 AROM hold and slow eccentric 1x10 red " "flexbar   Wrist flexion painful 2x10 2# 1x10, 1x10x3\" 2#  1x10 red flexbar   Supination painful       Pronation painful       Finger extension        Thumb AB/AD        Thumb flex/ext             2x10 green   Thumb adduction        Radial deviation  1x10 2#   2x10 AROM   Hammer curl   2x10 2# 2x10 3# 2x10 4#            Re-evaluation         Ther Act         Education                 Modalities        EPAT  Wrist extensors                                                                            "

## 2024-05-08 ENCOUNTER — APPOINTMENT (OUTPATIENT)
Dept: PHYSICAL THERAPY | Facility: CLINIC | Age: 64
End: 2024-05-08
Payer: MEDICARE

## 2024-05-13 DIAGNOSIS — E66.9 TYPE 2 DIABETES MELLITUS WITH OBESITY  (HCC): ICD-10-CM

## 2024-05-13 DIAGNOSIS — E11.69 TYPE 2 DIABETES MELLITUS WITH OBESITY  (HCC): ICD-10-CM

## 2024-05-14 ENCOUNTER — OFFICE VISIT (OUTPATIENT)
Dept: PHYSICAL THERAPY | Facility: CLINIC | Age: 64
End: 2024-05-14
Payer: MEDICARE

## 2024-05-14 DIAGNOSIS — M25.431 SWELLING OF RIGHT WRIST: ICD-10-CM

## 2024-05-14 DIAGNOSIS — M25.531 RIGHT WRIST PAIN: Primary | ICD-10-CM

## 2024-05-14 PROCEDURE — 97110 THERAPEUTIC EXERCISES: CPT

## 2024-05-14 PROCEDURE — 97140 MANUAL THERAPY 1/> REGIONS: CPT

## 2024-05-14 PROCEDURE — 97112 NEUROMUSCULAR REEDUCATION: CPT

## 2024-05-14 NOTE — PROGRESS NOTES
"Daily Note     Today's date: 2024  Patient name: Kristy Brown  : 1960  MRN: [id number]  Referring provider: Kristy Singh, *  Dx:   Encounter Diagnosis     ICD-10-CM    1. Right wrist pain  M25.531       2. Swelling of right wrist  M25.431           Start Time: 1215  Stop Time: 1300  Total time in clinic (min): 45 minutes    Subjective: Pt reports that she hasn't had too much pain since the last time that she was in treatment.  She reports that she had even been planting and digging without much of an issue.       Objective: See treatment diary below      Assessment: Tolerated treatment well. Began treatment today with manual interventions.  Pt presents with decreased trigger points today, those remaining primarily located in distal musculature.  Moved onto stretching with good tolerance noted.  Pt continues with challenge during gripping and turning of doorknobs.  Worked on  and supination activities next with good tolerance. Continued strengthening with pt able to perform different activities today and  weights without increase in pain.  Patient demonstrated fatigue post treatment, exhibited good technique with therapeutic exercises, and would benefit from continued PT      Plan: Continue per plan of care.       POC Expires Auth Status Start Date Expiration Date PT Visit Limit     N/A   As needed   Date 4/19 5/1 5/3 5/14    Used 13 14    Remaining          Diagnosis: R wrist   Precautions:    Primary Goals:  strength, wrist endurance, flexibility   *asterisks by exercise = given for HEP   Manuals 5/14  4/26 5/1 5/3   IASTM AK    AK   PROM        Distraction mobs        Taping                There Ex        Wrist flexion stretch elbow bent 10x10\"  10x10\" 10x10\" 10x10\"   Wrist flexion stretch elbow bent 10x10\"  10x10\" 10x10\" 10x10\"   Wrist flexion stretch elbow straight 10x10\"  10x10\" 10x10\" 10x10\"   Wrist flexion stretch elbow straight 10x10\"  10x10\" 10x10\" 10x10\" " "  Wrist flexion elbow straight with fingers 10x10\"  10x10\" 10x10\" 10x10\"                           UBE        Neuro Re-Ed        Wrist extension 2x10 2#  1x10, 1x10x3\" 2# 2x10 AROM hold and slow eccentric 1x10 red flexbar   Wrist flexion 2x10 2#  1x10, 1x10x3\" 2#  1x10 red flexbar   Supination 2x10 red flexbar       Pronation        Finger extension        Thumb AB/AD        Thumb flex/ext         2x10 x5\"green    2x10 green   Thumb adduction        Radial deviation     2x10 AROM   Hammer curl 2x10 4#  2x10 2# 2x10 3# 2x10 4#            Re-evaluation         Ther Act         Education                 Modalities        EPAT  Wrist extensors                                                                              "

## 2024-05-17 ENCOUNTER — APPOINTMENT (OUTPATIENT)
Dept: LAB | Facility: CLINIC | Age: 64
End: 2024-05-17
Payer: MEDICARE

## 2024-05-17 DIAGNOSIS — E11.69 TYPE 2 DIABETES MELLITUS WITH OBESITY  (HCC): ICD-10-CM

## 2024-05-17 DIAGNOSIS — E66.9 TYPE 2 DIABETES MELLITUS WITH OBESITY  (HCC): ICD-10-CM

## 2024-05-17 LAB
ALBUMIN SERPL BCP-MCNC: 4.3 G/DL (ref 3.5–5)
ALP SERPL-CCNC: 76 U/L (ref 34–104)
ALT SERPL W P-5'-P-CCNC: 20 U/L (ref 7–52)
ANION GAP SERPL CALCULATED.3IONS-SCNC: 10 MMOL/L (ref 4–13)
AST SERPL W P-5'-P-CCNC: 24 U/L (ref 13–39)
BILIRUB SERPL-MCNC: 0.83 MG/DL (ref 0.2–1)
BUN SERPL-MCNC: 7 MG/DL (ref 5–25)
CALCIUM SERPL-MCNC: 9.5 MG/DL (ref 8.4–10.2)
CHLORIDE SERPL-SCNC: 103 MMOL/L (ref 96–108)
CO2 SERPL-SCNC: 27 MMOL/L (ref 21–32)
CREAT SERPL-MCNC: 0.59 MG/DL (ref 0.6–1.3)
GFR SERPL CREATININE-BSD FRML MDRD: 96 ML/MIN/1.73SQ M
GLUCOSE P FAST SERPL-MCNC: 117 MG/DL (ref 65–99)
POTASSIUM SERPL-SCNC: 4.2 MMOL/L (ref 3.5–5.3)
PROT SERPL-MCNC: 6.9 G/DL (ref 6.4–8.4)
SODIUM SERPL-SCNC: 140 MMOL/L (ref 135–147)

## 2024-05-17 PROCEDURE — 36415 COLL VENOUS BLD VENIPUNCTURE: CPT

## 2024-05-17 PROCEDURE — 80053 COMPREHEN METABOLIC PANEL: CPT

## 2024-05-20 DIAGNOSIS — F41.9 ANXIETY: ICD-10-CM

## 2024-05-20 RX ORDER — ALPRAZOLAM 0.25 MG/1
0.25 TABLET ORAL
Qty: 30 TABLET | Refills: 0 | Status: SHIPPED | OUTPATIENT
Start: 2024-05-20

## 2024-05-24 ENCOUNTER — OFFICE VISIT (OUTPATIENT)
Dept: PHYSICAL THERAPY | Facility: CLINIC | Age: 64
End: 2024-05-24
Payer: MEDICARE

## 2024-05-24 DIAGNOSIS — M25.431 SWELLING OF RIGHT WRIST: ICD-10-CM

## 2024-05-24 DIAGNOSIS — M25.531 RIGHT WRIST PAIN: Primary | ICD-10-CM

## 2024-05-24 PROCEDURE — 97110 THERAPEUTIC EXERCISES: CPT

## 2024-05-24 PROCEDURE — 97140 MANUAL THERAPY 1/> REGIONS: CPT

## 2024-05-24 NOTE — PROGRESS NOTES
"Discharge     Today's date: 2024  Patient name: Kristy Brown  : 1960  MRN: [id number]  Referring provider: Kristy Singh, *  Dx:   Encounter Diagnosis     ICD-10-CM    1. Right wrist pain  M25.531       2. Swelling of right wrist  M25.431           Start Time: 1345  Stop Time: 1425  Total time in clinic (min): 40 minutes    Subjective: Pt reports that she hasn't had pain in the last few weeks, just a little soreness after increased activities.        Objective: See treatment diary below      Assessment: Jenn has been compliant with attending physical therapy and completing home exercise program since initial evaluation.  Jenn has made improvements in objective data since initial evaluation and has achieved all goals.  Patient reports having returned to their prior level of function. Patient provided with updated Home Exercise Program, all questions answered, and verbalized understanding, agreeing to plan of care. Thus it was mutually decided to discontinue this episode of care and transition to Home Exercise Program.         Plan: Discharge      POC Expires Auth Status Start Date Expiration Date PT Visit Limit     N/A   As needed   Date 4/19 5/1 5/3 5/14 5/24   Used 11 12 13 14 15   Remaining          Diagnosis: R wrist   Precautions:    Primary Goals:  strength, wrist endurance, flexibility   *asterisks by exercise = given for HEP   Manuals 5/14 5/24  5/1 5/3   IASTM AK AK   AK   PROM        Distraction mobs        Taping                There Ex        Wrist flexion stretch elbow bent 10x10\" 10x10\"  10x10\" 10x10\"   Wrist flexion stretch elbow bent 10x10\" 10x10\"  10x10\" 10x10\"   Wrist flexion stretch elbow straight 10x10\" 10x10\"  10x10\" 10x10\"   Wrist flexion stretch elbow straight 10x10\" 10x10\"  10x10\" 10x10\"   Wrist flexion elbow straight with fingers 10x10\" 10x10\"  10x10\" 10x10\"                           UBE        Neuro Re-Ed        Wrist extension 2x10 2#   2x10 AROM hold " "and slow eccentric 1x10 red flexbar   Wrist flexion 2x10 2#    1x10 red flexbar   Supination 2x10 red flexbar       Pronation        Finger extension        Thumb AB/AD        Thumb flex/ext         2x10 x5\"green    2x10 green   Thumb adduction        Radial deviation     2x10 AROM   Hammer curl 2x10 4#   2x10 3# 2x10 4#            Re-evaluation         Ther Act         Education  Continuing stretching and exercising at home,                Modalities        EPAT  Wrist extensors                                                                                "

## 2024-05-30 ENCOUNTER — APPOINTMENT (OUTPATIENT)
Dept: PHYSICAL THERAPY | Facility: CLINIC | Age: 64
End: 2024-05-30
Payer: MEDICARE

## 2024-05-30 ENCOUNTER — PATIENT MESSAGE (OUTPATIENT)
Dept: FAMILY MEDICINE CLINIC | Facility: CLINIC | Age: 64
End: 2024-05-30

## 2024-05-31 ENCOUNTER — APPOINTMENT (OUTPATIENT)
Dept: PHYSICAL THERAPY | Facility: CLINIC | Age: 64
End: 2024-05-31
Payer: MEDICARE

## 2024-05-31 DIAGNOSIS — E66.9 TYPE 2 DIABETES MELLITUS WITH OBESITY  (HCC): ICD-10-CM

## 2024-05-31 DIAGNOSIS — E11.69 TYPE 2 DIABETES MELLITUS WITH OBESITY  (HCC): ICD-10-CM

## 2024-06-03 NOTE — TELEPHONE ENCOUNTER
Patient called the RX Refill Line. Message is being forwarded to the office.     Patient called to check on refill of Ozempic 0.25 or 0.5 mg. Out of medication and was suppose to take it this morning.     Please contact patient at [phone]

## 2024-06-26 DIAGNOSIS — E11.69 TYPE 2 DIABETES MELLITUS WITH OBESITY  (HCC): ICD-10-CM

## 2024-06-26 DIAGNOSIS — E66.9 TYPE 2 DIABETES MELLITUS WITH OBESITY  (HCC): ICD-10-CM

## 2024-07-12 ENCOUNTER — HOSPITAL ENCOUNTER (OUTPATIENT)
Dept: ULTRASOUND IMAGING | Facility: HOSPITAL | Age: 64
End: 2024-07-12
Attending: INTERNAL MEDICINE
Payer: MEDICARE

## 2024-07-12 DIAGNOSIS — K74.69 OTHER CIRRHOSIS OF LIVER (HCC): ICD-10-CM

## 2024-07-12 PROCEDURE — 76705 ECHO EXAM OF ABDOMEN: CPT

## 2024-07-23 ENCOUNTER — APPOINTMENT (OUTPATIENT)
Dept: LAB | Facility: CLINIC | Age: 64
End: 2024-07-23
Payer: MEDICARE

## 2024-07-23 DIAGNOSIS — E11.69 TYPE 2 DIABETES MELLITUS WITH OBESITY  (HCC): ICD-10-CM

## 2024-07-23 DIAGNOSIS — I10 ESSENTIAL HYPERTENSION: ICD-10-CM

## 2024-07-23 DIAGNOSIS — E66.9 TYPE 2 DIABETES MELLITUS WITH OBESITY  (HCC): ICD-10-CM

## 2024-07-23 DIAGNOSIS — R80.9 MICROALBUMINURIA: ICD-10-CM

## 2024-07-23 LAB
ALBUMIN SERPL BCG-MCNC: 4 G/DL (ref 3.5–5)
ALP SERPL-CCNC: 73 U/L (ref 34–104)
ALT SERPL W P-5'-P-CCNC: 21 U/L (ref 7–52)
ANION GAP SERPL CALCULATED.3IONS-SCNC: 9 MMOL/L (ref 4–13)
AST SERPL W P-5'-P-CCNC: 24 U/L (ref 13–39)
BILIRUB SERPL-MCNC: 0.6 MG/DL (ref 0.2–1)
BUN SERPL-MCNC: 9 MG/DL (ref 5–25)
CALCIUM SERPL-MCNC: 9.3 MG/DL (ref 8.4–10.2)
CHLORIDE SERPL-SCNC: 104 MMOL/L (ref 96–108)
CHOLEST SERPL-MCNC: 124 MG/DL
CO2 SERPL-SCNC: 28 MMOL/L (ref 21–32)
CREAT SERPL-MCNC: 0.53 MG/DL (ref 0.6–1.3)
CREAT UR-MCNC: 192 MG/DL
CREAT UR-MCNC: 192 MG/DL
EST. AVERAGE GLUCOSE BLD GHB EST-MCNC: 126 MG/DL
GFR SERPL CREATININE-BSD FRML MDRD: 99 ML/MIN/1.73SQ M
GLUCOSE P FAST SERPL-MCNC: 108 MG/DL (ref 65–99)
HBA1C MFR BLD: 6 %
HDLC SERPL-MCNC: 39 MG/DL
LDLC SERPL CALC-MCNC: 64 MG/DL (ref 0–100)
MICROALBUMIN UR-MCNC: 8.1 MG/L
MICROALBUMIN/CREAT 24H UR: 4 MG/G CREATININE (ref 0–30)
PHOSPHATE SERPL-MCNC: 3.3 MG/DL (ref 2.3–4.1)
POTASSIUM SERPL-SCNC: 4 MMOL/L (ref 3.5–5.3)
PROT SERPL-MCNC: 7 G/DL (ref 6.4–8.4)
PROT UR-MCNC: 12 MG/DL
PROT/CREAT UR: 0.06 MG/G{CREAT} (ref 0–0.1)
SODIUM SERPL-SCNC: 141 MMOL/L (ref 135–147)
TRIGL SERPL-MCNC: 107 MG/DL

## 2024-07-23 PROCEDURE — 82043 UR ALBUMIN QUANTITATIVE: CPT

## 2024-07-23 PROCEDURE — 80053 COMPREHEN METABOLIC PANEL: CPT

## 2024-07-23 PROCEDURE — 84100 ASSAY OF PHOSPHORUS: CPT

## 2024-07-23 PROCEDURE — 83036 HEMOGLOBIN GLYCOSYLATED A1C: CPT

## 2024-07-23 PROCEDURE — 82570 ASSAY OF URINE CREATININE: CPT

## 2024-07-23 PROCEDURE — 80061 LIPID PANEL: CPT

## 2024-07-23 PROCEDURE — 36415 COLL VENOUS BLD VENIPUNCTURE: CPT

## 2024-07-23 PROCEDURE — 84156 ASSAY OF PROTEIN URINE: CPT

## 2024-07-24 ENCOUNTER — ANESTHESIA (OUTPATIENT)
Dept: GASTROENTEROLOGY | Facility: HOSPITAL | Age: 64
End: 2024-07-24

## 2024-07-24 ENCOUNTER — HOSPITAL ENCOUNTER (OUTPATIENT)
Dept: GASTROENTEROLOGY | Facility: HOSPITAL | Age: 64
Setting detail: OUTPATIENT SURGERY
Discharge: HOME | End: 2024-07-24
Attending: INTERNAL MEDICINE
Payer: MEDICARE

## 2024-07-24 ENCOUNTER — TELEPHONE (OUTPATIENT)
Dept: NEPHROLOGY | Facility: CLINIC | Age: 64
End: 2024-07-24

## 2024-07-24 ENCOUNTER — ANESTHESIA EVENT (OUTPATIENT)
Dept: GASTROENTEROLOGY | Facility: HOSPITAL | Age: 64
End: 2024-07-24

## 2024-07-24 VITALS
DIASTOLIC BLOOD PRESSURE: 58 MMHG | HEART RATE: 67 BPM | SYSTOLIC BLOOD PRESSURE: 103 MMHG | OXYGEN SATURATION: 95 % | TEMPERATURE: 97.4 F | RESPIRATION RATE: 15 BRPM

## 2024-07-24 DIAGNOSIS — K74.69 OTHER CIRRHOSIS OF LIVER (HCC): ICD-10-CM

## 2024-07-24 LAB — GLUCOSE SERPL-MCNC: 128 MG/DL (ref 65–140)

## 2024-07-24 PROCEDURE — 43235 EGD DIAGNOSTIC BRUSH WASH: CPT | Performed by: INTERNAL MEDICINE

## 2024-07-24 PROCEDURE — 82948 REAGENT STRIP/BLOOD GLUCOSE: CPT

## 2024-07-24 RX ORDER — PROPOFOL 10 MG/ML
INJECTION, EMULSION INTRAVENOUS AS NEEDED
Status: DISCONTINUED | OUTPATIENT
Start: 2024-07-24 | End: 2024-07-24

## 2024-07-24 RX ORDER — LIDOCAINE HYDROCHLORIDE 20 MG/ML
INJECTION, SOLUTION EPIDURAL; INFILTRATION; INTRACAUDAL; PERINEURAL AS NEEDED
Status: DISCONTINUED | OUTPATIENT
Start: 2024-07-24 | End: 2024-07-24

## 2024-07-24 RX ORDER — SODIUM CHLORIDE, SODIUM LACTATE, POTASSIUM CHLORIDE, CALCIUM CHLORIDE 600; 310; 30; 20 MG/100ML; MG/100ML; MG/100ML; MG/100ML
INJECTION, SOLUTION INTRAVENOUS CONTINUOUS PRN
Status: DISCONTINUED | OUTPATIENT
Start: 2024-07-24 | End: 2024-07-24

## 2024-07-24 RX ADMIN — LIDOCAINE HYDROCHLORIDE 100 MG: 20 INJECTION, SOLUTION EPIDURAL; INFILTRATION; INTRACAUDAL; PERINEURAL at 08:37

## 2024-07-24 RX ADMIN — SODIUM CHLORIDE, SODIUM LACTATE, POTASSIUM CHLORIDE, AND CALCIUM CHLORIDE: .6; .31; .03; .02 INJECTION, SOLUTION INTRAVENOUS at 08:27

## 2024-07-24 RX ADMIN — PROPOFOL 150 MG: 10 INJECTION, EMULSION INTRAVENOUS at 08:37

## 2024-07-24 RX ADMIN — PROPOFOL 20 MG: 10 INJECTION, EMULSION INTRAVENOUS at 08:39

## 2024-07-24 NOTE — H&P
History and Physical -  [facility]  Kristy Brown 63 y.o. female MRN: [id number]                  HPI: Kristy Brown is a 63 y.o. year old female who presents for cirrhosis, surveillance for varices      REVIEW OF SYSTEMS: Per the HPI, and otherwise unremarkable.    Historical Information   Past Medical History:   Diagnosis Date    Cirrhosis (HCC)     Diabetes mellitus (HCC)     Ear problems     GERD (gastroesophageal reflux disease)     HL (hearing loss)     Hypertension     Seasonal allergies     Thrombocytopenia, unspecified (HCC)     Per CMS ICD 10 Guidelines--Per Phyisician    Tinnitus      Past Surgical History:   Procedure Laterality Date    ACHILLES TENDON REPAIR Right     COLONOSCOPY      HYSTERECTOMY      age 40    TYMPANOSTOMY TUBE PLACEMENT Right     UPPER GASTROINTESTINAL ENDOSCOPY       Social History   Social History     Substance and Sexual Activity   Alcohol Use No    Comment: Sober for 20 years     Social History     Substance and Sexual Activity   Drug Use No     Social History     Tobacco Use   Smoking Status Every Day    Current packs/day: 0.25    Average packs/day: 0.3 packs/day for 40.0 years (10.0 ttl pk-yrs)    Types: Cigarettes   Smokeless Tobacco Never     Family History   Problem Relation Age of Onset    Lung cancer Sister     Cancer Sister     Hypertension Mother     No Known Problems Father     No Known Problems Maternal Grandmother     No Known Problems Maternal Grandfather     No Known Problems Paternal Grandmother     No Known Problems Paternal Grandfather     No Known Problems Brother     No Known Problems Brother        Meds/Allergies       Current Outpatient Medications:     ALPRAZolam (XANAX) 0.25 mg tablet    atorvastatin (LIPITOR) 20 mg tablet    carvedilol (COREG) 12.5 mg tablet    lisinopril-hydrochlorothiazide (PRINZIDE,ZESTORETIC) 10-12.5 MG per tablet    metFORMIN (GLUCOPHAGE) 500 mg tablet    Accu-Chek Guide test strip    cetirizine (ZyrTEC)  10 mg tablet    Diclofenac Sodium (VOLTAREN) 1 %    ibuprofen (MOTRIN) 800 mg tablet    Multiple Vitamin (multivitamin) capsule    semaglutide, 0.25 or 0.5 mg/dose, (Ozempic, 0.25 or 0.5 MG/DOSE,) 2 mg/3 mL injection pen    Allergies   Allergen Reactions    Penicillins        Objective     There were no vitals taken for this visit.      PHYSICAL EXAM    Gen: NAD  Head: NCAT  CV: RRR  CHEST: Clear  ABD: soft, NT/ND  EXT: no edema      ASSESSMENT/PLAN:  This is a 63 y.o. year old female here for EGD, and she is stable and optimized for her procedure.

## 2024-07-24 NOTE — ANESTHESIA PREPROCEDURE EVALUATION
Procedure:  EGD    Relevant Problems   CARDIO   (+) Essential hypertension   (+) Mixed hyperlipidemia      ENDO   (+) Type 2 diabetes mellitus with obesity  (HCC)      GI/HEPATIC   (+) Gastroesophageal reflux disease with esophagitis without hemorrhage   (+) Other cirrhosis of liver (HCC)      MUSCULOSKELETAL   (+) Arthritis of carpometacarpal (CMC) joint of right thumb   (+) Sciatica of left side      NEURO/PSYCH   (+) Anxiety      PULMONARY   (+) Smoking        Physical Exam    Airway    Mallampati score: II  TM Distance: >3 FB  Neck ROM: full     Dental       Cardiovascular      Pulmonary      Other Findings  post-pubertal.      Anesthesia Plan  ASA Score- 3     Anesthesia Type- IV sedation with anesthesia with ASA Monitors.         Additional Monitors:     Airway Plan:            Plan Factors-Exercise tolerance (METS): >4 METS.    Chart reviewed.        Patient is not a current smoker.  Patient did not smoke on day of surgery.    Obstructive sleep apnea risk education given perioperatively.        Induction- intravenous.    Postoperative Plan-     Perioperative Resuscitation Plan - Level 1 - Full Code.       Informed Consent- Anesthetic plan and risks discussed with patient.  I personally reviewed this patient with the CRNA. Discussed and agreed on the Anesthesia Plan with the CRNA..      NPO appropriate. Discussed benefits/risks of monitored anesthetic care and discussed providing a dynamic level of mild to deep sedation. Risks include awareness, airway obstruction, aspiration which may necessitate conversion to general anesthesia. All questions answered. Patient understands and wishes to proceed.    Anesthesia plan and consent discussed with Jenn who expressed understanding and agreement. Risks/benefits and alternatives discussed with patient including possible PONV, sore throat, damage to teeth/lips/gums and possibility of rare anesthetic and surgical emergencies.

## 2024-07-24 NOTE — ANESTHESIA POSTPROCEDURE EVALUATION
Post-Op Assessment Note    CV Status:  Stable  Pain Score: 0    Pain management: adequate       Mental Status:  Sleepy and arousable   Hydration Status:  Euvolemic   PONV Controlled:  Controlled   Airway Patency:  Patent     Post Op Vitals Reviewed: Yes    No anethesia notable event occurred.    Staff: Anesthesiologist, CRNA               /58 (07/24/24 0905)    Temp (!) 97.4 °F (36.3 °C) (07/24/24 0905)    Pulse 67 (07/24/24 0905)   Resp 15 (07/24/24 0905)    SpO2 95 % (07/24/24 0905)

## 2024-07-24 NOTE — TELEPHONE ENCOUNTER
----- Message from Jean Paul Chahal MD sent at 7/23/2024  4:50 PM EDT -----  Please let the patient know that kidney function is looking good and there is no protein in the urine on this assessment which is good and reassuring.  Thank you.

## 2024-07-29 ENCOUNTER — PATIENT MESSAGE (OUTPATIENT)
Dept: FAMILY MEDICINE CLINIC | Facility: CLINIC | Age: 64
End: 2024-07-29

## 2024-07-30 ENCOUNTER — RA CDI HCC (OUTPATIENT)
Dept: OTHER | Facility: HOSPITAL | Age: 64
End: 2024-07-30

## 2024-07-30 PROBLEM — Z23 ENCOUNTER FOR IMMUNIZATION: Status: RESOLVED | Noted: 2024-01-11 | Resolved: 2024-07-30

## 2024-08-05 ENCOUNTER — OFFICE VISIT (OUTPATIENT)
Dept: FAMILY MEDICINE CLINIC | Facility: CLINIC | Age: 64
End: 2024-08-05
Payer: MEDICARE

## 2024-08-05 VITALS
SYSTOLIC BLOOD PRESSURE: 118 MMHG | HEIGHT: 64 IN | BODY MASS INDEX: 42.68 KG/M2 | WEIGHT: 250 LBS | DIASTOLIC BLOOD PRESSURE: 78 MMHG | OXYGEN SATURATION: 98 % | HEART RATE: 77 BPM

## 2024-08-05 DIAGNOSIS — Z78.0 MENOPAUSE: ICD-10-CM

## 2024-08-05 DIAGNOSIS — Z12.31 SCREENING MAMMOGRAM FOR BREAST CANCER: ICD-10-CM

## 2024-08-05 DIAGNOSIS — F41.9 ANXIETY: ICD-10-CM

## 2024-08-05 DIAGNOSIS — I10 ESSENTIAL HYPERTENSION: ICD-10-CM

## 2024-08-05 DIAGNOSIS — E78.2 MIXED HYPERLIPIDEMIA: ICD-10-CM

## 2024-08-05 DIAGNOSIS — E66.9 TYPE 2 DIABETES MELLITUS WITH OBESITY  (HCC): Primary | ICD-10-CM

## 2024-08-05 DIAGNOSIS — E11.69 TYPE 2 DIABETES MELLITUS WITH OBESITY  (HCC): Primary | ICD-10-CM

## 2024-08-05 PROCEDURE — 99215 OFFICE O/P EST HI 40 MIN: CPT | Performed by: FAMILY MEDICINE

## 2024-08-05 PROCEDURE — G2211 COMPLEX E/M VISIT ADD ON: HCPCS | Performed by: FAMILY MEDICINE

## 2024-08-05 RX ORDER — ALPRAZOLAM 0.25 MG/1
0.25 TABLET ORAL
Qty: 30 TABLET | Refills: 0 | Status: SHIPPED | OUTPATIENT
Start: 2024-08-05

## 2024-08-05 RX ORDER — LISINOPRIL AND HYDROCHLOROTHIAZIDE 12.5; 1 MG/1; MG/1
1 TABLET ORAL DAILY
Qty: 90 TABLET | Refills: 1 | Status: SHIPPED | OUTPATIENT
Start: 2024-08-05

## 2024-08-05 RX ORDER — ATORVASTATIN CALCIUM 20 MG/1
20 TABLET, FILM COATED ORAL DAILY
Qty: 90 TABLET | Refills: 1 | Status: SHIPPED | OUTPATIENT
Start: 2024-08-05

## 2024-08-05 NOTE — ASSESSMENT & PLAN NOTE
Stable on Xanax, Patient uses the medication infrequently.  PDMP reviewed, medication refill called in.

## 2024-08-05 NOTE — ASSESSMENT & PLAN NOTE
Lab Results   Component Value Date    HGBA1C 6.0 (H) 07/23/2024     A1c improved significantly.  Patient is on Ozempic 0.5 mg, metformin 500 mg twice daily.  Continue same.  Continue with low-carb diet.  Monitoring with metabolic labs at 4-month interval.

## 2024-08-05 NOTE — PROGRESS NOTES
Subjective:      Patient ID: Kristy Brown is a 63 y.o. female.    HPI    Patient is following up on her chronic medical problems.  Has history of type 2 diabetes, hypertension, dyslipidemia.  Patient is on Ozempic 0.5 mg, metformin 500 mg twice daily.  Patient started Ozempic in May.  Initially had some GI side effects however these have resolved now.  Her A1c improved significantly since she has been on Ozempic.  Patient reports significant improvement in her cravings.  Blood pressure is at goal.  Metabolic labs reviewed with patient, LDL is at goal, on atorvastatin 20 mg.  Patient denies any symptoms of chest pain or shortness of breath.  Follows up with gastroenterology for management of liver cirrhosis.  On beta-blocker per GI.  Patient has symptoms of intermittent anxiety especially worse with travel.  Patient takes Xanax 0.25 mg daily as needed only.    Past Medical History:   Diagnosis Date    Cirrhosis (HCC)     Diabetes mellitus (HCC)     Ear problems     Encounter for immunization 01/11/2024    GERD (gastroesophageal reflux disease)     HL (hearing loss)     Hypertension     Seasonal allergies     Thrombocytopenia, unspecified (HCC)     Per CMS ICD 10 Guidelines--Per Phyisician    Tinnitus        Family History   Problem Relation Age of Onset    Lung cancer Sister     Cancer Sister     Hypertension Mother     No Known Problems Father     No Known Problems Maternal Grandmother     No Known Problems Maternal Grandfather     No Known Problems Paternal Grandmother     No Known Problems Paternal Grandfather     No Known Problems Brother     No Known Problems Brother        Past Surgical History:   Procedure Laterality Date    ACHILLES TENDON REPAIR Right     COLONOSCOPY      HYSTERECTOMY      age 40    TYMPANOSTOMY TUBE PLACEMENT Right     UPPER GASTROINTESTINAL ENDOSCOPY          reports that she has been smoking cigarettes. She has a 10 pack-year smoking history. She has never used smokeless tobacco.  She reports that she does not drink alcohol and does not use drugs.      Current Outpatient Medications:     ALPRAZolam (XANAX) 0.25 mg tablet, Take 1 tablet (0.25 mg total) by mouth daily at bedtime as needed for anxiety, Disp: 30 tablet, Rfl: 0    atorvastatin (LIPITOR) 20 mg tablet, Take 1 tablet (20 mg total) by mouth daily, Disp: 90 tablet, Rfl: 1    carvedilol (COREG) 12.5 mg tablet, TAKE ONE TABLET BY MOUTH TWICE A DAY WITH MEALS, Disp: 60 tablet, Rfl: 5    cetirizine (ZyrTEC) 10 mg tablet, Take 10 mg by mouth daily at bedtime, Disp: , Rfl:     Diclofenac Sodium (VOLTAREN) 1 %, Apply 2 g topically 4 (four) times a day (Patient taking differently: Apply 2 g topically 4 (four) times a day Uses prn), Disp: 100 g, Rfl: 2    lisinopril-hydrochlorothiazide (PRINZIDE,ZESTORETIC) 10-12.5 MG per tablet, Take 1 tablet by mouth daily, Disp: 90 tablet, Rfl: 1    metFORMIN (GLUCOPHAGE) 500 mg tablet, Take 1 tablet (500 mg total) by mouth 2 (two) times a day with meals, Disp: 180 tablet, Rfl: 1    Multiple Vitamin (multivitamin) capsule, Take 1 capsule by mouth daily, Disp: , Rfl:     semaglutide, 0.25 or 0.5 mg/dose, (Ozempic, 0.25 or 0.5 MG/DOSE,) 2 mg/3 mL injection pen, Inject 0.75 mL (0.5 mg total) under the skin every 7 days, Disp: 3 mL, Rfl: 3    Accu-Chek Guide test strip, USE TO CHECK BLOOD SUGAR ONCE DAILY AS DIRECTED (Patient not taking: Reported on 8/21/2023), Disp: 100 strip, Rfl: 1    ibuprofen (MOTRIN) 800 mg tablet, Take 1 tablet (800 mg total) by mouth every 8 (eight) hours as needed for mild pain (Patient not taking: Reported on 7/10/2024), Disp: 30 tablet, Rfl: 0    The following portions of the patient's history were reviewed and updated as appropriate: allergies, current medications, past family history, past medical history, past social history, past surgical history and problem list.    Review of Systems   Constitutional: Negative.    Respiratory: Negative.     Cardiovascular: Negative.       "      Objective:    /78   Pulse 77   Ht 5' 4\" (1.626 m)   Wt 113 kg (250 lb)   SpO2 98%   BMI 42.91 kg/m²      Physical Exam  Constitutional:       Appearance: Normal appearance.   Cardiovascular:      Heart sounds: Normal heart sounds.   Pulmonary:      Breath sounds: Normal breath sounds.           Recent Results (from the past 1008 hour(s))   Protein / creatinine ratio, urine    Collection Time: 07/23/24  9:01 AM   Result Value Ref Range    Creatinine, Ur 192.0 Reference range not established. mg/dL    Protein Urine Random 12 Reference range not established. mg/dL    Prot/Creat Ratio, Ur 0.06 0.00 - 0.10   Albumin / creatinine urine ratio    Collection Time: 07/23/24  9:01 AM   Result Value Ref Range    Creatinine, Ur 192.0 Reference range not established. mg/dL    Albumin,U,Random 8.1 <20.0 mg/L    Albumin Creat Ratio 4 0 - 30 mg/g creatinine   Comprehensive metabolic panel    Collection Time: 07/23/24  9:01 AM   Result Value Ref Range    Sodium 141 135 - 147 mmol/L    Potassium 4.0 3.5 - 5.3 mmol/L    Chloride 104 96 - 108 mmol/L    CO2 28 21 - 32 mmol/L    ANION GAP 9 4 - 13 mmol/L    BUN 9 5 - 25 mg/dL    Creatinine 0.53 (L) 0.60 - 1.30 mg/dL    Glucose, Fasting 108 (H) 65 - 99 mg/dL    Calcium 9.3 8.4 - 10.2 mg/dL    AST 24 13 - 39 U/L    ALT 21 7 - 52 U/L    Alkaline Phosphatase 73 34 - 104 U/L    Total Protein 7.0 6.4 - 8.4 g/dL    Albumin 4.0 3.5 - 5.0 g/dL    Total Bilirubin 0.60 0.20 - 1.00 mg/dL    eGFR 99 ml/min/1.73sq m   Lipid Panel with Direct LDL reflex    Collection Time: 07/23/24  9:01 AM   Result Value Ref Range    Cholesterol 124 See Comment mg/dL    Triglycerides 107 See Comment mg/dL    HDL, Direct 39 (L) >=50 mg/dL    LDL Calculated 64 0 - 100 mg/dL   Hemoglobin A1C    Collection Time: 07/23/24  9:01 AM   Result Value Ref Range    Hemoglobin A1C 6.0 (H) Normal 4.0-5.6%; PreDiabetic 5.7-6.4%; Diabetic >=6.5%; Glycemic control for adults with diabetes <7.0% %     mg/dl "   Phosphorus    Collection Time: 07/23/24  9:01 AM   Result Value Ref Range    Phosphorus 3.3 2.3 - 4.1 mg/dL   Fingerstick Glucose (POCT)    Collection Time: 07/24/24  7:39 AM   Result Value Ref Range    POC Glucose 128 65 - 140 mg/dl       Assessment/Plan:    No problem-specific Assessment & Plan notes found for this encounter.           Problem List Items Addressed This Visit          Cardiovascular and Mediastinum    Essential hypertension     Patient's blood pressure is at goal.  Continue lisinopril hydrochlorothiazide 10/12.5 milligram         Relevant Medications    lisinopril-hydrochlorothiazide (PRINZIDE,ZESTORETIC) 10-12.5 MG per tablet       Endocrine    Type 2 diabetes mellitus with obesity  (HCC) - Primary       Lab Results   Component Value Date    HGBA1C 6.0 (H) 07/23/2024     A1c improved significantly.  Patient is on Ozempic 0.5 mg, metformin 500 mg twice daily.  Continue same.  Continue with low-carb diet.  Monitoring with metabolic labs at 4-month interval.         Relevant Medications    metFORMIN (GLUCOPHAGE) 500 mg tablet    semaglutide, 0.25 or 0.5 mg/dose, (Ozempic, 0.25 or 0.5 MG/DOSE,) 2 mg/3 mL injection pen    Other Relevant Orders    Hemoglobin A1C    Comprehensive metabolic panel    Albumin / creatinine urine ratio       Behavioral Health    Anxiety      Stable on Xanax, Patient uses the medication infrequently.  PDMP reviewed, medication refill called in.         Relevant Medications    ALPRAZolam (XANAX) 0.25 mg tablet       Other    Mixed hyperlipidemia     LDL is at goal.  Continue atorvastatin 20 milligrams         Relevant Medications    atorvastatin (LIPITOR) 20 mg tablet    Other Relevant Orders    Lipid Panel with Direct LDL reflex     Other Visit Diagnoses       Screening mammogram for breast cancer        Relevant Orders    Mammo screening bilateral w 3d & cad    Menopause        Relevant Orders    DXA body comp analysis            I have spent a total time of 40 minutes in  caring for this patient on the day of the visit/encounter including Diagnostic results, Prognosis, Risks and benefits of tx options, Instructions for management, Patient and family education, Importance of tx compliance, Risk factor reductions, Impressions, Counseling / Coordination of care, Documenting in the medical record, Reviewing / ordering tests, medicine, procedures  , and Obtaining or reviewing history  .

## 2024-08-13 ENCOUNTER — OFFICE VISIT (OUTPATIENT)
Dept: NEPHROLOGY | Facility: CLINIC | Age: 64
End: 2024-08-13

## 2024-08-13 VITALS
DIASTOLIC BLOOD PRESSURE: 69 MMHG | HEART RATE: 71 BPM | BODY MASS INDEX: 42.68 KG/M2 | WEIGHT: 250 LBS | HEIGHT: 64 IN | SYSTOLIC BLOOD PRESSURE: 101 MMHG

## 2024-08-13 DIAGNOSIS — I10 ESSENTIAL HYPERTENSION: Primary | ICD-10-CM

## 2024-08-13 DIAGNOSIS — E11.69 TYPE 2 DIABETES MELLITUS WITH OBESITY  (HCC): ICD-10-CM

## 2024-08-13 DIAGNOSIS — E66.9 TYPE 2 DIABETES MELLITUS WITH OBESITY  (HCC): ICD-10-CM

## 2024-08-13 DIAGNOSIS — K74.60 HEPATIC CIRRHOSIS, UNSPECIFIED HEPATIC CIRRHOSIS TYPE, UNSPECIFIED WHETHER ASCITES PRESENT (HCC): ICD-10-CM

## 2024-08-13 RX ORDER — CARVEDILOL 6.25 MG/1
6.25 TABLET ORAL 2 TIMES DAILY WITH MEALS
Qty: 60 TABLET | Refills: 3 | Status: SHIPPED | OUTPATIENT
Start: 2024-08-13 | End: 2024-09-12

## 2024-08-13 NOTE — PATIENT INSTRUCTIONS
Your kidney numbers/function is looking good.  There is no protein in the urine which is a very good sign.  Your blood pressure is currently on the lower side and would recommend decreasing carvedilol to 6.25 mg twice daily.  I have sent a message to your gastroenterologist Dr. Cramer to make sure that he is okay with the change on carvedilol.  We will repeat blood test and urine test in 1 year for follow-up.  Follow-up in the office can be on as-needed basis.

## 2024-08-13 NOTE — PROGRESS NOTES
NEPHROLOGY OFFICE VISIT   Kristy Brown 63 y.o. female MRN: [id number]  8/13/2024    Reason for Visit: Evaluation of kidney function, management of hypertension    ASSESSMENT and PLAN:  It was a pleasure evaluating your patient in the office today. Thank you for allowing our team to participate in the care of Ms. Kristy Brown. Please do not hesitate to contact our team if further issues/questions shall arise in the interim.     # Concern for albuminuria in a patient with diabetes  - Most recent urine albumin to creatinine ratio 7 mg/g 06/2023 not high enough to label as albuminuria   - Urine sediment showed squamous epithelial cells and was essentially bland 08/2023  - Urinalysis: Negative protein, no RBC, no WBC 08/2023  - 24-hour urine protein less than 114 mg per 24 hours 08/2023  - Proteinuria: No, UACR 4 mg/g, UPCR 60 mg 07/2024  - She is currently on RAAS blockade which will help against future albuminuria  - No indication for addition of SGLT2 inhibitor at this time but can be used as add-on therapy for management of diabetes as needed by PCP     # Kidney function  - She has normal kidney function with GFR more than 90, most recent creatinine 0.53 07/2024  - Risk factors for development of kidney disease in future would include hypertension, diabetes and liver cirrhosis  - Discussed importance of good blood pressure control and diabetes control in preventing development of kidney disease  - Discussed avoidance of NSAIDs     # Type 2 diabetes and obesity   - Most recent HbA1c 6.0 07/2024 which is at goal  - Currently on metformin 500 mg twice daily and semaglutide every 7 days  - Discussed importance of weight loss in management of diabetes  - As discussed above, SGLT2 inhibitor can be considered as add-on therapy for management of diabetes in future, not needed from nephrology perspective at this stage     # Hypertension/Volume   - Target Goal: Less than 120/80 per KDIGO guidelines   - Volume  status: Euvolemic  - Status: Blood pressure is currently on lower side and patient complains of lightheadedness  - Current antihypertensive regimen: Coreg 12.5 mg twice daily, lisinopril-hydrochlorothiazide 10-25 mg daily  - Changes: Decrease carvedilol to 6.25 mg twice daily, continue lisinopril-hydrochlorothiazide as such    # History of cirrhosis  - Follows with gastroenterology  - She is on carvedilol 12.5 mg twice daily for prevention of varices  - We have decreased the dose of carvedilol to 6.25 mg twice daily due to blood pressure on the lower side (message sent to patient's gastroenterologist Dr. Cramer)  - We discussed 2 g sodium diet    Follow-up  Renal function panel, urine albumin to creatinine ratio and urine protein to creatinine ratio in 1 year  Follow-up in the office as needed    HPI:  Since last visit: She is complaining of occasional lightheadedness.  She denies dyspnea.  She denies leg swelling.  She denies any trouble with urination.    Kristy Brown is a 63 y.o. female who has history of diabetes for last 6 to 7 years.  She has history of hypertension for last 10 years.  She has history of liver cirrhosis.  She previously used to drink alcohol but has now quit completely.  He has chronic dyspnea.  She did leg swelling.  She denies any trouble with urination.  She does not take NSAIDs.  She does not have any personal or family history of kidney disease.     >> Medical history evaluation   - Diabetes: Yes for 6-7 years  - Diabetic retinopathy: No   - Hypertension: Yes for 10 years   - Age ? 55 years: Yes   - Family history of kidney disease: No  - Obesity or metabolic syndrome: Yes   - Prior kidney disease or dialysis: No   - Incidental hematuria in the past: No    - Urinary symptoms: No  - History of foamy or frothy urine: No  - History of nephrolithiasis: No   - Diseases that share risk factors with CKD: DM, HTN  - Systemic diseases that might affect kidney: No  - History of use of  "medications that might affect renal function: No     PATIENT INSTRUCTIONS:    Patient Instructions   Your kidney numbers/function is looking good.  There is no protein in the urine which is a very good sign.  Your blood pressure is currently on the lower side and would recommend decreasing carvedilol to 6.25 mg twice daily.  I have sent a message to your gastroenterologist Dr. Cramer to make sure that he is okay with the change on carvedilol.  We will repeat blood test and urine test in 1 year for follow-up.  Follow-up in the office can be on as-needed basis.      OBJECTIVE:  Current Weight: Weight - Scale: 113 kg (250 lb)  Vitals:    08/13/24 1155   BP: 101/69   BP Location: Left arm   Patient Position: Sitting   Cuff Size: Large   Pulse: 71   Weight: 113 kg (250 lb)   Height: 5' 4\" (1.626 m)    Body mass index is 42.91 kg/m².      REVIEW OF SYSTEMS:    Review of Systems   Constitutional:  Negative for chills and fever.   HENT:  Negative for ear pain and sore throat.    Eyes:  Negative for pain and visual disturbance.   Respiratory:  Negative for cough and shortness of breath.    Cardiovascular:  Negative for chest pain and palpitations.   Gastrointestinal:  Negative for abdominal pain and vomiting.   Genitourinary:  Negative for dysuria and hematuria.   Musculoskeletal:  Negative for arthralgias and back pain.   Skin:  Negative for color change and rash.   Neurological:  Positive for light-headedness. Negative for seizures and syncope.   All other systems reviewed and are negative.      Past Medical History:   Diagnosis Date    Cirrhosis (HCC)     Diabetes mellitus (HCC)     Ear problems     Encounter for immunization 01/11/2024    GERD (gastroesophageal reflux disease)     HL (hearing loss)     Hypertension     Seasonal allergies     Thrombocytopenia, unspecified (HCC)     Per CMS ICD 10 Guidelines--Per Phyisician    Tinnitus        Past Surgical History:   Procedure Laterality Date    ACHILLES TENDON REPAIR " Right     COLONOSCOPY      HYSTERECTOMY      age 40    TYMPANOSTOMY TUBE PLACEMENT Right     UPPER GASTROINTESTINAL ENDOSCOPY         Family History   Problem Relation Age of Onset    Lung cancer Sister     Cancer Sister     Hypertension Mother     No Known Problems Father     No Known Problems Maternal Grandmother     No Known Problems Maternal Grandfather     No Known Problems Paternal Grandmother     No Known Problems Paternal Grandfather     No Known Problems Brother     No Known Problems Brother         Social History     Substance and Sexual Activity   Alcohol Use No    Comment: Sober for 20 years     Social History     Substance and Sexual Activity   Drug Use No     Social History     Tobacco Use   Smoking Status Every Day    Current packs/day: 0.25    Average packs/day: 0.3 packs/day for 40.0 years (10.0 ttl pk-yrs)    Types: Cigarettes   Smokeless Tobacco Never       PHYSICAL EXAM:      Physical Exam  Constitutional:       Appearance: Normal appearance.   HENT:      Head: Normocephalic and atraumatic.   Cardiovascular:      Rate and Rhythm: Normal rate and regular rhythm.      Pulses: Normal pulses.      Heart sounds: Normal heart sounds.   Pulmonary:      Effort: Pulmonary effort is normal.      Breath sounds: Normal breath sounds.   Abdominal:      Tenderness: There is no right CVA tenderness or left CVA tenderness.   Musculoskeletal:         General: Normal range of motion.      Right lower leg: No edema.      Left lower leg: No edema.   Skin:     General: Skin is warm.   Neurological:      Mental Status: She is alert and oriented to person, place, and time. Mental status is at baseline.   Psychiatric:         Mood and Affect: Mood normal.       Medications:    Current Outpatient Medications:     ALPRAZolam (XANAX) 0.25 mg tablet, Take 1 tablet (0.25 mg total) by mouth daily at bedtime as needed for anxiety, Disp: 30 tablet, Rfl: 0    atorvastatin (LIPITOR) 20 mg tablet, Take 1 tablet (20 mg total) by  "mouth daily, Disp: 90 tablet, Rfl: 1    carvedilol (COREG) 6.25 mg tablet, Take 1 tablet (6.25 mg total) by mouth 2 (two) times a day with meals, Disp: 60 tablet, Rfl: 3    cetirizine (ZyrTEC) 10 mg tablet, Take 10 mg by mouth daily at bedtime, Disp: , Rfl:     Diclofenac Sodium (VOLTAREN) 1 %, Apply 2 g topically 4 (four) times a day (Patient taking differently: Apply 2 g topically 4 (four) times a day Uses prn), Disp: 100 g, Rfl: 2    lisinopril-hydrochlorothiazide (PRINZIDE,ZESTORETIC) 10-12.5 MG per tablet, Take 1 tablet by mouth daily, Disp: 90 tablet, Rfl: 1    metFORMIN (GLUCOPHAGE) 500 mg tablet, Take 1 tablet (500 mg total) by mouth 2 (two) times a day with meals, Disp: 180 tablet, Rfl: 1    Multiple Vitamin (multivitamin) capsule, Take 1 capsule by mouth daily, Disp: , Rfl:     semaglutide, 0.25 or 0.5 mg/dose, (Ozempic, 0.25 or 0.5 MG/DOSE,) 2 mg/3 mL injection pen, Inject 0.75 mL (0.5 mg total) under the skin every 7 days, Disp: 3 mL, Rfl: 3    VITAMIN D, CHOLECALCIFEROL, PO, Take 10,000 Units by mouth in the morning, Disp: , Rfl:     Accu-Chek Guide test strip, USE TO CHECK BLOOD SUGAR ONCE DAILY AS DIRECTED (Patient not taking: Reported on 8/21/2023), Disp: 100 strip, Rfl: 1    ibuprofen (MOTRIN) 800 mg tablet, Take 1 tablet (800 mg total) by mouth every 8 (eight) hours as needed for mild pain (Patient not taking: Reported on 7/10/2024), Disp: 30 tablet, Rfl: 0    Laboratory Results:        Invalid input(s): \"ALBUMIN\"    Results for orders placed or performed during the hospital encounter of 07/24/24   Fingerstick Glucose (POCT)   Result Value Ref Range    POC Glucose 128 65 - 140 mg/dl         "

## 2024-08-15 ENCOUNTER — TELEPHONE (OUTPATIENT)
Dept: NEPHROLOGY | Facility: CLINIC | Age: 64
End: 2024-08-15

## 2024-08-15 NOTE — TELEPHONE ENCOUNTER
LM for patient about the following, asked her to call back if she has any questions:    ----- Message from Jean Paul Chahal MD sent at 8/15/2024 12:28 PM EDT -----  Regarding: FW: Carvedilol dose change  Please let the patient know that I have discussed with Dr. Cramer and he is okay with changing the dose of carvedilol as we did on most recent office visit.  Thank you.  ----- Message -----  From: Shaggy Cramer MD  Sent: 8/15/2024  10:02 AM EDT  To: Jean Paul Chahal MD  Subject: RE: Carvedilol dose change                       Thank you, that dose should be fine.  I appreciate you letting me know.  Rolando  ----- Message -----  From: Jean Paul Chahal MD  Sent: 8/13/2024  12:13 PM EDT  To: Shaggy Cramer MD  Subject: Carvedilol dose change                           Elizabeth Cramer, reaching out to you regarding mutual patient Jenn.  I saw her in the office today and blood pressure was on the lower side.  She is on carvedilol 12.5 mg twice daily for prevention of varices.  I decreased her carvedilol to 6.25 mg twice daily.  I just wanted to make you aware of the change and to make sure that you are okay with this change.  Let me know if your thoughts.  Thank you.

## 2024-08-26 DIAGNOSIS — E11.69 TYPE 2 DIABETES MELLITUS WITH OBESITY  (HCC): ICD-10-CM

## 2024-08-26 DIAGNOSIS — E66.9 TYPE 2 DIABETES MELLITUS WITH OBESITY  (HCC): ICD-10-CM

## 2024-09-13 DIAGNOSIS — K74.60 HEPATIC CIRRHOSIS, UNSPECIFIED HEPATIC CIRRHOSIS TYPE, UNSPECIFIED WHETHER ASCITES PRESENT (HCC): ICD-10-CM

## 2024-09-13 RX ORDER — CARVEDILOL 6.25 MG/1
6.25 TABLET ORAL 2 TIMES DAILY WITH MEALS
Qty: 60 TABLET | Refills: 0 | OUTPATIENT
Start: 2024-09-13 | End: 2024-10-13

## 2024-09-23 DIAGNOSIS — E11.69 TYPE 2 DIABETES MELLITUS WITH OBESITY  (HCC): ICD-10-CM

## 2024-09-23 DIAGNOSIS — E66.9 TYPE 2 DIABETES MELLITUS WITH OBESITY  (HCC): ICD-10-CM

## 2024-09-23 RX ORDER — CARVEDILOL 6.25 MG/1
6.25 TABLET ORAL 2 TIMES DAILY WITH MEALS
Qty: 60 TABLET | Refills: 5 | OUTPATIENT
Start: 2024-09-23 | End: 2025-03-22

## 2024-09-23 NOTE — TELEPHONE ENCOUNTER
Patient called to request a refill for their carvedilol advised a refill was requested on 9/13/24 and is pending approval. Patient verbalized understanding and is in agreement.

## 2024-11-18 DIAGNOSIS — E66.9 TYPE 2 DIABETES MELLITUS WITH OBESITY  (HCC): ICD-10-CM

## 2024-11-18 DIAGNOSIS — E11.69 TYPE 2 DIABETES MELLITUS WITH OBESITY  (HCC): ICD-10-CM

## 2024-11-18 DIAGNOSIS — F41.9 ANXIETY: ICD-10-CM

## 2024-11-19 RX ORDER — ALPRAZOLAM 0.25 MG/1
0.25 TABLET ORAL
Qty: 30 TABLET | Refills: 0 | Status: SHIPPED | OUTPATIENT
Start: 2024-11-19

## 2024-11-19 NOTE — TELEPHONE ENCOUNTER
PD MP Last refill 08/05/2024 # 30     Patient is scheduled with you in January.    TC to pt  LM  Given circumstances surrounding COVID-19 recommend the up coming visit be conducted as a televisit with pt's consent.   Caro Antonio

## 2024-12-16 DIAGNOSIS — E66.9 TYPE 2 DIABETES MELLITUS WITH OBESITY  (HCC): ICD-10-CM

## 2024-12-16 DIAGNOSIS — E11.69 TYPE 2 DIABETES MELLITUS WITH OBESITY  (HCC): ICD-10-CM

## 2024-12-17 ENCOUNTER — APPOINTMENT (OUTPATIENT)
Dept: LAB | Facility: CLINIC | Age: 64
End: 2024-12-17
Payer: MEDICARE

## 2024-12-17 ENCOUNTER — RESULTS FOLLOW-UP (OUTPATIENT)
Dept: FAMILY MEDICINE CLINIC | Facility: CLINIC | Age: 64
End: 2024-12-17

## 2024-12-17 DIAGNOSIS — E66.9 TYPE 2 DIABETES MELLITUS WITH OBESITY  (HCC): ICD-10-CM

## 2024-12-17 DIAGNOSIS — E78.2 MIXED HYPERLIPIDEMIA: ICD-10-CM

## 2024-12-17 DIAGNOSIS — E11.69 TYPE 2 DIABETES MELLITUS WITH OBESITY  (HCC): ICD-10-CM

## 2024-12-17 LAB
ALBUMIN SERPL BCG-MCNC: 4.5 G/DL (ref 3.5–5)
ALP SERPL-CCNC: 85 U/L (ref 34–104)
ALT SERPL W P-5'-P-CCNC: 24 U/L (ref 7–52)
ANION GAP SERPL CALCULATED.3IONS-SCNC: 9 MMOL/L (ref 4–13)
AST SERPL W P-5'-P-CCNC: 27 U/L (ref 13–39)
BILIRUB SERPL-MCNC: 0.86 MG/DL (ref 0.2–1)
BUN SERPL-MCNC: 8 MG/DL (ref 5–25)
CALCIUM SERPL-MCNC: 10.1 MG/DL (ref 8.4–10.2)
CHLORIDE SERPL-SCNC: 102 MMOL/L (ref 96–108)
CHOLEST SERPL-MCNC: 131 MG/DL (ref ?–200)
CO2 SERPL-SCNC: 30 MMOL/L (ref 21–32)
CREAT SERPL-MCNC: 0.57 MG/DL (ref 0.6–1.3)
CREAT UR-MCNC: 125.4 MG/DL
EST. AVERAGE GLUCOSE BLD GHB EST-MCNC: 111 MG/DL
GFR SERPL CREATININE-BSD FRML MDRD: 96 ML/MIN/1.73SQ M
GLUCOSE P FAST SERPL-MCNC: 114 MG/DL (ref 65–99)
HBA1C MFR BLD: 5.5 %
HDLC SERPL-MCNC: 35 MG/DL
LDLC SERPL CALC-MCNC: 70 MG/DL (ref 0–100)
MICROALBUMIN UR-MCNC: 7.1 MG/L
MICROALBUMIN/CREAT 24H UR: 6 MG/G CREATININE (ref 0–30)
POTASSIUM SERPL-SCNC: 3.8 MMOL/L (ref 3.5–5.3)
PROT SERPL-MCNC: 7.3 G/DL (ref 6.4–8.4)
SODIUM SERPL-SCNC: 141 MMOL/L (ref 135–147)
TRIGL SERPL-MCNC: 130 MG/DL (ref ?–150)

## 2024-12-17 PROCEDURE — 80061 LIPID PANEL: CPT

## 2024-12-17 PROCEDURE — 82570 ASSAY OF URINE CREATININE: CPT

## 2024-12-17 PROCEDURE — 83036 HEMOGLOBIN GLYCOSYLATED A1C: CPT

## 2024-12-17 PROCEDURE — 80053 COMPREHEN METABOLIC PANEL: CPT

## 2024-12-17 PROCEDURE — 82043 UR ALBUMIN QUANTITATIVE: CPT

## 2024-12-17 PROCEDURE — 36415 COLL VENOUS BLD VENIPUNCTURE: CPT

## 2025-01-02 DIAGNOSIS — Z78.0 MENOPAUSE: Primary | ICD-10-CM

## 2025-01-06 ENCOUNTER — HOSPITAL ENCOUNTER (OUTPATIENT)
Facility: HOSPITAL | Age: 65
Discharge: HOME | End: 2025-01-06
Payer: MEDICARE

## 2025-01-06 VITALS — WEIGHT: 227 LBS | HEIGHT: 64 IN | BODY MASS INDEX: 38.76 KG/M2

## 2025-01-06 VITALS — BODY MASS INDEX: 44.3 KG/M2 | HEIGHT: 63 IN | WEIGHT: 250 LBS

## 2025-01-06 DIAGNOSIS — Z78.0 MENOPAUSE: ICD-10-CM

## 2025-01-06 DIAGNOSIS — Z12.31 SCREENING MAMMOGRAM FOR BREAST CANCER: ICD-10-CM

## 2025-01-06 PROCEDURE — 77080 DXA BONE DENSITY AXIAL: CPT

## 2025-01-06 PROCEDURE — 77063 BREAST TOMOSYNTHESIS BI: CPT

## 2025-01-06 PROCEDURE — 77067 SCR MAMMO BI INCL CAD: CPT

## 2025-01-07 ENCOUNTER — OFFICE VISIT (OUTPATIENT)
Dept: GASTROENTEROLOGY | Facility: CLINIC | Age: 65
End: 2025-01-07
Payer: MEDICARE

## 2025-01-07 ENCOUNTER — TELEPHONE (OUTPATIENT)
Dept: GASTROENTEROLOGY | Facility: CLINIC | Age: 65
End: 2025-01-07

## 2025-01-07 VITALS
HEIGHT: 63 IN | WEIGHT: 250 LBS | HEART RATE: 73 BPM | SYSTOLIC BLOOD PRESSURE: 131 MMHG | DIASTOLIC BLOOD PRESSURE: 85 MMHG | BODY MASS INDEX: 44.3 KG/M2

## 2025-01-07 DIAGNOSIS — Z11.59 ENCOUNTER FOR SCREENING FOR OTHER VIRAL DISEASES: ICD-10-CM

## 2025-01-07 DIAGNOSIS — E66.01 MORBID OBESITY (HCC): ICD-10-CM

## 2025-01-07 DIAGNOSIS — K74.69 OTHER CIRRHOSIS OF LIVER (HCC): Primary | ICD-10-CM

## 2025-01-07 DIAGNOSIS — K74.60 HEPATIC CIRRHOSIS, UNSPECIFIED HEPATIC CIRRHOSIS TYPE, UNSPECIFIED WHETHER ASCITES PRESENT (HCC): ICD-10-CM

## 2025-01-07 DIAGNOSIS — I85.10 SECONDARY ESOPHAGEAL VARICES WITHOUT BLEEDING (HCC): ICD-10-CM

## 2025-01-07 DIAGNOSIS — K80.20 CALCULUS OF GALLBLADDER WITHOUT CHOLECYSTITIS WITHOUT OBSTRUCTION: ICD-10-CM

## 2025-01-07 PROCEDURE — 99214 OFFICE O/P EST MOD 30 MIN: CPT | Performed by: NURSE PRACTITIONER

## 2025-01-07 RX ORDER — CARVEDILOL 6.25 MG/1
6.25 TABLET ORAL 2 TIMES DAILY WITH MEALS
Qty: 60 TABLET | Refills: 3 | Status: SHIPPED | OUTPATIENT
Start: 2025-01-07 | End: 2025-02-06

## 2025-01-07 NOTE — ASSESSMENT & PLAN NOTE
Continues to do well, MELD 3.0 8  -- Etiology: MARLEN, working on weight loss with Ozempic  -- Esophageal variceal screening: UTD, next due in July 2025  -- Ascites: None present. Recommend 2g Na diet (no added salt to food and no pre-packaged foods).   -- Portosystemic encephalopathy: None present at this time. No indication for lactulose or rifaximin.   -- HCC Screening: Due for screening, AFP/RUQ ordered. Continue q6 months   -- Yearly flu shot, pneumococcal vaccine - to be discussed with PCP.   -- Avoid raw fish, fish tanks, shellfish  -- Immunizations for hepatitis A and B - labs ordered, if not already immune recommend f/u with PCP  -- Avoid alcohol, tobacco use, NSAIDs  -- Okay to take up to 2g of Tylenol  -- Screening colonoscopy due in October 2027    MELD 3.0: 8 at 1/10/2024  9:35 AM  MELD-Na: 7 at 1/10/2024  9:35 AM  Calculated from:  Serum Creatinine: 0.65 mg/dL (Using min of 1 mg/dL) at 1/10/2024  9:35 AM  Serum Sodium: 140 mmol/L (Using max of 137 mmol/L) at 1/10/2024  9:35 AM  Total Bilirubin: 0.87 mg/dL (Using min of 1 mg/dL) at 1/10/2024  9:35 AM  Serum Albumin: 4.3 g/dL (Using max of 3.5 g/dL) at 1/10/2024  9:35 AM  INR(ratio): 1.09 at 1/10/2024  9:35 AM  Age at listing (hypothetical): 63 years  Sex: Female at 1/10/2024  9:35 AM      Orders:    CBC (Includes Diff/Plt) (Refl); Future    Protime-INR; Future    AFP tumor marker; Future    US right upper quadrant; Future    Hepatitis A antibody, total; Future    Hepatitis B surface antigen; Future    Hepatitis B surface antibody; Future    Hepatitis B core antibody, total; Future

## 2025-01-07 NOTE — PATIENT INSTRUCTIONS
"Patient Education     Cirrhosis   The Basics   Written by the doctors and editors at [facility]   What is cirrhosis? -- Cirrhosis is a disease that scars the liver. The liver is a big organ in the upper right side of the belly (figure 1). Damage to the liver can cause heavy bleeding, swelling, and breathing problems.  What are the symptoms of cirrhosis? -- Some people with cirrhosis have no symptoms. When symptoms do happen, they can include:   Swelling in the belly and legs, and fluid buildup in the lungs   Heavy bleeding from blood vessels in the esophagus (the tube that connects the mouth to the stomach)   Bruising or bleeding easily   Trouble breathing   Feeling full   Feeling tired   Trouble getting enough sleep or sleeping too much   Yellowing of the skin or whites of the eyes, called jaundice   Confusion that can start suddenly   Coma  Cirrhosis makes it more likely that you will get infections. It can also increase your risk of liver cancer.  What causes cirrhosis? -- When something harms the liver, the organ tries to fix itself. In the process, scars form. Causes of liver damage include:   Heavy alcohol use - People with heavy alcohol use are at higher risk for cirrhosis.   Hepatitis B or hepatitis C - These are liver diseases caused by viruses. People can get the viruses by sharing needles or having sex with people who are infected.   Nonalcoholic steatohepatitis (\"ESCALANTE\") - People with this condition often don't drink alcohol. Doctors aren't sure what causes ESCALANTE. But many people who have it are overweight and have diabetes.  Is there a test for cirrhosis? -- Yes. Tests include:   Biopsy - For this test, a doctor puts a needle into your liver. They use the needle to take out a small sample of tissue. The sample will show how severe the damage is.   Blood tests - Results can show what is causing the disease.   Imaging tests - These tests create pictures of your liver. They might include an ultrasound or MRI " "scan.  Is there anything I can do on my own? -- Yes. To help prevent further liver damage, you can:   Avoid alcohol.   Talk to your doctor before you start taking any new medicines. This includes pain relievers such as ibuprofen (sample brand names: Advil, Motrin), naproxen (sample brand name: Aleve), or acetaminophen (sample brand name: Tylenol). Also, talk to your doctor before taking any herbs, vitamins, or supplements. Some medicines and supplements can damage the liver.   Ask your doctor if you should be vaccinated against hepatitis A or B.  How is cirrhosis treated? -- Treatments depend on the cause of cirrhosis, how severe it is, and what symptoms you have.  Treatments fall into a few main categories. They can:   Treat the cause of the disease - Some causes of cirrhosis can be treated. For example, people with cirrhosis caused by heavy alcohol use can stop drinking alcohol. People with chronic hepatitis C or B can take medicines.   Lower the risk of bleeding - Cirrhosis can cause the blood vessels around the esophagus to swell or even burst and bleed (figure 2). To prevent this, doctors can:   Prescribe medicines called \"beta blockers.\" These medicines reduce blood pressure in the liver, and help reduce the chance of bleeding.   Place tiny bands around the swollen blood vessels. This procedure is called \"variceal band ligation.\"   Decrease fluid buildup in the belly - In people with cirrhosis, the belly sometimes fills with fluid. To decrease fluid buildup, doctors can:   Prescribe medicines called \"diuretics.\" These are sometimes called \"water pills.\" They make you urinate a lot. People who take diuretic medicines often must also eat less salt.   Drain the fluid from your belly using a needle. This procedure is called a \"paracentesis.\"   Implant a device in the liver that reduces fluid buildup in the belly. This procedure is called \"TIPS.\"   Treat or prevent infection - People with cirrhosis have a higher " "than normal chance of getting infections. When they get an infection, they can also get much sicker than people without cirrhosis. As a result, people with cirrhosis sometimes need antibiotics to either treat or prevent infection. Most people with cirrhosis should also get the flu vaccine and other vaccines to prevent common infections.   Treat confusion - Cirrhosis can lead to confusion. Doctors can prescribe different medicines to treat the confusion.  Will I need a new liver? -- Some people with severe cirrhosis need surgery to get a new liver. This is called a \"liver transplant.\" Talk to your doctor about this option before you get too sick, to find out if a liver transplant might be an option for you. People often have to wait for up to 2 years to get a new liver.  Can cirrhosis be prevented? -- You can lower your chances of getting cirrhosis if you:   Get help if you have an alcohol problem.   Get the vaccines for hepatitis B and hepatitis A, if you haven't already.   Use condoms when having sex.   Do not share drug needles.  When should I call the doctor? -- If you have cirrhosis, call your doctor or nurse if you have:   Problems with bleeding, like:   Blood in your stool   Vomiting blood   Bleeding or bruising   Symptoms of infection, such as fever over 100.4°F (38°C) or chills   Belly pain   Swollen legs or ankles   Trouble breathing   Extreme tiredness   Confusion   Yellowing of the skin or whites of your eyes, called jaundice  All topics are updated as new evidence becomes available and our peer review process is complete.  This topic retrieved from [employer] on: Mar 29, 2024.  Topic 65847 Version 21.0  Release: 32.2.4 - C32.87  © 2024 UpToDate, Inc. and/or its affiliates. All rights reserved.  figure 1: Organs inside the abdomen (belly)     Graphic 35980 Version 8.0  figure 2: Esophageal varices     Cirrhosis can cause the blood vessels around the esophagus to swell. This is called \"esophageal varices.\" " In severe cases, these blood vessels can burst and cause internal bleeding.  Graphic 27672 Version 6.0  Consumer Information Use and Disclaimer   Disclaimer: This generalized information is a limited summary of diagnosis, treatment, and/or medication information. It is not meant to be comprehensive and should be used as a tool to help the user understand and/or assess potential diagnostic and treatment options. It does NOT include all information about conditions, treatments, medications, side effects, or risks that may apply to a specific patient. It is not intended to be medical advice or a substitute for the medical advice, diagnosis, or treatment of a health care provider based on the health care provider's examination and assessment of a patient's specific and unique circumstances. Patients must speak with a health care provider for complete information about their health, medical questions, and treatment options, including any risks or benefits regarding use of medications. This information does not endorse any treatments or medications as safe, effective, or approved for treating a specific patient. UpToDate, Inc. and its affiliates disclaim any warranty or liability relating to this information or the use thereof.The use of this information is governed by the Terms of Use, available at https://www.[employer].com/en/know/clinical-effectiveness-terms. 2024© UpToDate, Inc. and its affiliates and/or licensors. All rights reserved.  Copyright   © 2024 UpToDate, Inc. and/or its affiliates. All rights reserved.

## 2025-01-07 NOTE — ASSESSMENT & PLAN NOTE
Last EGD 7/24/24 with a single small grade 1 varix in the lower third of the esophagus  On Carvedilol for variceal prophylaxis decreased to 6.25 mg BID in August due to low BP    -Continue Carvedilol (target HR 55-60 while maintaining SBP>90)  -EGD scheduled for July 2025 for surveillance

## 2025-01-07 NOTE — PROGRESS NOTES
Name: Kristy Brown      : 1960      MRN: [id number]  Encounter Provider: SHAWNA Pisano  Encounter Date: 2025   Encounter department: [facility] GASTROENTEROLOGY [place] [address]  :  Assessment & Plan  Other cirrhosis of liver (HCC)  Continues to do well, MELD 3.0 8  -- Etiology: MASH, working on weight loss with Ozempic  -- Esophageal variceal screening: UTD, next due in 2025  -- Ascites: None present. Recommend 2g Na diet (no added salt to food and no pre-packaged foods).   -- Portosystemic encephalopathy: None present at this time. No indication for lactulose or rifaximin.   -- HCC Screening: Due for screening, AFP/RUQ ordered. Continue q6 months   -- Yearly flu shot, pneumococcal vaccine - to be discussed with PCP.   -- Avoid raw fish, fish tanks, shellfish  -- Immunizations for hepatitis A and B - labs ordered, if not already immune recommend f/u with PCP  -- Avoid alcohol, tobacco use, NSAIDs  -- Okay to take up to 2g of Tylenol  -- Screening colonoscopy due in 2027    MELD 3.0: 8 at 1/10/2024  9:35 AM  MELD-Na: 7 at 1/10/2024  9:35 AM  Calculated from:  Serum Creatinine: 0.65 mg/dL (Using min of 1 mg/dL) at 1/10/2024  9:35 AM  Serum Sodium: 140 mmol/L (Using max of 137 mmol/L) at 1/10/2024  9:35 AM  Total Bilirubin: 0.87 mg/dL (Using min of 1 mg/dL) at 1/10/2024  9:35 AM  Serum Albumin: 4.3 g/dL (Using max of 3.5 g/dL) at 1/10/2024  9:35 AM  INR(ratio): 1.09 at 1/10/2024  9:35 AM  Age at listing (hypothetical): 63 years  Sex: Female at 1/10/2024  9:35 AM      Orders:    CBC (Includes Diff/Plt) (Refl); Future    Protime-INR; Future    AFP tumor marker; Future    US right upper quadrant; Future    Hepatitis A antibody, total; Future    Hepatitis B surface antigen; Future    Hepatitis B surface antibody; Future    Hepatitis B core antibody, total; Future    Secondary esophageal varices without bleeding (HCC)  Last EGD 24 with a single small grade 1  "varix in the lower third of the esophagus  On Carvedilol for variceal prophylaxis decreased to 6.25 mg BID in August due to low BP    -Continue Carvedilol (target HR 55-60 while maintaining SBP>90)  -EGD scheduled for July 2025 for surveillance         Morbid obesity (HCC)  On Ozempic, has lost 36 pounds so far       Encounter for screening for other viral diseases    Orders:    Hepatitis B surface antigen; Future    Hepatitis B surface antibody; Future    Hepatitis B core antibody, total; Future    Calculus of gallbladder without cholecystitis without obstruction  Noted on RUQ US, asymptomatic  Discussed s/s to watch out for           History of Present Illness   HPI  Kristy Brown is a 64 y.o. female who presents for routine follow up to Cirrhosis. She has lost weight on Ozempic was 263 now down to 227lb. Denies any confusion, lower extremity swelling, GI bleeding, abdominal pain/swelling, nausea/vomiting. Reports in August her Coreg was decreased as BP was low, but now SBP 130s in the office. She is due for EGD in July and due for HCC screening.           Review of Systems   Constitutional:  Negative for chills and fever.   Eyes:  Negative for visual disturbance.   Respiratory:  Negative for cough and shortness of breath.    Cardiovascular:  Negative for chest pain, palpitations and leg swelling.   Gastrointestinal:  Negative for abdominal pain and vomiting.   Skin:  Negative for rash.   Neurological:  Negative for dizziness.   All other systems reviewed and are negative.         Objective   /91 (BP Location: Left arm, Patient Position: Sitting, Cuff Size: Standard)   Pulse 73   Ht 5' 3\" (1.6 m)   Wt 113 kg (250 lb)   BMI 44.29 kg/m²      Physical Exam  Vitals and nursing note reviewed.   Constitutional:       General: She is not in acute distress.     Appearance: She is well-developed.   HENT:      Head: Normocephalic and atraumatic.   Eyes:      Conjunctiva/sclera: Conjunctivae normal. "   Cardiovascular:      Rate and Rhythm: Normal rate and regular rhythm.      Heart sounds: No murmur heard.  Pulmonary:      Effort: Pulmonary effort is normal. No respiratory distress.      Breath sounds: Normal breath sounds.   Abdominal:      Palpations: Abdomen is soft.      Tenderness: There is no abdominal tenderness.   Musculoskeletal:         General: No swelling.      Cervical back: Neck supple.   Skin:     General: Skin is warm and dry.      Capillary Refill: Capillary refill takes less than 2 seconds.   Neurological:      Mental Status: She is alert and oriented to person, place, and time.   Psychiatric:         Mood and Affect: Mood normal.

## 2025-01-07 NOTE — TELEPHONE ENCOUNTER
Scheduled date of EGD(as of today): 7/25/25  Physician performing EGD: Dr Cramer  Location of EGD:   Instructions reviewed with patient by: tonja given at appt   Clearances: n/a  Diabetic - 7 day hold on Ozempic, pt aware, Metformin

## 2025-01-08 ENCOUNTER — RA CDI HCC (OUTPATIENT)
Dept: OTHER | Facility: HOSPITAL | Age: 65
End: 2025-01-08

## 2025-01-08 NOTE — PROGRESS NOTES
HCC coding opportunities          Chart Reviewed number of suggestions sent to Provider: 1     Patients Insurance   K74.69  Medicare Insurance: Medicare

## 2025-01-09 ENCOUNTER — RESULTS FOLLOW-UP (OUTPATIENT)
Dept: FAMILY MEDICINE CLINIC | Facility: CLINIC | Age: 65
End: 2025-01-09

## 2025-01-14 ENCOUNTER — OFFICE VISIT (OUTPATIENT)
Dept: FAMILY MEDICINE CLINIC | Facility: CLINIC | Age: 65
End: 2025-01-14
Payer: MEDICARE

## 2025-01-14 VITALS
WEIGHT: 226 LBS | BODY MASS INDEX: 40.04 KG/M2 | SYSTOLIC BLOOD PRESSURE: 136 MMHG | HEART RATE: 74 BPM | OXYGEN SATURATION: 99 % | DIASTOLIC BLOOD PRESSURE: 78 MMHG | HEIGHT: 63 IN | RESPIRATION RATE: 16 BRPM

## 2025-01-14 DIAGNOSIS — E66.9 TYPE 2 DIABETES MELLITUS WITH OBESITY  (HCC): ICD-10-CM

## 2025-01-14 DIAGNOSIS — E11.69 TYPE 2 DIABETES MELLITUS WITH OBESITY  (HCC): ICD-10-CM

## 2025-01-14 DIAGNOSIS — Z00.00 MEDICARE ANNUAL WELLNESS VISIT, SUBSEQUENT: Primary | ICD-10-CM

## 2025-01-14 DIAGNOSIS — I10 ESSENTIAL HYPERTENSION: ICD-10-CM

## 2025-01-14 DIAGNOSIS — E78.2 MIXED HYPERLIPIDEMIA: ICD-10-CM

## 2025-01-14 DIAGNOSIS — F41.9 ANXIETY: ICD-10-CM

## 2025-01-14 PROCEDURE — G0438 PPPS, INITIAL VISIT: HCPCS | Performed by: FAMILY MEDICINE

## 2025-01-14 PROCEDURE — 99214 OFFICE O/P EST MOD 30 MIN: CPT | Performed by: FAMILY MEDICINE

## 2025-01-14 RX ORDER — ALPRAZOLAM 0.25 MG/1
0.25 TABLET ORAL
Qty: 30 TABLET | Refills: 1 | Status: SHIPPED | OUTPATIENT
Start: 2025-01-14

## 2025-01-14 RX ORDER — ATORVASTATIN CALCIUM 20 MG/1
20 TABLET, FILM COATED ORAL DAILY
Qty: 90 TABLET | Refills: 1 | Status: SHIPPED | OUTPATIENT
Start: 2025-01-14

## 2025-01-14 RX ORDER — LISINOPRIL AND HYDROCHLOROTHIAZIDE 10; 12.5 MG/1; MG/1
1 TABLET ORAL DAILY
Qty: 90 TABLET | Refills: 1 | Status: SHIPPED | OUTPATIENT
Start: 2025-01-14

## 2025-01-14 NOTE — ASSESSMENT & PLAN NOTE
Lab Results   Component Value Date    HGBA1C 5.5 12/17/2024     A1c improved significantly.  Patient is on Ozempic 1 mg, metformin 500 mg twice daily.  No hypoglycemia, patient advised to reduce the dose of metformin to 500 once daily.  Continue with low-carb diet.  Monitoring with metabolic labs at 6-month interval.    Orders:    metFORMIN (GLUCOPHAGE) 500 mg tablet; Take 1 tablet (500 mg total) by mouth daily at bedtime    semaglutide, 1 mg/dose, (Ozempic) 4 mg/3 mL injection pen; Inject 0.75 mL (1 mg total) under the skin once a week    Comprehensive metabolic panel; Future    Hemoglobin A1C; Future

## 2025-01-14 NOTE — PATIENT INSTRUCTIONS
Medicare Preventive Visit Patient Instructions  Thank you for completing your Welcome to Medicare Visit or Medicare Annual Wellness Visit today. Your next wellness visit will be due in one year (1/15/2026).  The screening/preventive services that you may require over the next 5-10 years are detailed below. Some tests may not apply to you based off risk factors and/or age. Screening tests ordered at today's visit but not completed yet may show as past due. Also, please note that scanned in results may not display below.  Preventive Screenings:  Service Recommendations Previous Testing/Comments   Colorectal Cancer Screening  * Colonoscopy    * Fecal Occult Blood Test (FOBT)/Fecal Immunochemical Test (FIT)  * Fecal DNA/Cologuard Test  * Flexible Sigmoidoscopy Age: 45-75 years old   Colonoscopy: every 10 years (may be performed more frequently if at higher risk)  OR  FOBT/FIT: every 1 year  OR  Cologuard: every 3 years  OR  Sigmoidoscopy: every 5 years  Screening may be recommended earlier than age 45 if at higher risk for colorectal cancer. Also, an individualized decision between you and your healthcare provider will decide whether screening between the ages of 76-85 would be appropriate. Colonoscopy: 10/09/2017  FOBT/FIT: Not on file  Cologuard: Not on file  Sigmoidoscopy: Not on file    Screening Current     Breast Cancer Screening Age: 40+ years old  Frequency: every 1-2 years  Not required if history of left and right mastectomy Mammogram: 01/06/2025    Screening Current   Cervical Cancer Screening Between the ages of 21-29, pap smear recommended once every 3 years.   Between the ages of 30-65, can perform pap smear with HPV co-testing every 5 years.   Recommendations may differ for women with a history of total hysterectomy, cervical cancer, or abnormal pap smears in past. Pap Smear: 05/21/2018    Screening Not Indicated   Hepatitis C Screening Once for adults born between 1945 and 1965  More frequently in  patients at high risk for Hepatitis C Hep C Antibody: 05/29/2019    Screening Current   Diabetes Screening 1-2 times per year if you're at risk for diabetes or have pre-diabetes Fasting glucose: 114 mg/dL (12/17/2024)  A1C: 5.5 % (12/17/2024)  Screening Not Indicated  History Diabetes   Cholesterol Screening Once every 5 years if you don't have a lipid disorder. May order more often based on risk factors. Lipid panel: 12/17/2024    Screening Not Indicated  History Lipid Disorder     Other Preventive Screenings Covered by Medicare:  Abdominal Aortic Aneurysm (AAA) Screening: covered once if your at risk. You're considered to be at risk if you have a family history of AAA.  Lung Cancer Screening: covers low dose CT scan once per year if you meet all of the following conditions: (1) Age 55-77; (2) No signs or symptoms of lung cancer; (3) Current smoker or have quit smoking within the last 15 years; (4) You have a tobacco smoking history of at least 20 pack years (packs per day multiplied by number of years you smoked); (5) You get a written order from a healthcare provider.  Glaucoma Screening: covered annually if you're considered high risk: (1) You have diabetes OR (2) Family history of glaucoma OR (3)  aged 50 and older OR (4)  American aged 65 and older  Osteoporosis Screening: covered every 2 years if you meet one of the following conditions: (1) You're estrogen deficient and at risk for osteoporosis based off medical history and other findings; (2) Have a vertebral abnormality; (3) On glucocorticoid therapy for more than 3 months; (4) Have primary hyperparathyroidism; (5) On osteoporosis medications and need to assess response to drug therapy.   Last bone density test (DXA Scan): 01/06/2025.  HIV Screening: covered annually if you're between the age of 15-65. Also covered annually if you are younger than 15 and older than 65 with risk factors for HIV infection. For pregnant patients, it is  covered up to 3 times per pregnancy.    Immunizations:  Immunization Recommendations   Influenza Vaccine Annual influenza vaccination during flu season is recommended for all persons aged >= 6 months who do not have contraindications   Pneumococcal Vaccine   * Pneumococcal conjugate vaccine = PCV13 (Prevnar 13), PCV15 (Vaxneuvance), PCV20 (Prevnar 20)  * Pneumococcal polysaccharide vaccine = PPSV23 (Pneumovax) Adults 19-63 yo with certain risk factors or if 65+ yo  If never received any pneumonia vaccine: recommend Prevnar 20 (PCV20)  Give PCV20 if previously received 1 dose of PCV13 or PPSV23   Hepatitis B Vaccine 3 dose series if at intermediate or high risk (ex: diabetes, end stage renal disease, liver disease)   Respiratory syncytial virus (RSV) Vaccine - COVERED BY MEDICARE PART D  * RSVPreF3 (Arexvy) CDC recommends that adults 60 years of age and older may receive a single dose of RSV vaccine using shared clinical decision-making (SCDM)   Tetanus (Td) Vaccine - COST NOT COVERED BY MEDICARE PART B Following completion of primary series, a booster dose should be given every 10 years to maintain immunity against tetanus. Td may also be given as tetanus wound prophylaxis.   Tdap Vaccine - COST NOT COVERED BY MEDICARE PART B Recommended at least once for all adults. For pregnant patients, recommended with each pregnancy.   Shingles Vaccine (Shingrix) - COST NOT COVERED BY MEDICARE PART B  2 shot series recommended in those 19 years and older who have or will have weakened immune systems or those 50 years and older     Health Maintenance Due:      Topic Date Due   • Breast Cancer Screening: Mammogram  01/06/2026   • Colorectal Cancer Screening  10/09/2027   • Hepatitis C Screening  Completed     Immunizations Due:      Topic Date Due   • Pneumococcal Vaccine: 65+ Years (2 of 2 - PPSV23 or PCV20) 08/06/2019   • Influenza Vaccine (1) 09/01/2024   • COVID-19 Vaccine (5 - 2024-25 season) 09/01/2024     Advance  Directives   What are advance directives?  Advance directives are legal documents that state your wishes and plans for medical care. These plans are made ahead of time in case you lose your ability to make decisions for yourself. Advance directives can apply to any medical decision, such as the treatments you want, and if you want to donate organs.   What are the types of advance directives?  There are many types of advance directives, and each state has rules about how to use them. You may choose a combination of any of the following:  Living will:  This is a written record of the treatment you want. You can also choose which treatments you do not want, which to limit, and which to stop at a certain time. This includes surgery, medicine, IV fluid, and tube feedings.   Durable power of  for healthcare (DPAHC):  This is a written record that states who you want to make healthcare choices for you when you are unable to make them for yourself. This person, called a proxy, is usually a family member or a friend. You may choose more than 1 proxy.  Do not resuscitate (DNR) order:  A DNR order is used in case your heart stops beating or you stop breathing. It is a request not to have certain forms of treatment, such as CPR. A DNR order may be included in other types of advance directives.  Medical directive:  This covers the care that you want if you are in a coma, near death, or unable to make decisions for yourself. You can list the treatments you want for each condition. Treatment may include pain medicine, surgery, blood transfusions, dialysis, IV or tube feedings, and a ventilator (breathing machine).  Values history:  This document has questions about your views, beliefs, and how you feel and think about life. This information can help others choose the care that you would choose.  Why are advance directives important?  An advance directive helps you control your care. Although spoken wishes may be used, it  is better to have your wishes written down. Spoken wishes can be misunderstood, or not followed. Treatments may be given even if you do not want them. An advance directive may make it easier for your family to make difficult choices about your care.   Cigarette Smoking and Your Health   Risks to your health if you smoke:  Nicotine and other chemicals found in tobacco damage every cell in your body. Even if you are a light smoker, you have an increased risk for cancer, heart disease, and lung disease. If you are pregnant or have diabetes, smoking increases your risk for complications.   Benefits to your health if you stop smoking:   You decrease respiratory symptoms such as coughing, wheezing, and shortness of breath.   You reduce your risk for cancers of the lung, mouth, throat, kidney, bladder, pancreas, stomach, and cervix. If you already have cancer, you increase the benefits of chemotherapy. You also reduce your risk for cancer returning or a second cancer from developing.   You reduce your risk for heart disease, blood clots, heart attack, and stroke.   You reduce your risk for lung infections, and diseases such as pneumonia, asthma, chronic bronchitis, and emphysema.  Your circulation improves. More oxygen can be delivered to your body. If you have diabetes, you lower your risk for complications, such as kidney, artery, and eye diseases. You also lower your risk for nerve damage. Nerve damage can lead to amputations, poor vision, and blindness.  You improve your body's ability to heal and to fight infections.  For more information and support to stop smoking:   [employer]  Phone: 1- 877 - 792-5770  Web Address: www.[employer]  Weight Management   Why it is important to manage your weight:  Being overweight increases your risk of health conditions such as heart disease, high blood pressure, type 2 diabetes, and certain types of cancer. It can also increase your risk for osteoarthritis, sleep apnea, and  other respiratory problems. Aim for a slow, steady weight loss. Even a small amount of weight loss can lower your risk of health problems.  How to lose weight safely:  A safe and healthy way to lose weight is to eat fewer calories and get regular exercise. You can lose up about 1 pound a week by decreasing the number of calories you eat by 500 calories each day.   Healthy meal plan for weight management:  A healthy meal plan includes a variety of foods, contains fewer calories, and helps you stay healthy. A healthy meal plan includes the following:  Eat whole-grain foods more often.  A healthy meal plan should contain fiber. Fiber is the part of grains, fruits, and vegetables that is not broken down by your body. Whole-grain foods are healthy and provide extra fiber in your diet. Some examples of whole-grain foods are whole-wheat breads and pastas, oatmeal, brown rice, and bulgur.  Eat a variety of vegetables every day.  Include dark, leafy greens such as spinach, kale, linsey greens, and mustard greens. Eat yellow and orange vegetables such as carrots, sweet potatoes, and winter squash.   Eat a variety of fruits every day.  Choose fresh or canned fruit (canned in its own juice or light syrup) instead of juice. Fruit juice has very little or no fiber.  Eat low-fat dairy foods.  Drink fat-free (skim) milk or 1% milk. Eat fat-free yogurt and low-fat cottage cheese. Try low-fat cheeses such as mozzarella and other reduced-fat cheeses.  Choose meat and other protein foods that are low in fat.  Choose beans or other legumes such as split peas or lentils. Choose fish, skinless poultry (chicken or turkey), or lean cuts of red meat (beef or pork). Before you cook meat or poultry, cut off any visible fat.   Use less fat and oil.  Try baking foods instead of frying them. Add less fat, such as margarine, sour cream, regular salad dressing and mayonnaise to foods. Eat fewer high-fat foods. Some examples of high-fat foods  include french fries, doughnuts, ice cream, and cakes.  Eat fewer sweets.  Limit foods and drinks that are high in sugar. This includes candy, cookies, regular soda, and sweetened drinks.  Exercise:  Exercise at least 30 minutes per day on most days of the week. Some examples of exercise include walking, biking, dancing, and swimming. You can also fit in more physical activity by taking the stairs instead of the elevator or parking farther away from stores. Ask your healthcare provider about the best exercise plan for you.      © Copyright [employer] 2018 Information is for End User's use only and may not be sold, redistributed or otherwise used for commercial purposes. All illustrations and images included in CareNotes® are the copyrighted property of [employer] Inc[employer] or [employer]

## 2025-01-14 NOTE — ASSESSMENT & PLAN NOTE
Stable on Xanax, Patient uses the medication infrequently.  PDMP reviewed, medication refill called in.    Orders:    ALPRAZolam (XANAX) 0.25 mg tablet; Take 1 tablet (0.25 mg total) by mouth daily at bedtime as needed for anxiety

## 2025-01-14 NOTE — PROGRESS NOTES
Name: Kristy Brown      : 1960      MRN: [id number]  Encounter Provider: Linda Kennedy MD  Encounter Date: 2025   Encounter department: [facility]    Assessment & Plan  Mixed hyperlipidemia    Essential hypertension  Patient's blood pressure is at goal.  Continue lisinopril hydrochlorothiazide 10/12.5 milligram    Orders:    lisinopril-hydrochlorothiazide (PRINZIDE,ZESTORETIC) 10-12.5 MG per tablet; Take 1 tablet by mouth daily    Type 2 diabetes mellitus with obesity  (HCC)    Lab Results   Component Value Date    HGBA1C 5.5 2024     A1c improved significantly.  Patient is on Ozempic 1 mg, metformin 500 mg twice daily.  No hypoglycemia, patient advised to reduce the dose of metformin to 500 once daily.  Continue with low-carb diet.  Monitoring with metabolic labs at 6-month interval.    Orders:    metFORMIN (GLUCOPHAGE) 500 mg tablet; Take 1 tablet (500 mg total) by mouth daily at bedtime    semaglutide, 1 mg/dose, (Ozempic) 4 mg/3 mL injection pen; Inject 0.75 mL (1 mg total) under the skin once a week    Comprehensive metabolic panel; Future    Hemoglobin A1C; Future    Medicare annual wellness visit, subsequent  UTD         Anxiety   Stable on Xanax, Patient uses the medication infrequently.  PDMP reviewed, medication refill called in.    Orders:    ALPRAZolam (XANAX) 0.25 mg tablet; Take 1 tablet (0.25 mg total) by mouth daily at bedtime as needed for anxiety      Depression Screening and Follow-up Plan: Patient was screened for depression during today's encounter. They screened negative with a PHQ-9 score of 0.      Preventive health issues were discussed with patient, and age appropriate screening tests were ordered as noted in patient's After Visit Summary. Personalized health advice and appropriate referrals for health education or preventive services given if needed, as noted in patient's After Visit Summary.    History of Present Illness     HPI   Patient  is following up on her chronic medical problems.  Has history of type 2 diabetes, hypertension, dyslipidemia.  Patient is on Ozempic 0.5 mg, metformin 500 mg twice daily.  Patient started Ozempic in May.  Initially had some GI side effects however these have resolved now.  Her A1c improved significantly since she has been on Ozempic.  Patient reports significant improvement in her cravings.  Blood pressure is at goal.  Metabolic labs reviewed with patient, LDL is at goal, on atorvastatin 20 mg.  Patient denies any symptoms of chest pain or shortness of breath.  Follows up with gastroenterology for management of liver cirrhosis.  On beta-blocker per GI.  Patient has symptoms of intermittent anxiety especially worse with travel.  Patient takes Xanax 0.25 mg daily as needed only.     Patient Care Team:  Linda Kennedy MD as PCP - General (Family Medicine)  Seth Jensen PA-C as PCP - PCP-[facility]  Shaggy Cramer MD (Gastroenterology)  Jean Paul Chahal MD (Nephrology)    Review of Systems   Respiratory: Negative.     Cardiovascular: Negative.      Medical History Reviewed by provider this encounter:       Annual Wellness Visit Questionnaire   Kristy is here for her Initial Wellness visit. Last Medicare Wellness visit information reviewed, patient interviewed, no change since last AWV.     Health Risk Assessment:   Patient rates overall health as good. Patient feels that their physical health rating is same. Patient is very satisfied with their life. Eyesight was rated as same. Hearing was rated as same. Patient feels that their emotional and mental health rating is slightly better. Patients states they are never, rarely angry. Patient states they are never, rarely unusually tired/fatigued. Pain experienced in the last 7 days has been some. Patient's pain rating has been 4/10. Patient states that she has experienced no weight loss or gain in last 6 months.     Depression Screening:    PHQ-9 Score: 0      Fall Risk Screening:   In the past year, patient has experienced: no history of falling in past year      Urinary Incontinence Screening:   Patient has not leaked urine accidently in the last six months.     Home Safety:  Patient does not have trouble with stairs inside or outside of their home. Patient has working smoke alarms and has no working carbon monoxide detector. Home safety hazards include: none.     Nutrition:   Current diet is Diabetic, Low Cholesterol, Low Saturated Fat, Low Carb, No Added Salt and Limited junk food.     Medications:   Patient is currently taking over-the-counter supplements. OTC medications include: see medication list. Patient is able to manage medications.     Activities of Daily Living (ADLs)/Instrumental Activities of Daily Living (IADLs):   Walk and transfer into and out of bed and chair?: Yes  Dress and groom yourself?: Yes    Bathe or shower yourself?: Yes    Feed yourself? Yes  Do your laundry/housekeeping?: Yes  Manage your money, pay your bills and track your expenses?: Yes  Make your own meals?: Yes    Do your own shopping?: Yes    Previous Hospitalizations:   Any hospitalizations or ED visits within the last 12 months?: No      Advance Care Planning:   Living will: No    Durable POA for healthcare: No    Advanced directive: No      Cognitive Screening:   Provider or family/friend/caregiver concerned regarding cognition?: No    PREVENTIVE SCREENINGS      Cardiovascular Screening:    General: Screening Not Indicated and History Lipid Disorder      Diabetes Screening:     General: Screening Not Indicated and History Diabetes      Colorectal Cancer Screening:     General: Screening Current      Breast Cancer Screening:     General: Screening Current      Cervical Cancer Screening:    General: Screening Not Indicated      Osteoporosis Screening:    General: Screening Current      Lung Cancer Screening:     General: Screening Not Indicated      Hepatitis C  Screening:    General: Screening Current    Screening, Brief Intervention, and Referral to Treatment (SBIRT)    Screening  Typical number of drinks in a day: 0  Typical number of drinks in a week: 0  Interpretation: Low risk drinking behavior.    AUDIT-C Screenin) How often did you have a drink containing alcohol in the past year? never  2) How many drinks did you have on a typical day when you were drinking in the past year? 0  3) How often did you have 6 or more drinks on one occasion in the past year? never    AUDIT-C Score: 0  Interpretation: Score 0-2 (female): Negative screen for alcohol misuse    Single Item Drug Screening:  How often have you used an illegal drug (including marijuana) or a prescription medication for non-medical reasons in the past year? never    Single Item Drug Screen Score: 0  Interpretation: Negative screen for possible drug use disorder    Other Counseling Topics:   Calcium and vitamin D intake and regular weightbearing exercise.     Social Drivers of Health     Financial Resource Strain: Low Risk  (2024)    Overall Financial Resource Strain (CARDIA)     Difficulty of Paying Living Expenses: Not hard at all   Food Insecurity: No Food Insecurity (2025)    Hunger Vital Sign     Worried About Running Out of Food in the Last Year: Never true     Ran Out of Food in the Last Year: Never true   Transportation Needs: No Transportation Needs (2025)    PRAPARE - Transportation     Lack of Transportation (Medical): No     Lack of Transportation (Non-Medical): No   Housing Stability: Low Risk  (2025)    Housing Stability Vital Sign     Unable to Pay for Housing in the Last Year: No     Number of Times Moved in the Last Year: 0     Homeless in the Last Year: No   Utilities: Not At Risk (2025)    [facility] Utilities     Threatened with loss of utilities: No     No results found.    Objective   There were no vitals taken for this visit.    Physical Exam  Constitutional:        Appearance: Normal appearance.   Cardiovascular:      Heart sounds: Normal heart sounds.   Pulmonary:      Breath sounds: Normal breath sounds.   Psychiatric:         Mood and Affect: Mood normal.

## 2025-01-15 ENCOUNTER — TELEPHONE (OUTPATIENT)
Dept: FAMILY MEDICINE CLINIC | Facility: CLINIC | Age: 65
End: 2025-01-15

## 2025-01-15 NOTE — TELEPHONE ENCOUNTER
"PA has been started for patient by the pharmacy for Ozempic (1mg/dose) 4mg/3ml  pen-injectors.      Login to [employer]/login and click \"enter a key\".    Enter the patient's last name, date of birth and the key.    Key: [id number]    Complete the PA and click \"send to plan\" for approval.    "

## 2025-01-16 ENCOUNTER — PATIENT MESSAGE (OUTPATIENT)
Dept: FAMILY MEDICINE CLINIC | Facility: CLINIC | Age: 65
End: 2025-01-16

## 2025-01-17 ENCOUNTER — HOSPITAL ENCOUNTER (OUTPATIENT)
Dept: ULTRASOUND IMAGING | Facility: HOSPITAL | Age: 65
End: 2025-01-17
Payer: MEDICARE

## 2025-01-17 DIAGNOSIS — K74.69 OTHER CIRRHOSIS OF LIVER (HCC): ICD-10-CM

## 2025-01-17 PROCEDURE — 76705 ECHO EXAM OF ABDOMEN: CPT

## 2025-01-17 NOTE — TELEPHONE ENCOUNTER
ELENITA Louisic 1mg/dose SUBMITTED     to [employer]     via    []CMM-KEY:    [x]Surescripts-Case ID # [id number]  []Availity-Auth ID #  NDC #    []Faxed to plan   []Other website    []Phone call Case ID #      []PA sent as URGENT    All office notes, labs and other pertaining documents and studies sent. Clinical questions answered. Awaiting determination from insurance company.     Turnaround time for your insurance to make a decision on your Prior Authorization can take 7-21 business days.

## 2025-01-20 NOTE — TELEPHONE ENCOUNTER
PA Ozempic 1mg/dose APPROVED         Patient advised by - patient was notified by ins, see mychart encounter from 1/20          []MyChart Message  []Phone call   []LMOM  []L/M to call office as no active Communication consent on file  []Unable to leave detailed message as VM not approved on Communication consent       Pharmacy advised by    [x]Fax  []Phone call

## 2025-01-22 ENCOUNTER — RESULTS FOLLOW-UP (OUTPATIENT)
Dept: OTHER | Facility: HOSPITAL | Age: 65
End: 2025-01-22

## 2025-01-22 DIAGNOSIS — K74.69 OTHER CIRRHOSIS OF LIVER (HCC): Primary | ICD-10-CM

## 2025-02-13 PROBLEM — Z00.00 MEDICARE ANNUAL WELLNESS VISIT, SUBSEQUENT: Status: RESOLVED | Noted: 2025-01-14 | Resolved: 2025-02-13

## 2025-03-01 DIAGNOSIS — K74.69 OTHER CIRRHOSIS OF LIVER (HCC): ICD-10-CM

## 2025-03-03 RX ORDER — CARVEDILOL 12.5 MG/1
12.5 TABLET ORAL 2 TIMES DAILY WITH MEALS
Qty: 60 TABLET | Refills: 5 | OUTPATIENT
Start: 2025-03-03

## 2025-03-10 ENCOUNTER — TELEPHONE (OUTPATIENT)
Age: 65
End: 2025-03-10

## 2025-03-10 ENCOUNTER — NURSE TRIAGE (OUTPATIENT)
Age: 65
End: 2025-03-10

## 2025-03-10 NOTE — TELEPHONE ENCOUNTER
"FOLLOW UP: 8/11/25    REASON FOR CONVERSATION: Abdominal Pain    SYMPTOMS: lower abdominal pain, non-tender to touch, constipation, hx of diverticulitis, denies fever or blood in stool, pain increased with eating and movement     OTHER: pt. Advised to start Miralax BID and diet change: clear to low fiber to high fiber , last treated with antibiotics for diverticulitis a few years ago     DISPOSITION: Discuss with Provider and Call Back Within 24-48 hrs            Reason for Disposition   The patient has increased constipation without bleeding w LLQ pain.    Answer Assessment - Initial Assessment Questions  1. When did the pain start?   Last wednesday   2. Is the pain constant or intermittent and does the pain radiate? If so, where does it radiate?   Intermittent   3. Is your LLQ (lower left quadrant) pain tender to touch or worsen after pressing on the abdomen?   Denies   4. PAIN SEVERITY: If present, ask: \"How bad is the pain?\"  (e.g., Scale 1-10; mild, moderate, or severe)      - MILD (1-3): does not interfere with normal activities, abdomen soft and not tender to touch    - MODERATE (4-7): interferes with normal activities or awakens from sleep, tender to touch   - SEVERE (8-10): excruciating pain, doubled over, unable to do any normal activities   Cramping, 4/10   5. Does anything make the pain worse? Does anything make the pain better?   Movement or eating   6. Do you have a history of diverticulitis? If yes, what was your prior treatment for diverticulitis?   Yes, few years and was treated with Flagyl   7. Are you passing gas?   Yes   8. Have you noticed a change in your bowels?   Constipated   9. Have you had any black or bloody stools?   Denies   10. Do you have a fever?   Denies   11. Are you able to eat and drink without difficulty?   Nauseated but eating and drinking ok   12. Any chance of pregnancy?   Denies    Protocols used: GI-Left Lower Quadrant Pain (Diverticulitis)-ADULT-[place]    "

## 2025-03-10 NOTE — TELEPHONE ENCOUNTER
Patients GI provider:  Dr. Cramer    Number to return call: [phone]    Reason for call: Pt calling to report diverticulitis flare. Transferred to [facility] in triage for further assistance.    Scheduled procedure/appointment date if applicable: procedure 07/25/2025

## 2025-04-03 ENCOUNTER — APPOINTMENT (OUTPATIENT)
Dept: LAB | Facility: CLINIC | Age: 65
End: 2025-04-03
Payer: MEDICARE

## 2025-04-03 DIAGNOSIS — Z11.59 ENCOUNTER FOR SCREENING FOR OTHER VIRAL DISEASES: ICD-10-CM

## 2025-04-03 DIAGNOSIS — K74.69 OTHER CIRRHOSIS OF LIVER (HCC): ICD-10-CM

## 2025-04-03 LAB
AFP-TM SERPL-MCNC: 2.64 NG/ML (ref 0–9)
BASOPHILS # BLD AUTO: 0.05 THOUSANDS/ÂΜL (ref 0–0.1)
BASOPHILS NFR BLD AUTO: 1 % (ref 0–1)
EOSINOPHIL # BLD AUTO: 0.13 THOUSAND/ÂΜL (ref 0–0.61)
EOSINOPHIL NFR BLD AUTO: 2 % (ref 0–6)
ERYTHROCYTE [DISTWIDTH] IN BLOOD BY AUTOMATED COUNT: 13 % (ref 11.6–15.1)
HCT VFR BLD AUTO: 45.9 % (ref 34.8–46.1)
HGB BLD-MCNC: 14.4 G/DL (ref 11.5–15.4)
IMM GRANULOCYTES # BLD AUTO: 0.01 THOUSAND/UL (ref 0–0.2)
IMM GRANULOCYTES NFR BLD AUTO: 0 % (ref 0–2)
INR PPP: 0.99 (ref 0.85–1.19)
LYMPHOCYTES # BLD AUTO: 1.86 THOUSANDS/ÂΜL (ref 0.6–4.47)
LYMPHOCYTES NFR BLD AUTO: 31 % (ref 14–44)
MCH RBC QN AUTO: 30.8 PG (ref 26.8–34.3)
MCHC RBC AUTO-ENTMCNC: 31.4 G/DL (ref 31.4–37.4)
MCV RBC AUTO: 98 FL (ref 82–98)
MONOCYTES # BLD AUTO: 0.34 THOUSAND/ÂΜL (ref 0.17–1.22)
MONOCYTES NFR BLD AUTO: 6 % (ref 4–12)
NEUTROPHILS # BLD AUTO: 3.67 THOUSANDS/ÂΜL (ref 1.85–7.62)
NEUTS SEG NFR BLD AUTO: 60 % (ref 43–75)
NRBC BLD AUTO-RTO: 0 /100 WBCS
PLATELET # BLD AUTO: 204 THOUSANDS/UL (ref 149–390)
PMV BLD AUTO: 11 FL (ref 8.9–12.7)
PROTHROMBIN TIME: 13.4 SECONDS (ref 12.3–15)
RBC # BLD AUTO: 4.68 MILLION/UL (ref 3.81–5.12)
WBC # BLD AUTO: 6.06 THOUSAND/UL (ref 4.31–10.16)

## 2025-04-03 PROCEDURE — 85610 PROTHROMBIN TIME: CPT

## 2025-04-03 PROCEDURE — 86704 HEP B CORE ANTIBODY TOTAL: CPT

## 2025-04-03 PROCEDURE — 86706 HEP B SURFACE ANTIBODY: CPT

## 2025-04-03 PROCEDURE — 87340 HEPATITIS B SURFACE AG IA: CPT

## 2025-04-03 PROCEDURE — 82105 ALPHA-FETOPROTEIN SERUM: CPT

## 2025-04-03 PROCEDURE — 85025 COMPLETE CBC W/AUTO DIFF WBC: CPT

## 2025-04-03 PROCEDURE — 86708 HEPATITIS A ANTIBODY: CPT

## 2025-04-03 PROCEDURE — 36415 COLL VENOUS BLD VENIPUNCTURE: CPT

## 2025-04-04 LAB
HAV AB SER QL IA: NORMAL
HBV CORE AB SER QL: NORMAL
HBV SURFACE AB SER-ACNC: <3 MIU/ML
HBV SURFACE AG SER QL: NORMAL

## 2025-04-07 DIAGNOSIS — K74.60 HEPATIC CIRRHOSIS, UNSPECIFIED HEPATIC CIRRHOSIS TYPE, UNSPECIFIED WHETHER ASCITES PRESENT (HCC): ICD-10-CM

## 2025-04-07 RX ORDER — CARVEDILOL 6.25 MG/1
6.25 TABLET ORAL 2 TIMES DAILY WITH MEALS
Qty: 60 TABLET | Refills: 5 | Status: SHIPPED | OUTPATIENT
Start: 2025-04-07

## 2025-05-22 ENCOUNTER — TELEPHONE (OUTPATIENT)
Dept: GASTROENTEROLOGY | Facility: CLINIC | Age: 65
End: 2025-05-22

## 2025-05-22 NOTE — TELEPHONE ENCOUNTER
Bryanm for pt to call back. Due to a provider schedule change we will need to r/s her egd from 7/25/25 to a different day. Sb

## 2025-06-17 DIAGNOSIS — I10 ESSENTIAL HYPERTENSION: ICD-10-CM

## 2025-06-17 DIAGNOSIS — E78.2 MIXED HYPERLIPIDEMIA: ICD-10-CM

## 2025-06-17 NOTE — TELEPHONE ENCOUNTER
Reason for call:   [x] Refill   [] Prior Auth  [] Other:     Office:   [x] PCP/Provider -   [] Specialty/Provider -     Medication:   -lisinopril-hydrochlorothiazide (PRINZIDE,ZESTORETIC) 10-12.5 MG per tablet  - Take 1 tablet by mouth daily   - atorvastatin (LIPITOR) 20 mg tablet - Take 1 tablet (20 mg total) by mouth daily,       Pharmacy: [facility] [place] [place]     Local Pharmacy   Does the patient have enough for 3 days?   [x] Yes   [] No - Send as HP to POD    Mail Away Pharmacy   Does the patient have enough for 10 days?   [] Yes   [] No - Send as HP to POD

## 2025-06-18 RX ORDER — LISINOPRIL AND HYDROCHLOROTHIAZIDE 10; 12.5 MG/1; MG/1
1 TABLET ORAL DAILY
Qty: 90 TABLET | Refills: 0 | Status: SHIPPED | OUTPATIENT
Start: 2025-06-18

## 2025-06-18 RX ORDER — ATORVASTATIN CALCIUM 20 MG/1
20 TABLET, FILM COATED ORAL DAILY
Qty: 90 TABLET | Refills: 0 | Status: SHIPPED | OUTPATIENT
Start: 2025-06-18

## 2025-07-21 ENCOUNTER — OFFICE VISIT (OUTPATIENT)
Dept: FAMILY MEDICINE CLINIC | Facility: CLINIC | Age: 65
End: 2025-07-21
Payer: MEDICARE

## 2025-07-21 VITALS
WEIGHT: 221 LBS | HEIGHT: 63 IN | SYSTOLIC BLOOD PRESSURE: 122 MMHG | OXYGEN SATURATION: 98 % | HEART RATE: 68 BPM | DIASTOLIC BLOOD PRESSURE: 78 MMHG | BODY MASS INDEX: 39.16 KG/M2

## 2025-07-21 DIAGNOSIS — E11.69 TYPE 2 DIABETES MELLITUS WITH OBESITY  (HCC): Primary | ICD-10-CM

## 2025-07-21 DIAGNOSIS — E66.9 TYPE 2 DIABETES MELLITUS WITH OBESITY  (HCC): ICD-10-CM

## 2025-07-21 DIAGNOSIS — J01.00 ACUTE NON-RECURRENT MAXILLARY SINUSITIS: ICD-10-CM

## 2025-07-21 DIAGNOSIS — Z87.891 PERSONAL HISTORY OF NICOTINE DEPENDENCE: ICD-10-CM

## 2025-07-21 DIAGNOSIS — Z76.89 ENCOUNTER TO ESTABLISH CARE: Primary | ICD-10-CM

## 2025-07-21 DIAGNOSIS — I10 ESSENTIAL HYPERTENSION: ICD-10-CM

## 2025-07-21 DIAGNOSIS — E78.2 MIXED HYPERLIPIDEMIA: ICD-10-CM

## 2025-07-21 DIAGNOSIS — E66.9 TYPE 2 DIABETES MELLITUS WITH OBESITY  (HCC): Primary | ICD-10-CM

## 2025-07-21 DIAGNOSIS — E11.69 TYPE 2 DIABETES MELLITUS WITH OBESITY  (HCC): ICD-10-CM

## 2025-07-21 DIAGNOSIS — Z12.2 ENCOUNTER FOR SCREENING FOR LUNG CANCER: ICD-10-CM

## 2025-07-21 LAB
LEFT EYE DIABETIC RETINOPATHY: ABNORMAL
LEFT EYE IMAGE QUALITY: ABNORMAL
LEFT EYE MACULAR EDEMA: ABNORMAL
LEFT EYE OTHER RETINOPATHY: ABNORMAL
RIGHT EYE DIABETIC RETINOPATHY: ABNORMAL
RIGHT EYE IMAGE QUALITY: ABNORMAL
RIGHT EYE MACULAR EDEMA: ABNORMAL
RIGHT EYE OTHER RETINOPATHY: ABNORMAL
SEVERITY (EYE EXAM): ABNORMAL
SL AMB POCT HEMOGLOBIN AIC: 5.7 (ref ?–6.5)

## 2025-07-21 PROCEDURE — G2211 COMPLEX E/M VISIT ADD ON: HCPCS | Performed by: FAMILY MEDICINE

## 2025-07-21 PROCEDURE — 83036 HEMOGLOBIN GLYCOSYLATED A1C: CPT | Performed by: FAMILY MEDICINE

## 2025-07-21 PROCEDURE — 99214 OFFICE O/P EST MOD 30 MIN: CPT | Performed by: FAMILY MEDICINE

## 2025-07-21 RX ORDER — AZITHROMYCIN 250 MG/1
TABLET, FILM COATED ORAL
Qty: 6 TABLET | Refills: 0 | Status: SHIPPED | OUTPATIENT
Start: 2025-07-21 | End: 2025-07-26

## 2025-07-21 NOTE — ASSESSMENT & PLAN NOTE
- cont current regimen for now and will recheck after labs   - The 10-year ASCVD risk score (Ashley MONTE, et al., 2019) is: 23.4%    Values used to calculate the score:      Age: 64 years      Clincally relevant sex: Female      Is Non- : No      Diabetic: Yes      Tobacco smoker: Yes      Systolic Blood Pressure: 122 mmHg      Is BP treated: Yes      HDL Cholesterol: 35 mg/dL      Total Cholesterol: 131 mg/dL  Orders:    Lipid panel

## 2025-07-21 NOTE — ASSESSMENT & PLAN NOTE
- BP stable in office  - will defer PCV20 till next OV as pt currently with sinus infection (see below)   - caution with NSAIDs  - limit Na to 2g/day   - annual Ophtho   - smoking cessation advised   - cont current regimen for now and will recheck after labs   Orders:    Comprehensive metabolic panel    Albumin / creatinine urine ratio

## 2025-07-21 NOTE — ASSESSMENT & PLAN NOTE
- A1c = 5.7 in office  - other labs pending   - will defer PCV20 till next OV as pt currently with sinus infection (see below)   - DM foot exam as below   - IRIS eye exam done in office today and annual Ophtho advised   - diet/exercise   - cont current regimen for now and will recheck after labs   Lab Results   Component Value Date    HGBA1C 5.7 07/21/2025     Orders:    POCT hemoglobin A1c    IRIS Diabetic eye exam    CBC and differential    TSH, 3rd generation with Free T4 reflex

## 2025-07-21 NOTE — PROGRESS NOTES
Name: rKisty Brown      : 1960      MRN: [id number]  Encounter Provider: Yasmine Mayorga DO  Encounter Date: 2025   Encounter department: [facility]  :  Assessment & Plan  Encounter to establish care  - switching PCP in the office   - reviewed PMHx, PSHx, meds, allergies, Fhx, Soc Hx   - UTD with Tdap   - will defer PCV20 till next OV as pt currently with sinus infection (see below)   - s/p hysterectomy at 39yo   - UTD with Mammo   - UTD with Colon Ca screening - last Cscope in 2017 with 10yr recall   - (+) current smoker - has never had lung ca screening - d/w pt and pt agreeable to CT lung   - diet/exercise        Type 2 diabetes mellitus with obesity  (HCC)  - A1c = 5.7 in office  - other labs pending   - will defer PCV20 till next OV as pt currently with sinus infection (see below)   - DM foot exam as below   - IRIS eye exam done in office today and annual Ophtho advised   - diet/exercise   - cont current regimen for now and will recheck after labs   Lab Results   Component Value Date    HGBA1C 5.7 2025     Orders:    POCT hemoglobin A1c    IRIS Diabetic eye exam    CBC and differential    TSH, 3rd generation with Free T4 reflex    Mixed hyperlipidemia  - cont current regimen for now and will recheck after labs   - The 10-year ASCVD risk score (Ashley MONTE, et al., 2019) is: 23.4%    Values used to calculate the score:      Age: 64 years      Clincally relevant sex: Female      Is Non- : No      Diabetic: Yes      Tobacco smoker: Yes      Systolic Blood Pressure: 122 mmHg      Is BP treated: Yes      HDL Cholesterol: 35 mg/dL      Total Cholesterol: 131 mg/dL  Orders:    Lipid panel    Essential hypertension  - BP stable in office  - will defer PCV20 till next OV as pt currently with sinus infection (see below)   - caution with NSAIDs  - limit Na to 2g/day   - annual Ophtho   - smoking cessation advised   - cont current regimen for now and will  recheck after labs   Orders:    Comprehensive metabolic panel    Albumin / creatinine urine ratio    Acute non-recurrent maxillary sinusitis    Orders:    azithromycin (Zithromax) 250 mg tablet; Take 2 tablets (500 mg total) by mouth daily for 1 day, THEN 1 tablet (250 mg total) daily for 4 days.    Encounter for screening for lung cancer  I discussed with her that she is a candidate for lung cancer CT screening.     The following Shared Decision-Making points were covered:  Benefits of screening were discussed, including the rates of reduction in death from lung cancer and other causes.  Harms of screening were reviewed, including false positive tests, radiation exposure levels, risks of invasive procedures, risks of complications of screening, and risk of overdiagnosis.  I counseled on the importance of adherence to annual lung cancer LDCT screening, impact of co-morbidities, and ability or willingness to undergo diagnosis and treatment.  I counseled on the importance of maintaining abstinence as a former smoker or was counseled on the importance of smoking cessation if a current smoker    Review of Eligibility Criteria: She meets all of the criteria for Lung Cancer Screening.   She is 64 y.o.   She has 20 pack year tobacco history and is a current smoker or has quit within the past 15 years  She presents no signs or symptoms of lung cancer    After discussion, the patient decided to elect lung cancer screening.    Orders:    CT Lung Screening Program; Future    Personal history of nicotine dependence    Orders:    CT Lung Screening Program; Future           History of Present Illness   Kristy Brown is a 64 y.o. female who presents to the office to establish care and for f/u   - prior PCP: switching PCPs in the office   - Specialists: ENT, GI   - PMHx: HTN, dependent rubor, esophageal varices w/o bleeding, liver cirrhosis, diverticulosis, GERD, DM2, tobacco abuse, DEREJE, HL, microalbuminuria, BMI 39   -  "allergies: PCN (reaction unknown)   - Meds: see med rec   - PSHx: see surg rec   - FHx: M (HTN), Sister (lung ca)   - Immunizations: UTD with Tdap, due for Pcv20 and will get in the office today   - GYN Hx: s/p hysterectomy at 41yo   - Hm: UTD with Mammo (1/2025), UTD with Cscope (2017) - 10yr recall   - diet/exercise: active, balanced diet   - social: (+) current smoker - 4cigs/day (1/3 PPD/46yrs), denies EtOH   - for the past week with sinus pressure, congestion, PND - allergy medication not working   - ROS: today in the office pt denies F/C/N/V/HA/visual changes/CP/palpitations/SOB/wheezing/abd pain/D/LE edema      Review of Systems as per HPI     Objective   /78   Pulse 68   Ht 5' 3\" (1.6 m)   Wt 100 kg (221 lb)   SpO2 98%   BMI 39.15 kg/m²      Physical Exam  Vitals reviewed.   Constitutional:       General: She is not in acute distress.     Appearance: Normal appearance. She is not ill-appearing, toxic-appearing or diaphoretic.   HENT:      Head: Normocephalic and atraumatic.      Comments: Blue ear tube in R-ear      Right Ear: External ear normal. No drainage, swelling or tenderness. There is no impacted cerumen.      Left Ear: Ear canal and external ear normal. No drainage, swelling or tenderness. A middle ear effusion is present. There is no impacted cerumen.      Nose: Congestion present.      Right Sinus: Maxillary sinus tenderness present. No frontal sinus tenderness.      Left Sinus: Maxillary sinus tenderness present. No frontal sinus tenderness.      Mouth/Throat:      Mouth: Mucous membranes are moist.      Pharynx: Oropharynx is clear. Posterior oropharyngeal erythema present. No oropharyngeal exudate.     Eyes:      General: No scleral icterus.        Right eye: No discharge.         Left eye: No discharge.      Extraocular Movements: Extraocular movements intact.      Conjunctiva/sclera: Conjunctivae normal.       Cardiovascular:      Rate and Rhythm: Normal rate and regular rhythm.    "   Pulses: no weak pulses.           Dorsalis pedis pulses are 2+ on the right side and 2+ on the left side.      Heart sounds: Normal heart sounds.   Pulmonary:      Effort: Pulmonary effort is normal. No respiratory distress.      Breath sounds: Normal breath sounds. No stridor. No wheezing, rhonchi or rales.   Abdominal:      Palpations: Abdomen is soft.     Musculoskeletal:         General: Normal range of motion.      Right lower leg: No edema.      Left lower leg: No edema.   Feet:      Right foot:      Skin integrity: No ulcer, skin breakdown, erythema, warmth, callus or dry skin.      Left foot:      Skin integrity: No ulcer, skin breakdown, erythema, warmth, callus or dry skin.   Lymphadenopathy:      Cervical: No cervical adenopathy.     Skin:     General: Skin is warm.     Neurological:      General: No focal deficit present.      Mental Status: She is alert and oriented to person, place, and time.     Psychiatric:         Mood and Affect: Mood normal.         Behavior: Behavior normal.       Patient's shoes and socks removed.    Right Foot/Ankle   Right Foot Inspection  Skin Exam: skin normal and skin intact. No dry skin, no warmth, no callus, no erythema, no maceration, no abnormal color, no pre-ulcer, no ulcer and no callus.     Toe Exam: ROM and strength within normal limits.     Sensory   Monofilament testing: intact    Vascular  The right DP pulse is 2+.     Left Foot/Ankle  Left Foot Inspection  Skin Exam: skin normal and skin intact. No dry skin, no warmth, no erythema, no maceration, normal color, no pre-ulcer, no ulcer and no callus.     Toe Exam: ROM and strength within normal limits.     Sensory   Monofilament testing: intact    Vascular  The left DP pulse is 2+.     Assign Risk Category  No deformity present  No loss of protective sensation  No weak pulses  Risk: 0

## 2025-07-22 ENCOUNTER — APPOINTMENT (OUTPATIENT)
Dept: LAB | Facility: CLINIC | Age: 65
End: 2025-07-22
Payer: MEDICARE

## 2025-07-22 DIAGNOSIS — E78.2 MIXED HYPERLIPIDEMIA: ICD-10-CM

## 2025-07-22 DIAGNOSIS — E11.69 TYPE 2 DIABETES MELLITUS WITH OBESITY  (HCC): ICD-10-CM

## 2025-07-22 DIAGNOSIS — E66.9 TYPE 2 DIABETES MELLITUS WITH OBESITY  (HCC): ICD-10-CM

## 2025-07-22 DIAGNOSIS — I10 ESSENTIAL HYPERTENSION: ICD-10-CM

## 2025-07-22 LAB
ALBUMIN SERPL BCG-MCNC: 4.2 G/DL (ref 3.5–5)
ALP SERPL-CCNC: 89 U/L (ref 34–104)
ALT SERPL W P-5'-P-CCNC: 19 U/L (ref 7–52)
ANION GAP SERPL CALCULATED.3IONS-SCNC: 9 MMOL/L (ref 4–13)
AST SERPL W P-5'-P-CCNC: 21 U/L (ref 13–39)
BACTERIA UR QL AUTO: ABNORMAL /HPF
BASOPHILS # BLD AUTO: 0.04 THOUSANDS/ÂΜL (ref 0–0.1)
BASOPHILS NFR BLD AUTO: 1 % (ref 0–1)
BILIRUB SERPL-MCNC: 0.51 MG/DL (ref 0.2–1)
BILIRUB UR QL STRIP: NEGATIVE
BUN SERPL-MCNC: 10 MG/DL (ref 5–25)
CALCIUM SERPL-MCNC: 9.9 MG/DL (ref 8.4–10.2)
CHLORIDE SERPL-SCNC: 102 MMOL/L (ref 96–108)
CHOLEST SERPL-MCNC: 122 MG/DL (ref ?–200)
CLARITY UR: CLEAR
CO2 SERPL-SCNC: 30 MMOL/L (ref 21–32)
COLOR UR: ABNORMAL
CREAT SERPL-MCNC: 0.53 MG/DL (ref 0.6–1.3)
CREAT UR-MCNC: 76.7 MG/DL
CREAT UR-MCNC: 76.7 MG/DL
EOSINOPHIL # BLD AUTO: 0.11 THOUSAND/ÂΜL (ref 0–0.61)
EOSINOPHIL NFR BLD AUTO: 2 % (ref 0–6)
ERYTHROCYTE [DISTWIDTH] IN BLOOD BY AUTOMATED COUNT: 13.2 % (ref 11.6–15.1)
EST. AVERAGE GLUCOSE BLD GHB EST-MCNC: 137 MG/DL
GFR SERPL CREATININE-BSD FRML MDRD: 99 ML/MIN/1.73SQ M
GLUCOSE P FAST SERPL-MCNC: 117 MG/DL (ref 65–99)
GLUCOSE UR STRIP-MCNC: NEGATIVE MG/DL
HBA1C MFR BLD: 6.4 %
HCT VFR BLD AUTO: 45.4 % (ref 34.8–46.1)
HDLC SERPL-MCNC: 42 MG/DL
HGB BLD-MCNC: 14.9 G/DL (ref 11.5–15.4)
HGB UR QL STRIP.AUTO: NEGATIVE
IMM GRANULOCYTES # BLD AUTO: 0.01 THOUSAND/UL (ref 0–0.2)
IMM GRANULOCYTES NFR BLD AUTO: 0 % (ref 0–2)
KETONES UR STRIP-MCNC: NEGATIVE MG/DL
LDLC SERPL CALC-MCNC: 58 MG/DL (ref 0–100)
LEUKOCYTE ESTERASE UR QL STRIP: NEGATIVE
LYMPHOCYTES # BLD AUTO: 1.71 THOUSANDS/ÂΜL (ref 0.6–4.47)
LYMPHOCYTES NFR BLD AUTO: 29 % (ref 14–44)
MCH RBC QN AUTO: 31.4 PG (ref 26.8–34.3)
MCHC RBC AUTO-ENTMCNC: 32.8 G/DL (ref 31.4–37.4)
MCV RBC AUTO: 96 FL (ref 82–98)
MICROALBUMIN UR-MCNC: <7 MG/L
MONOCYTES # BLD AUTO: 0.32 THOUSAND/ÂΜL (ref 0.17–1.22)
MONOCYTES NFR BLD AUTO: 5 % (ref 4–12)
MUCOUS THREADS UR QL AUTO: ABNORMAL
NEUTROPHILS # BLD AUTO: 3.7 THOUSANDS/ÂΜL (ref 1.85–7.62)
NEUTS SEG NFR BLD AUTO: 63 % (ref 43–75)
NITRITE UR QL STRIP: NEGATIVE
NON-SQ EPI CELLS URNS QL MICRO: ABNORMAL /HPF
NONHDLC SERPL-MCNC: 80 MG/DL
NRBC BLD AUTO-RTO: 0 /100 WBCS
PH UR STRIP.AUTO: 6 [PH]
PHOSPHATE SERPL-MCNC: 3.5 MG/DL (ref 2.3–4.1)
PLATELET # BLD AUTO: 174 THOUSANDS/UL (ref 149–390)
PMV BLD AUTO: 11 FL (ref 8.9–12.7)
POTASSIUM SERPL-SCNC: 4.2 MMOL/L (ref 3.5–5.3)
PROT SERPL-MCNC: 6.7 G/DL (ref 6.4–8.4)
PROT UR STRIP-MCNC: NEGATIVE MG/DL
PROT UR-MCNC: <4 MG/DL
RBC # BLD AUTO: 4.75 MILLION/UL (ref 3.81–5.12)
RBC #/AREA URNS AUTO: ABNORMAL /HPF
SODIUM SERPL-SCNC: 141 MMOL/L (ref 135–147)
SP GR UR STRIP.AUTO: 1.01 (ref 1–1.03)
TRIGL SERPL-MCNC: 110 MG/DL (ref ?–150)
TSH SERPL DL<=0.05 MIU/L-ACNC: 3.16 UIU/ML (ref 0.45–4.5)
UROBILINOGEN UR STRIP-ACNC: <2 MG/DL
WBC # BLD AUTO: 5.89 THOUSAND/UL (ref 4.31–10.16)
WBC #/AREA URNS AUTO: ABNORMAL /HPF

## 2025-07-22 PROCEDURE — 85025 COMPLETE CBC W/AUTO DIFF WBC: CPT | Performed by: FAMILY MEDICINE

## 2025-07-22 PROCEDURE — 84156 ASSAY OF PROTEIN URINE: CPT

## 2025-07-22 PROCEDURE — 80053 COMPREHEN METABOLIC PANEL: CPT | Performed by: FAMILY MEDICINE

## 2025-07-22 PROCEDURE — 82570 ASSAY OF URINE CREATININE: CPT

## 2025-07-22 PROCEDURE — 82570 ASSAY OF URINE CREATININE: CPT | Performed by: FAMILY MEDICINE

## 2025-07-22 PROCEDURE — 82043 UR ALBUMIN QUANTITATIVE: CPT | Performed by: FAMILY MEDICINE

## 2025-07-22 PROCEDURE — 36415 COLL VENOUS BLD VENIPUNCTURE: CPT | Performed by: FAMILY MEDICINE

## 2025-07-22 PROCEDURE — 84100 ASSAY OF PHOSPHORUS: CPT

## 2025-07-22 PROCEDURE — 83036 HEMOGLOBIN GLYCOSYLATED A1C: CPT

## 2025-07-22 PROCEDURE — 81001 URINALYSIS AUTO W/SCOPE: CPT

## 2025-07-22 PROCEDURE — 84443 ASSAY THYROID STIM HORMONE: CPT | Performed by: FAMILY MEDICINE

## 2025-07-22 PROCEDURE — 80061 LIPID PANEL: CPT | Performed by: FAMILY MEDICINE

## 2025-07-24 LAB
LEFT EYE DIABETIC RETINOPATHY: NORMAL
RIGHT EYE DIABETIC RETINOPATHY: NORMAL

## 2025-07-29 ENCOUNTER — HOSPITAL ENCOUNTER (OUTPATIENT)
Dept: GASTROENTEROLOGY | Facility: HOSPITAL | Age: 65
Setting detail: OUTPATIENT SURGERY
Discharge: HOME | End: 2025-07-29
Attending: INTERNAL MEDICINE
Payer: MEDICARE

## 2025-07-29 ENCOUNTER — ANESTHESIA EVENT (OUTPATIENT)
Dept: GASTROENTEROLOGY | Facility: HOSPITAL | Age: 65
End: 2025-07-29
Payer: MEDICARE

## 2025-07-29 ENCOUNTER — ANESTHESIA (OUTPATIENT)
Dept: GASTROENTEROLOGY | Facility: HOSPITAL | Age: 65
End: 2025-07-29
Payer: MEDICARE

## 2025-07-29 VITALS
WEIGHT: 221 LBS | BODY MASS INDEX: 39.15 KG/M2 | OXYGEN SATURATION: 97 % | SYSTOLIC BLOOD PRESSURE: 105 MMHG | RESPIRATION RATE: 16 BRPM | DIASTOLIC BLOOD PRESSURE: 55 MMHG | HEART RATE: 73 BPM | TEMPERATURE: 97.8 F

## 2025-07-29 DIAGNOSIS — I10 ESSENTIAL HYPERTENSION: ICD-10-CM

## 2025-07-29 DIAGNOSIS — E78.2 MIXED HYPERLIPIDEMIA: ICD-10-CM

## 2025-07-29 DIAGNOSIS — K74.69 OTHER CIRRHOSIS OF LIVER (HCC): ICD-10-CM

## 2025-07-29 LAB — GLUCOSE SERPL-MCNC: 134 MG/DL (ref 65–140)

## 2025-07-29 PROCEDURE — 88305 TISSUE EXAM BY PATHOLOGIST: CPT | Performed by: PATHOLOGY

## 2025-07-29 PROCEDURE — 82948 REAGENT STRIP/BLOOD GLUCOSE: CPT

## 2025-07-29 RX ORDER — PROPOFOL 10 MG/ML
INJECTION, EMULSION INTRAVENOUS AS NEEDED
Status: DISCONTINUED | OUTPATIENT
Start: 2025-07-29 | End: 2025-07-29

## 2025-07-29 RX ORDER — SODIUM CHLORIDE, SODIUM LACTATE, POTASSIUM CHLORIDE, CALCIUM CHLORIDE 600; 310; 30; 20 MG/100ML; MG/100ML; MG/100ML; MG/100ML
INJECTION, SOLUTION INTRAVENOUS CONTINUOUS PRN
Status: DISCONTINUED | OUTPATIENT
Start: 2025-07-29 | End: 2025-07-29

## 2025-07-29 RX ORDER — LIDOCAINE HYDROCHLORIDE 10 MG/ML
INJECTION, SOLUTION EPIDURAL; INFILTRATION; INTRACAUDAL; PERINEURAL AS NEEDED
Status: DISCONTINUED | OUTPATIENT
Start: 2025-07-29 | End: 2025-07-29

## 2025-07-29 RX ORDER — LISINOPRIL AND HYDROCHLOROTHIAZIDE 10; 12.5 MG/1; MG/1
1 TABLET ORAL DAILY
Qty: 90 TABLET | Refills: 0 | Status: SHIPPED | OUTPATIENT
Start: 2025-07-29

## 2025-07-29 RX ORDER — ATORVASTATIN CALCIUM 20 MG/1
20 TABLET, FILM COATED ORAL DAILY
Qty: 90 TABLET | Refills: 0 | Status: SHIPPED | OUTPATIENT
Start: 2025-07-29

## 2025-07-29 RX ADMIN — SODIUM CHLORIDE, SODIUM LACTATE, POTASSIUM CHLORIDE, AND CALCIUM CHLORIDE: .6; .31; .03; .02 INJECTION, SOLUTION INTRAVENOUS at 07:45

## 2025-07-29 RX ADMIN — LIDOCAINE HYDROCHLORIDE 50 MG: 10 INJECTION, SOLUTION EPIDURAL; INFILTRATION; INTRACAUDAL; PERINEURAL at 08:30

## 2025-07-29 RX ADMIN — PROPOFOL 150 MCG/KG/MIN: 10 INJECTION, EMULSION INTRAVENOUS at 08:31

## 2025-07-29 RX ADMIN — PROPOFOL 100 MG: 10 INJECTION, EMULSION INTRAVENOUS at 08:30

## 2025-07-29 RX ADMIN — SODIUM CHLORIDE, SODIUM LACTATE, POTASSIUM CHLORIDE, AND CALCIUM CHLORIDE: .6; .31; .03; .02 INJECTION, SOLUTION INTRAVENOUS at 08:35

## 2025-08-04 PROCEDURE — 88305 TISSUE EXAM BY PATHOLOGIST: CPT | Performed by: PATHOLOGY

## 2025-08-11 ENCOUNTER — OFFICE VISIT (OUTPATIENT)
Dept: GASTROENTEROLOGY | Facility: CLINIC | Age: 65
End: 2025-08-11
Payer: MEDICARE

## 2025-08-18 ENCOUNTER — TELEMEDICINE (OUTPATIENT)
Dept: PULMONOLOGY | Facility: CLINIC | Age: 65
End: 2025-08-18

## 2025-08-18 DIAGNOSIS — T65.291A TOXIC EFFECT OF TOBACCO, ACCIDENTAL OR UNINTENTIONAL, INITIAL ENCOUNTER: ICD-10-CM

## 2025-08-18 DIAGNOSIS — F17.210 CIGARETTE NICOTINE DEPENDENCE WITHOUT COMPLICATION: Primary | ICD-10-CM

## 2025-08-18 PROCEDURE — SMOKECESS PR SMOKING CESSATION

## 2025-08-19 DIAGNOSIS — T65.291A TOXIC EFFECT OF TOBACCO, ACCIDENTAL OR UNINTENTIONAL, INITIAL ENCOUNTER: Primary | ICD-10-CM

## 2025-08-19 DIAGNOSIS — F17.210 CIGARETTE NICOTINE DEPENDENCE WITHOUT COMPLICATION: ICD-10-CM

## 2025-08-19 RX ORDER — BUPROPION HYDROCHLORIDE 150 MG/1
TABLET, EXTENDED RELEASE ORAL
Qty: 57 TABLET | Refills: 0 | Status: SHIPPED | OUTPATIENT
Start: 2025-08-19 | End: 2025-09-18